# Patient Record
Sex: FEMALE | Race: WHITE | NOT HISPANIC OR LATINO | Employment: UNEMPLOYED | URBAN - METROPOLITAN AREA
[De-identification: names, ages, dates, MRNs, and addresses within clinical notes are randomized per-mention and may not be internally consistent; named-entity substitution may affect disease eponyms.]

---

## 2017-01-01 ENCOUNTER — TELEPHONE (OUTPATIENT)
Dept: TRANSPLANT | Facility: CLINIC | Age: 57
End: 2017-01-01

## 2017-05-08 ENCOUNTER — HOSPITAL ENCOUNTER (OUTPATIENT)
Dept: RADIOLOGY | Facility: HOSPITAL | Age: 57
Discharge: HOME | End: 2017-05-08
Attending: INTERNAL MEDICINE
Payer: OTHER GOVERNMENT

## 2017-05-08 DIAGNOSIS — J20.9 ACUTE BRONCHITIS: ICD-10-CM

## 2017-05-08 DIAGNOSIS — J20.9 ACUTE BRONCHITIS: Primary | ICD-10-CM

## 2017-05-08 PROCEDURE — 71020 XR CHEST PA AND LATERAL: CPT | Mod: TC

## 2017-05-08 PROCEDURE — 71020 XR CHEST PA AND LATERAL: CPT | Mod: 26,,, | Performed by: RADIOLOGY

## 2017-05-11 ENCOUNTER — HOSPITAL ENCOUNTER (OUTPATIENT)
Dept: RADIOLOGY | Facility: HOSPITAL | Age: 57
Discharge: HOME | End: 2017-05-11
Attending: INTERNAL MEDICINE
Payer: OTHER GOVERNMENT

## 2017-05-11 DIAGNOSIS — J44.1 OBSTRUCTIVE CHRONIC BRONCHITIS WITH EXACERBATION: ICD-10-CM

## 2017-05-11 PROCEDURE — 71020 XR CHEST PA AND LATERAL: CPT | Mod: 26,,, | Performed by: RADIOLOGY

## 2017-05-11 PROCEDURE — 71020 XR CHEST PA AND LATERAL: CPT | Mod: TC

## 2017-08-14 NOTE — TELEPHONE ENCOUNTER
Spoke w/patient, who reports her issues are more pulmonary oriented, and she also has hypertension, but not specifically PH. Pt provided with number for Pulmonary services to schedule appt.

## 2017-08-14 NOTE — TELEPHONE ENCOUNTER
----- Message from America Priest MA sent at 8/14/2017 11:03 AM CDT -----  Contact: pt   She will be a new patient . Thanks   ----- Message -----  From: Radha Connor  Sent: 8/14/2017  10:36 AM  To: [facility] Schedulers    Pt called because she wanted to schedule an appt w/  Dr. Roca.  She states she is having pulmonary and cardiac issues and wants to see Dr. Roca.  She can be reached @ [phone].  Thanks!!

## 2017-08-23 PROBLEM — B44.9 ASPERGILLOSIS: Status: ACTIVE | Noted: 2017-01-01

## 2017-08-23 PROBLEM — B44.81 ABPA (ALLERGIC BRONCHOPULMONARY ASPERGILLOSIS): Status: ACTIVE | Noted: 2017-01-01

## 2017-08-23 PROBLEM — K21.9 GASTROESOPHAGEAL REFLUX DISEASE WITHOUT ESOPHAGITIS: Status: ACTIVE | Noted: 2017-01-01

## 2017-08-25 PROBLEM — B44.9 ASPERGILLOSIS: Status: ACTIVE | Noted: 2017-01-01

## 2017-09-12 PROBLEM — J18.9 PNEUMONIA OF LEFT LOWER LOBE DUE TO INFECTIOUS ORGANISM: Status: ACTIVE | Noted: 2017-01-01

## 2017-09-12 PROBLEM — K21.9 GERD (GASTROESOPHAGEAL REFLUX DISEASE): Status: ACTIVE | Noted: 2017-01-01

## 2017-09-13 PROBLEM — R06.09 DYSPNEA ON EXERTION: Status: ACTIVE | Noted: 2017-01-01

## 2017-09-13 PROBLEM — R50.9 FEBRILE ILLNESS: Status: ACTIVE | Noted: 2017-01-01

## 2017-09-13 PROBLEM — R93.89 ABNORMAL X-RAY: Status: ACTIVE | Noted: 2017-01-01

## 2017-09-13 PROBLEM — E66.9 OBESITY: Status: ACTIVE | Noted: 2017-01-01

## 2017-10-04 PROBLEM — J18.9 PNEUMONIA OF LEFT LOWER LOBE DUE TO INFECTIOUS ORGANISM: Status: RESOLVED | Noted: 2017-01-01 | Resolved: 2017-01-01

## 2017-10-17 NOTE — TELEPHONE ENCOUNTER
On 10/5/17 I received a message that pt was calling to arrange an appt with Dr. Roca. For eval of her ABPD, which she says her current cardiologist  Says can cause her LV failure. In reviewing epic records, I found no record of and echo. Per Dr Roca we need record of an echo indicating pt has PH or HF.   10/6/17 Per my request my coworker Jenelle Weber RN informed pt of this. Per Scar conversation with pt, pt is to send us copy of her echo. As of today, we have not received echo.

## 2017-12-19 PROBLEM — E78.2 MIXED HYPERLIPIDEMIA: Status: ACTIVE | Noted: 2017-01-01

## 2017-12-19 PROBLEM — J47.9 BRONCHIECTASIS WITHOUT COMPLICATION: Status: ACTIVE | Noted: 2017-01-01

## 2017-12-19 PROBLEM — J32.9 CHRONIC SINUSITIS: Status: ACTIVE | Noted: 2017-01-01

## 2017-12-19 PROBLEM — I10 ESSENTIAL HYPERTENSION: Status: ACTIVE | Noted: 2017-01-01

## 2017-12-19 PROBLEM — E55.9 VITAMIN D DEFICIENCY: Status: ACTIVE | Noted: 2017-01-01

## 2017-12-19 PROBLEM — A60.04: Status: ACTIVE | Noted: 2017-01-01

## 2017-12-19 PROBLEM — R73.02 IMPAIRED GLUCOSE TOLERANCE: Status: ACTIVE | Noted: 2017-01-01

## 2018-01-01 ENCOUNTER — ANESTHESIA EVENT (OUTPATIENT)
Dept: INTENSIVE CARE | Facility: HOSPITAL | Age: 58
DRG: 166 | End: 2018-01-01
Payer: OTHER GOVERNMENT

## 2018-01-01 ENCOUNTER — HOSPITAL ENCOUNTER (INPATIENT)
Facility: HOSPITAL | Age: 58
LOS: 17 days | DRG: 166 | End: 2018-06-11
Attending: INTERNAL MEDICINE | Admitting: INTERNAL MEDICINE
Payer: OTHER GOVERNMENT

## 2018-01-01 VITALS
TEMPERATURE: 98 F | OXYGEN SATURATION: 77 % | WEIGHT: 179.44 LBS | RESPIRATION RATE: 28 BRPM | DIASTOLIC BLOOD PRESSURE: 76 MMHG | BODY MASS INDEX: 33.02 KG/M2 | HEIGHT: 62 IN | SYSTOLIC BLOOD PRESSURE: 107 MMHG

## 2018-01-01 DIAGNOSIS — B44.81 ABPA (ALLERGIC BRONCHOPULMONARY ASPERGILLOSIS): ICD-10-CM

## 2018-01-01 DIAGNOSIS — J98.11 ATELECTASIS: ICD-10-CM

## 2018-01-01 DIAGNOSIS — R00.0 TACHYCARDIA: ICD-10-CM

## 2018-01-01 DIAGNOSIS — I10 ESSENTIAL HYPERTENSION: ICD-10-CM

## 2018-01-01 DIAGNOSIS — J96.90 RESPIRATORY FAILURE: ICD-10-CM

## 2018-01-01 DIAGNOSIS — T17.500A MUCUS PLUGGING OF BRONCHI: ICD-10-CM

## 2018-01-01 DIAGNOSIS — J96.01 ACUTE HYPOXEMIC RESPIRATORY FAILURE: Primary | ICD-10-CM

## 2018-01-01 DIAGNOSIS — J81.1 PULMONARY EDEMA: ICD-10-CM

## 2018-01-01 LAB
1,3 BETA GLUCAN SPEC-MCNC: <31 PG/ML
ABO + RH BLD: NORMAL
ALBUMIN SERPL BCP-MCNC: 1.2 G/DL
ALBUMIN SERPL BCP-MCNC: 1.5 G/DL
ALBUMIN SERPL BCP-MCNC: 1.7 G/DL
ALBUMIN SERPL BCP-MCNC: 1.8 G/DL
ALBUMIN SERPL BCP-MCNC: 1.9 G/DL
ALBUMIN SERPL BCP-MCNC: 2 G/DL
ALBUMIN SERPL BCP-MCNC: 2.1 G/DL
ALBUMIN SERPL BCP-MCNC: 2.2 G/DL
ALBUMIN SERPL BCP-MCNC: 2.3 G/DL
ALLENS TEST: ABNORMAL
ALP SERPL-CCNC: 161 U/L
ALP SERPL-CCNC: 199 U/L
ALP SERPL-CCNC: 201 U/L
ALP SERPL-CCNC: 217 U/L
ALP SERPL-CCNC: 220 U/L
ALP SERPL-CCNC: 236 U/L
ALP SERPL-CCNC: 241 U/L
ALP SERPL-CCNC: 256 U/L
ALP SERPL-CCNC: 262 U/L
ALP SERPL-CCNC: 266 U/L
ALP SERPL-CCNC: 269 U/L
ALP SERPL-CCNC: 272 U/L
ALP SERPL-CCNC: 295 U/L
ALP SERPL-CCNC: 304 U/L
ALP SERPL-CCNC: 393 U/L
ALP SERPL-CCNC: 432 U/L
ALP SERPL-CCNC: 500 U/L
ALP SERPL-CCNC: 515 U/L
ALT SERPL W/O P-5'-P-CCNC: 103 U/L
ALT SERPL W/O P-5'-P-CCNC: 107 U/L
ALT SERPL W/O P-5'-P-CCNC: 110 U/L
ALT SERPL W/O P-5'-P-CCNC: 123 U/L
ALT SERPL W/O P-5'-P-CCNC: 126 U/L
ALT SERPL W/O P-5'-P-CCNC: 142 U/L
ALT SERPL W/O P-5'-P-CCNC: 167 U/L
ALT SERPL W/O P-5'-P-CCNC: 173 U/L
ALT SERPL W/O P-5'-P-CCNC: 179 U/L
ALT SERPL W/O P-5'-P-CCNC: 187 U/L
ALT SERPL W/O P-5'-P-CCNC: 205 U/L
ALT SERPL W/O P-5'-P-CCNC: 386 U/L
ALT SERPL W/O P-5'-P-CCNC: 583 U/L
ALT SERPL W/O P-5'-P-CCNC: 67 U/L
ALT SERPL W/O P-5'-P-CCNC: 758 U/L
ALT SERPL W/O P-5'-P-CCNC: 829 U/L
ALT SERPL W/O P-5'-P-CCNC: 83 U/L
ALT SERPL W/O P-5'-P-CCNC: 92 U/L
AMMONIA PLAS-SCNC: 45 UMOL/L
AMYLASE, BODY FLUID: 14 U/L
ANION GAP SERPL CALC-SCNC: 10 MMOL/L
ANION GAP SERPL CALC-SCNC: 11 MMOL/L
ANION GAP SERPL CALC-SCNC: 12 MMOL/L
ANION GAP SERPL CALC-SCNC: 13 MMOL/L
ANION GAP SERPL CALC-SCNC: 14 MMOL/L
ANION GAP SERPL CALC-SCNC: 6 MMOL/L
ANION GAP SERPL CALC-SCNC: 8 MMOL/L
ANION GAP SERPL CALC-SCNC: 8 MMOL/L
ANION GAP SERPL CALC-SCNC: 9 MMOL/L
ANISOCYTOSIS BLD QL SMEAR: ABNORMAL
ANISOCYTOSIS BLD QL SMEAR: SLIGHT
APPEARANCE FLD: NORMAL
APPEARANCE FLD: NORMAL
AST SERPL-CCNC: 118 U/L
AST SERPL-CCNC: 139 U/L
AST SERPL-CCNC: 143 U/L
AST SERPL-CCNC: 154 U/L
AST SERPL-CCNC: 197 U/L
AST SERPL-CCNC: 355 U/L
AST SERPL-CCNC: 36 U/L
AST SERPL-CCNC: 48 U/L
AST SERPL-CCNC: 488 U/L
AST SERPL-CCNC: 51 U/L
AST SERPL-CCNC: 53 U/L
AST SERPL-CCNC: 55 U/L
AST SERPL-CCNC: 56 U/L
AST SERPL-CCNC: 57 U/L
AST SERPL-CCNC: 64 U/L
AST SERPL-CCNC: 75 U/L
AST SERPL-CCNC: 76 U/L
AST SERPL-CCNC: 79 U/L
B-HCG UR QL: NEGATIVE
BACTERIA #/AREA URNS AUTO: ABNORMAL /HPF
BACTERIA BLD CULT: NORMAL
BACTERIA BLD CULT: NORMAL
BACTERIA FLD AEROBE CULT: NO GROWTH
BACTERIA SPEC AEROBE CULT: NORMAL
BACTERIA SPEC AEROBE CULT: NORMAL
BASO STIPL BLD QL SMEAR: ABNORMAL
BASOPHILS # BLD AUTO: 0 K/UL
BASOPHILS # BLD AUTO: 0.01 K/UL
BASOPHILS # BLD AUTO: 0.02 K/UL
BASOPHILS # BLD AUTO: 0.03 K/UL
BASOPHILS # BLD AUTO: 0.03 K/UL
BASOPHILS # BLD AUTO: 0.05 K/UL
BASOPHILS # BLD AUTO: ABNORMAL K/UL
BASOPHILS # BLD AUTO: ABNORMAL K/UL
BASOPHILS NFR BLD: 0 %
BASOPHILS NFR BLD: 0.1 %
BASOPHILS NFR BLD: 0.2 %
BASOPHILS NFR BLD: 0.3 %
BASOPHILS NFR BLD: 0.4 %
BASOPHILS NFR BLD: 0.8 %
BASOPHILS NFR BLD: 1.4 %
BASOPHILS NFR BLD: 1.9 %
BASOPHILS NFR BLD: 2 %
BILIRUB SERPL-MCNC: 0.4 MG/DL
BILIRUB SERPL-MCNC: 0.5 MG/DL
BILIRUB SERPL-MCNC: 0.6 MG/DL
BILIRUB SERPL-MCNC: 0.7 MG/DL
BILIRUB SERPL-MCNC: 0.7 MG/DL
BILIRUB SERPL-MCNC: 0.8 MG/DL
BILIRUB SERPL-MCNC: 1.3 MG/DL
BILIRUB UR QL STRIP: NEGATIVE
BLD GP AB SCN CELLS X3 SERPL QL: NORMAL
BLD PROD TYP BPU: NORMAL
BLOOD UNIT EXPIRATION DATE: NORMAL
BLOOD UNIT TYPE CODE: 9500
BLOOD UNIT TYPE: NORMAL
BNP SERPL-MCNC: 163 PG/ML
BNP SERPL-MCNC: 207 PG/ML
BODY FLD TYPE: NORMAL
BODY FLD TYPE: NORMAL
BODY FLUID SOURCE AMYLASE: NORMAL
BODY FLUID SOURCE, LDH: NORMAL
BUN SERPL-MCNC: 11 MG/DL
BUN SERPL-MCNC: 12 MG/DL
BUN SERPL-MCNC: 12 MG/DL
BUN SERPL-MCNC: 13 MG/DL
BUN SERPL-MCNC: 14 MG/DL
BUN SERPL-MCNC: 14 MG/DL
BUN SERPL-MCNC: 16 MG/DL
BUN SERPL-MCNC: 17 MG/DL
BUN SERPL-MCNC: 17 MG/DL
BUN SERPL-MCNC: 18 MG/DL
BUN SERPL-MCNC: 19 MG/DL
BUN SERPL-MCNC: 20 MG/DL
BUN SERPL-MCNC: 20 MG/DL
BUN SERPL-MCNC: 21 MG/DL
BUN SERPL-MCNC: 21 MG/DL
BUN SERPL-MCNC: 22 MG/DL
BUN SERPL-MCNC: 23 MG/DL
BUN SERPL-MCNC: 24 MG/DL
BUN SERPL-MCNC: 24 MG/DL
BUN SERPL-MCNC: 27 MG/DL
BURR CELLS BLD QL SMEAR: ABNORMAL
CALCIUM SERPL-MCNC: 6.1 MG/DL
CALCIUM SERPL-MCNC: 7.1 MG/DL
CALCIUM SERPL-MCNC: 7.2 MG/DL
CALCIUM SERPL-MCNC: 7.3 MG/DL
CALCIUM SERPL-MCNC: 7.4 MG/DL
CALCIUM SERPL-MCNC: 7.5 MG/DL
CALCIUM SERPL-MCNC: 7.6 MG/DL
CALCIUM SERPL-MCNC: 7.6 MG/DL
CALCIUM SERPL-MCNC: 7.7 MG/DL
CALCIUM SERPL-MCNC: 7.8 MG/DL
CALCIUM SERPL-MCNC: 7.8 MG/DL
CALCIUM SERPL-MCNC: 7.9 MG/DL
CALCIUM SERPL-MCNC: 8 MG/DL
CALCIUM SERPL-MCNC: 8 MG/DL
CHLORIDE SERPL-SCNC: 100 MMOL/L
CHLORIDE SERPL-SCNC: 102 MMOL/L
CHLORIDE SERPL-SCNC: 102 MMOL/L
CHLORIDE SERPL-SCNC: 103 MMOL/L
CHLORIDE SERPL-SCNC: 104 MMOL/L
CHLORIDE SERPL-SCNC: 104 MMOL/L
CHLORIDE SERPL-SCNC: 105 MMOL/L
CHLORIDE SERPL-SCNC: 105 MMOL/L
CHLORIDE SERPL-SCNC: 106 MMOL/L
CHLORIDE SERPL-SCNC: 106 MMOL/L
CHLORIDE SERPL-SCNC: 107 MMOL/L
CHLORIDE SERPL-SCNC: 91 MMOL/L
CHLORIDE SERPL-SCNC: 91 MMOL/L
CHLORIDE SERPL-SCNC: 92 MMOL/L
CHLORIDE SERPL-SCNC: 92 MMOL/L
CHLORIDE SERPL-SCNC: 93 MMOL/L
CHLORIDE SERPL-SCNC: 94 MMOL/L
CHLORIDE SERPL-SCNC: 96 MMOL/L
CHLORIDE SERPL-SCNC: 97 MMOL/L
CLARITY UR REFRACT.AUTO: ABNORMAL
CO2 SERPL-SCNC: 20 MMOL/L
CO2 SERPL-SCNC: 23 MMOL/L
CO2 SERPL-SCNC: 25 MMOL/L
CO2 SERPL-SCNC: 26 MMOL/L
CO2 SERPL-SCNC: 27 MMOL/L
CO2 SERPL-SCNC: 28 MMOL/L
CO2 SERPL-SCNC: 30 MMOL/L
CO2 SERPL-SCNC: 30 MMOL/L
CO2 SERPL-SCNC: 32 MMOL/L
CO2 SERPL-SCNC: 32 MMOL/L
CO2 SERPL-SCNC: 35 MMOL/L
CO2 SERPL-SCNC: 35 MMOL/L
CO2 SERPL-SCNC: 36 MMOL/L
CO2 SERPL-SCNC: 37 MMOL/L
CO2 SERPL-SCNC: 39 MMOL/L
CO2 SERPL-SCNC: 39 MMOL/L
CO2 SERPL-SCNC: 40 MMOL/L
CO2 SERPL-SCNC: 40 MMOL/L
CODING SYSTEM: NORMAL
COLOR FLD: COLORLESS
COLOR FLD: YELLOW
COLOR UR AUTO: ABNORMAL
CREAT SERPL-MCNC: 0.4 MG/DL
CREAT SERPL-MCNC: 0.5 MG/DL
CREAT SERPL-MCNC: 0.6 MG/DL
CREAT SERPL-MCNC: 0.7 MG/DL
CREAT SERPL-MCNC: 0.8 MG/DL
CREAT SERPL-MCNC: 0.9 MG/DL
CREAT SERPL-MCNC: 0.9 MG/DL
CREAT SERPL-MCNC: 1 MG/DL
DACRYOCYTES BLD QL SMEAR: ABNORMAL
DELSYS: ABNORMAL
DIASTOLIC DYSFUNCTION: NO
DIFFERENTIAL METHOD: ABNORMAL
DISPENSE STATUS: NORMAL
EOSINOPHIL # BLD AUTO: 0 K/UL
EOSINOPHIL # BLD AUTO: ABNORMAL K/UL
EOSINOPHIL # BLD AUTO: ABNORMAL K/UL
EOSINOPHIL NFR BLD: 0 %
EP: 5
ERYTHROCYTE [DISTWIDTH] IN BLOOD BY AUTOMATED COUNT: 16.8 %
ERYTHROCYTE [DISTWIDTH] IN BLOOD BY AUTOMATED COUNT: 16.8 %
ERYTHROCYTE [DISTWIDTH] IN BLOOD BY AUTOMATED COUNT: 16.9 %
ERYTHROCYTE [DISTWIDTH] IN BLOOD BY AUTOMATED COUNT: 17 %
ERYTHROCYTE [DISTWIDTH] IN BLOOD BY AUTOMATED COUNT: 17 %
ERYTHROCYTE [DISTWIDTH] IN BLOOD BY AUTOMATED COUNT: 17.1 %
ERYTHROCYTE [DISTWIDTH] IN BLOOD BY AUTOMATED COUNT: 17.1 %
ERYTHROCYTE [DISTWIDTH] IN BLOOD BY AUTOMATED COUNT: 18.6 %
ERYTHROCYTE [DISTWIDTH] IN BLOOD BY AUTOMATED COUNT: 19 %
ERYTHROCYTE [DISTWIDTH] IN BLOOD BY AUTOMATED COUNT: 19.1 %
ERYTHROCYTE [DISTWIDTH] IN BLOOD BY AUTOMATED COUNT: 19.1 %
ERYTHROCYTE [DISTWIDTH] IN BLOOD BY AUTOMATED COUNT: 19.2 %
ERYTHROCYTE [DISTWIDTH] IN BLOOD BY AUTOMATED COUNT: 19.3 %
ERYTHROCYTE [DISTWIDTH] IN BLOOD BY AUTOMATED COUNT: 19.4 %
ERYTHROCYTE [DISTWIDTH] IN BLOOD BY AUTOMATED COUNT: 19.5 %
ERYTHROCYTE [DISTWIDTH] IN BLOOD BY AUTOMATED COUNT: 19.6 %
ERYTHROCYTE [DISTWIDTH] IN BLOOD BY AUTOMATED COUNT: 19.8 %
ERYTHROCYTE [DISTWIDTH] IN BLOOD BY AUTOMATED COUNT: 19.9 %
ERYTHROCYTE [DISTWIDTH] IN BLOOD BY AUTOMATED COUNT: 19.9 %
ERYTHROCYTE [DISTWIDTH] IN BLOOD BY AUTOMATED COUNT: 20 %
ERYTHROCYTE [SEDIMENTATION RATE] IN BLOOD BY WESTERGREN METHOD: 14 MM/H
ERYTHROCYTE [SEDIMENTATION RATE] IN BLOOD BY WESTERGREN METHOD: 16 MM/H
ERYTHROCYTE [SEDIMENTATION RATE] IN BLOOD BY WESTERGREN METHOD: 20 MM/H
ERYTHROCYTE [SEDIMENTATION RATE] IN BLOOD BY WESTERGREN METHOD: 20 MM/H
EST. GFR  (AFRICAN AMERICAN): >60 ML/MIN/1.73 M^2
EST. GFR  (NON AFRICAN AMERICAN): >60 ML/MIN/1.73 M^2
ESTIMATED AVG GLUCOSE: 103 MG/DL
ESTIMATED PA SYSTOLIC PRESSURE: 21.34
FACT X PPP CHRO-ACNC: 0.13 IU/ML
FACT X PPP CHRO-ACNC: 0.2 IU/ML
FACT X PPP CHRO-ACNC: 0.38 IU/ML
FACT X PPP CHRO-ACNC: 0.44 IU/ML
FACT X PPP CHRO-ACNC: 0.88 IU/ML
FACT X PPP CHRO-ACNC: 0.89 IU/ML
FACT X PPP CHRO-ACNC: 0.97 IU/ML
FACT X PPP CHRO-ACNC: 0.97 IU/ML
FACT X PPP CHRO-ACNC: 1.03 IU/ML
FACT X PPP CHRO-ACNC: 1.04 IU/ML
FACT X PPP CHRO-ACNC: 1.06 IU/ML
FACT X PPP CHRO-ACNC: 1.35 IU/ML
FACT X PPP CHRO-ACNC: 1.54 IU/ML
FACT X PPP CHRO-ACNC: >1.83 IU/ML
FIO2: 100
FIO2: 100
FIO2: 40
FIO2: 50
FIO2: 70
FLOW: 35
FLOW: 40
G6PD RBC-CCNT: 22.9 U/G HGB (ref 7–20.5)
GALACTOMANNAN AG SERPL IA-ACNC: <0.5 INDEX
GALACTOMANNAN AG SPEC-ACNC: <0.5 INDEX
GIANT PLATELETS BLD QL SMEAR: PRESENT
GLUCOSE FLD-MCNC: 169 MG/DL
GLUCOSE SERPL-MCNC: 103 MG/DL
GLUCOSE SERPL-MCNC: 107 MG/DL
GLUCOSE SERPL-MCNC: 108 MG/DL
GLUCOSE SERPL-MCNC: 114 MG/DL
GLUCOSE SERPL-MCNC: 118 MG/DL
GLUCOSE SERPL-MCNC: 120 MG/DL
GLUCOSE SERPL-MCNC: 121 MG/DL
GLUCOSE SERPL-MCNC: 122 MG/DL
GLUCOSE SERPL-MCNC: 124 MG/DL
GLUCOSE SERPL-MCNC: 126 MG/DL
GLUCOSE SERPL-MCNC: 136 MG/DL
GLUCOSE SERPL-MCNC: 140 MG/DL
GLUCOSE SERPL-MCNC: 141 MG/DL
GLUCOSE SERPL-MCNC: 142 MG/DL
GLUCOSE SERPL-MCNC: 153 MG/DL
GLUCOSE SERPL-MCNC: 171 MG/DL
GLUCOSE SERPL-MCNC: 171 MG/DL
GLUCOSE SERPL-MCNC: 176 MG/DL
GLUCOSE SERPL-MCNC: 190 MG/DL
GLUCOSE SERPL-MCNC: 194 MG/DL
GLUCOSE SERPL-MCNC: 214 MG/DL
GLUCOSE SERPL-MCNC: 218 MG/DL
GLUCOSE SERPL-MCNC: 97 MG/DL
GLUCOSE SERPL-MCNC: 99 MG/DL
GLUCOSE UR QL STRIP: NEGATIVE
GRAM STN SPEC: NORMAL
HAPTOGLOB SERPL-MCNC: 273 MG/DL
HBA1C MFR BLD HPLC: 5.2 %
HCO3 UR-SCNC: 27.2 MMOL/L (ref 24–28)
HCO3 UR-SCNC: 28.3 MMOL/L (ref 24–28)
HCO3 UR-SCNC: 28.8 MMOL/L (ref 24–28)
HCO3 UR-SCNC: 29.1 MMOL/L (ref 24–28)
HCO3 UR-SCNC: 29.1 MMOL/L (ref 24–28)
HCO3 UR-SCNC: 29.9 MMOL/L (ref 24–28)
HCO3 UR-SCNC: 30.5 MMOL/L (ref 24–28)
HCO3 UR-SCNC: 31.6 MMOL/L (ref 24–28)
HCO3 UR-SCNC: 31.8 MMOL/L (ref 24–28)
HCO3 UR-SCNC: 37 MMOL/L (ref 24–28)
HCO3 UR-SCNC: 39.7 MMOL/L (ref 24–28)
HCO3 UR-SCNC: 39.7 MMOL/L (ref 24–28)
HCO3 UR-SCNC: 40.6 MMOL/L (ref 24–28)
HCT VFR BLD AUTO: 17.7 %
HCT VFR BLD AUTO: 18.1 %
HCT VFR BLD AUTO: 20 %
HCT VFR BLD AUTO: 21 %
HCT VFR BLD AUTO: 23.6 %
HCT VFR BLD AUTO: 23.8 %
HCT VFR BLD AUTO: 23.8 %
HCT VFR BLD AUTO: 24 %
HCT VFR BLD AUTO: 24.2 %
HCT VFR BLD AUTO: 24.3 %
HCT VFR BLD AUTO: 24.5 %
HCT VFR BLD AUTO: 24.6 %
HCT VFR BLD AUTO: 24.7 %
HCT VFR BLD AUTO: 25.5 %
HCT VFR BLD AUTO: 25.5 %
HCT VFR BLD AUTO: 25.6 %
HCT VFR BLD AUTO: 25.6 %
HCT VFR BLD AUTO: 25.7 %
HCT VFR BLD AUTO: 25.9 %
HCT VFR BLD AUTO: 27 %
HCT VFR BLD AUTO: 27 %
HCT VFR BLD AUTO: 27.2 %
HCT VFR BLD AUTO: 27.3 %
HCT VFR BLD AUTO: 27.5 %
HCT VFR BLD AUTO: 27.7 %
HCT VFR BLD AUTO: 28.2 %
HCT VFR BLD AUTO: 30.4 %
HCT VFR BLD AUTO: 30.8 %
HCT VFR BLD AUTO: 33.3 %
HEPARIN INDUCED THROMBOCYTOPENIA: NORMAL
HGB BLD-MCNC: 10.3 G/DL
HGB BLD-MCNC: 11 G/DL
HGB BLD-MCNC: 5.7 G/DL
HGB BLD-MCNC: 5.8 G/DL
HGB BLD-MCNC: 6.4 G/DL
HGB BLD-MCNC: 6.8 G/DL
HGB BLD-MCNC: 7.4 G/DL
HGB BLD-MCNC: 7.5 G/DL
HGB BLD-MCNC: 7.5 G/DL
HGB BLD-MCNC: 7.6 G/DL
HGB BLD-MCNC: 7.7 G/DL
HGB BLD-MCNC: 7.8 G/DL
HGB BLD-MCNC: 7.8 G/DL
HGB BLD-MCNC: 7.9 G/DL
HGB BLD-MCNC: 8 G/DL
HGB BLD-MCNC: 8.1 G/DL
HGB BLD-MCNC: 8.3 G/DL
HGB BLD-MCNC: 8.5 G/DL
HGB BLD-MCNC: 8.6 G/DL
HGB BLD-MCNC: 8.8 G/DL
HGB BLD-MCNC: 8.9 G/DL
HGB BLD-MCNC: 9.8 G/DL
HGB UR QL STRIP: NEGATIVE
HYALINE CASTS UR QL AUTO: 65 /LPF
HYPOCHROMIA BLD QL SMEAR: ABNORMAL
IMM GRANULOCYTES # BLD AUTO: 0 K/UL
IMM GRANULOCYTES # BLD AUTO: 0.01 K/UL
IMM GRANULOCYTES # BLD AUTO: 0.02 K/UL
IMM GRANULOCYTES # BLD AUTO: 0.02 K/UL
IMM GRANULOCYTES # BLD AUTO: 0.03 K/UL
IMM GRANULOCYTES # BLD AUTO: 0.04 K/UL
IMM GRANULOCYTES # BLD AUTO: 0.04 K/UL
IMM GRANULOCYTES # BLD AUTO: 0.05 K/UL
IMM GRANULOCYTES # BLD AUTO: 0.05 K/UL
IMM GRANULOCYTES # BLD AUTO: 0.07 K/UL
IMM GRANULOCYTES # BLD AUTO: 0.08 K/UL
IMM GRANULOCYTES # BLD AUTO: 0.11 K/UL
IMM GRANULOCYTES # BLD AUTO: 0.12 K/UL
IMM GRANULOCYTES # BLD AUTO: 0.13 K/UL
IMM GRANULOCYTES # BLD AUTO: 0.22 K/UL
IMM GRANULOCYTES # BLD AUTO: 0.23 K/UL
IMM GRANULOCYTES # BLD AUTO: 0.32 K/UL
IMM GRANULOCYTES # BLD AUTO: 0.51 K/UL
IMM GRANULOCYTES # BLD AUTO: ABNORMAL K/UL
IMM GRANULOCYTES # BLD AUTO: ABNORMAL K/UL
IMM GRANULOCYTES NFR BLD AUTO: 0 %
IMM GRANULOCYTES NFR BLD AUTO: 0.4 %
IMM GRANULOCYTES NFR BLD AUTO: 0.6 %
IMM GRANULOCYTES NFR BLD AUTO: 0.7 %
IMM GRANULOCYTES NFR BLD AUTO: 0.8 %
IMM GRANULOCYTES NFR BLD AUTO: 0.8 %
IMM GRANULOCYTES NFR BLD AUTO: 0.9 %
IMM GRANULOCYTES NFR BLD AUTO: 1 %
IMM GRANULOCYTES NFR BLD AUTO: 1.1 %
IMM GRANULOCYTES NFR BLD AUTO: 1.1 %
IMM GRANULOCYTES NFR BLD AUTO: 1.2 %
IMM GRANULOCYTES NFR BLD AUTO: 1.3 %
IMM GRANULOCYTES NFR BLD AUTO: 1.3 %
IMM GRANULOCYTES NFR BLD AUTO: 1.6 %
IMM GRANULOCYTES NFR BLD AUTO: 1.8 %
IMM GRANULOCYTES NFR BLD AUTO: 1.8 %
IMM GRANULOCYTES NFR BLD AUTO: 1.9 %
IMM GRANULOCYTES NFR BLD AUTO: 2 %
IMM GRANULOCYTES NFR BLD AUTO: 3.1 %
IMM GRANULOCYTES NFR BLD AUTO: 3.7 %
IMM GRANULOCYTES NFR BLD AUTO: ABNORMAL %
IMM GRANULOCYTES NFR BLD AUTO: ABNORMAL %
INR PPP: 0.9
IP: 10
IP: 10
IP: 12
IP: 25
IP: 25
IT: 0.7
IT: 0.75
KETONES UR QL STRIP: NEGATIVE
KOH PREP SPEC: NORMAL
KOH PREP SPEC: NORMAL
LACTATE SERPL-SCNC: 1.2 MMOL/L
LACTATE SERPL-SCNC: 4.1 MMOL/L
LDH FLD L TO P-CCNC: 273 U/L
LDH SERPL L TO P-CCNC: 751 U/L
LDH SERPL L TO P-CCNC: 760 U/L
LEUKOCYTE ESTERASE UR QL STRIP: ABNORMAL
LYMPHOCYTES # BLD AUTO: 0 K/UL
LYMPHOCYTES # BLD AUTO: 0.1 K/UL
LYMPHOCYTES # BLD AUTO: 0.2 K/UL
LYMPHOCYTES # BLD AUTO: 0.2 K/UL
LYMPHOCYTES # BLD AUTO: ABNORMAL K/UL
LYMPHOCYTES # BLD AUTO: ABNORMAL K/UL
LYMPHOCYTES NFR BLD: 0.1 %
LYMPHOCYTES NFR BLD: 0.3 %
LYMPHOCYTES NFR BLD: 0.3 %
LYMPHOCYTES NFR BLD: 0.6 %
LYMPHOCYTES NFR BLD: 0.6 %
LYMPHOCYTES NFR BLD: 0.7 %
LYMPHOCYTES NFR BLD: 0.7 %
LYMPHOCYTES NFR BLD: 0.8 %
LYMPHOCYTES NFR BLD: 0.9 %
LYMPHOCYTES NFR BLD: 0.9 %
LYMPHOCYTES NFR BLD: 1.1 %
LYMPHOCYTES NFR BLD: 1.2 %
LYMPHOCYTES NFR BLD: 1.3 %
LYMPHOCYTES NFR BLD: 1.4 %
LYMPHOCYTES NFR BLD: 1.7 %
LYMPHOCYTES NFR BLD: 2.1 %
LYMPHOCYTES NFR BLD: 2.1 %
LYMPHOCYTES NFR BLD: 2.4 %
LYMPHOCYTES NFR BLD: 2.7 %
LYMPHOCYTES NFR BLD: 3 %
LYMPHOCYTES NFR BLD: 3 %
LYMPHOCYTES NFR BLD: 3.2 %
LYMPHOCYTES NFR BLD: 4.1 %
LYMPHOCYTES NFR BLD: 4.3 %
LYMPHOCYTES NFR BLD: 4.7 %
LYMPHOCYTES NFR BLD: 5 %
LYMPHOCYTES NFR BLD: 5.7 %
LYMPHOCYTES NFR BLD: 6.2 %
LYMPHOCYTES NFR BLD: 9.1 %
LYMPHOCYTES NFR FLD MANUAL: 5 %
LYMPHOCYTES NFR FLD MANUAL: 88 %
MAGNESIUM SERPL-MCNC: 1.5 MG/DL
MAGNESIUM SERPL-MCNC: 1.6 MG/DL
MAGNESIUM SERPL-MCNC: 1.7 MG/DL
MAGNESIUM SERPL-MCNC: 1.8 MG/DL
MAGNESIUM SERPL-MCNC: 1.8 MG/DL
MAGNESIUM SERPL-MCNC: 1.9 MG/DL
MAGNESIUM SERPL-MCNC: 1.9 MG/DL
MAGNESIUM SERPL-MCNC: 2 MG/DL
MAGNESIUM SERPL-MCNC: 2.1 MG/DL
MAGNESIUM SERPL-MCNC: 2.2 MG/DL
MAGNESIUM SERPL-MCNC: 2.2 MG/DL
MAGNESIUM SERPL-MCNC: 2.3 MG/DL
MAGNESIUM SERPL-MCNC: 2.7 MG/DL
MAP: 11
MAP: 14
MCH RBC QN AUTO: 28.5 PG
MCH RBC QN AUTO: 28.6 PG
MCH RBC QN AUTO: 29 PG
MCH RBC QN AUTO: 29.3 PG
MCH RBC QN AUTO: 29.3 PG
MCH RBC QN AUTO: 29.4 PG
MCH RBC QN AUTO: 29.5 PG
MCH RBC QN AUTO: 29.6 PG
MCH RBC QN AUTO: 29.7 PG
MCH RBC QN AUTO: 29.7 PG
MCH RBC QN AUTO: 29.8 PG
MCH RBC QN AUTO: 30 PG
MCH RBC QN AUTO: 30 PG
MCH RBC QN AUTO: 30.1 PG
MCH RBC QN AUTO: 30.2 PG
MCH RBC QN AUTO: 30.2 PG
MCH RBC QN AUTO: 30.4 PG
MCH RBC QN AUTO: 30.5 PG
MCH RBC QN AUTO: 30.6 PG
MCH RBC QN AUTO: 31.4 PG
MCH RBC QN AUTO: 32.6 PG
MCHC RBC AUTO-ENTMCNC: 30.9 G/DL
MCHC RBC AUTO-ENTMCNC: 31.4 G/DL
MCHC RBC AUTO-ENTMCNC: 31.4 G/DL
MCHC RBC AUTO-ENTMCNC: 31.5 G/DL
MCHC RBC AUTO-ENTMCNC: 31.6 G/DL
MCHC RBC AUTO-ENTMCNC: 31.7 G/DL
MCHC RBC AUTO-ENTMCNC: 31.8 G/DL
MCHC RBC AUTO-ENTMCNC: 31.8 G/DL
MCHC RBC AUTO-ENTMCNC: 32 G/DL
MCHC RBC AUTO-ENTMCNC: 32.1 G/DL
MCHC RBC AUTO-ENTMCNC: 32.1 G/DL
MCHC RBC AUTO-ENTMCNC: 32.2 G/DL
MCHC RBC AUTO-ENTMCNC: 32.3 G/DL
MCHC RBC AUTO-ENTMCNC: 32.4 G/DL
MCHC RBC AUTO-ENTMCNC: 32.4 G/DL
MCHC RBC AUTO-ENTMCNC: 32.5 G/DL
MCHC RBC AUTO-ENTMCNC: 32.5 G/DL
MCHC RBC AUTO-ENTMCNC: 32.6 G/DL
MCHC RBC AUTO-ENTMCNC: 32.6 G/DL
MCHC RBC AUTO-ENTMCNC: 32.8 G/DL
MCHC RBC AUTO-ENTMCNC: 32.9 G/DL
MCHC RBC AUTO-ENTMCNC: 33 G/DL
MCHC RBC AUTO-ENTMCNC: 33.4 G/DL
MCHC RBC AUTO-ENTMCNC: 34.8 G/DL
MCV RBC AUTO: 87 FL
MCV RBC AUTO: 87 FL
MCV RBC AUTO: 88 FL
MCV RBC AUTO: 90 FL
MCV RBC AUTO: 91 FL
MCV RBC AUTO: 91 FL
MCV RBC AUTO: 92 FL
MCV RBC AUTO: 92 FL
MCV RBC AUTO: 93 FL
MCV RBC AUTO: 94 FL
MCV RBC AUTO: 95 FL
MCV RBC AUTO: 96 FL
MCV RBC AUTO: 96 FL
MCV RBC AUTO: 97 FL
MESOTHL CELL NFR FLD MANUAL: 2 %
MICROSCOPIC COMMENT: ABNORMAL
MIN VOL: 6.48
MIN VOL: 6.5
MIN VOL: 7.7
MIN VOL: 8.8
MODE: ABNORMAL
MONOCYTES # BLD AUTO: 0.1 K/UL
MONOCYTES # BLD AUTO: 0.2 K/UL
MONOCYTES # BLD AUTO: 0.3 K/UL
MONOCYTES # BLD AUTO: 0.3 K/UL
MONOCYTES # BLD AUTO: ABNORMAL K/UL
MONOCYTES # BLD AUTO: ABNORMAL K/UL
MONOCYTES NFR BLD: 1.4 %
MONOCYTES NFR BLD: 1.6 %
MONOCYTES NFR BLD: 1.6 %
MONOCYTES NFR BLD: 1.7 %
MONOCYTES NFR BLD: 1.7 %
MONOCYTES NFR BLD: 1.8 %
MONOCYTES NFR BLD: 1.9 %
MONOCYTES NFR BLD: 1.9 %
MONOCYTES NFR BLD: 2 %
MONOCYTES NFR BLD: 2.2 %
MONOCYTES NFR BLD: 2.4 %
MONOCYTES NFR BLD: 2.4 %
MONOCYTES NFR BLD: 2.5 %
MONOCYTES NFR BLD: 2.7 %
MONOCYTES NFR BLD: 2.9 %
MONOCYTES NFR BLD: 3 %
MONOCYTES NFR BLD: 3 %
MONOCYTES NFR BLD: 3.6 %
MONOCYTES NFR BLD: 3.8 %
MONOCYTES NFR BLD: 4.7 %
MONOCYTES NFR BLD: 4.8 %
MONOCYTES NFR BLD: 5 %
MONOCYTES NFR BLD: 5.1 %
MONOS+MACROS NFR FLD MANUAL: 3 %
MONOS+MACROS NFR FLD MANUAL: 6 %
MYELOCYTES NFR BLD MANUAL: 1 %
NEUTROPHILS # BLD AUTO: 0.8 K/UL
NEUTROPHILS # BLD AUTO: 1.1 K/UL
NEUTROPHILS # BLD AUTO: 1.3 K/UL
NEUTROPHILS # BLD AUTO: 1.3 K/UL
NEUTROPHILS # BLD AUTO: 1.4 K/UL
NEUTROPHILS # BLD AUTO: 10.3 K/UL
NEUTROPHILS # BLD AUTO: 10.7 K/UL
NEUTROPHILS # BLD AUTO: 10.8 K/UL
NEUTROPHILS # BLD AUTO: 12 K/UL
NEUTROPHILS # BLD AUTO: 12.5 K/UL
NEUTROPHILS # BLD AUTO: 12.5 K/UL
NEUTROPHILS # BLD AUTO: 13 K/UL
NEUTROPHILS # BLD AUTO: 2.1 K/UL
NEUTROPHILS # BLD AUTO: 2.1 K/UL
NEUTROPHILS # BLD AUTO: 2.3 K/UL
NEUTROPHILS # BLD AUTO: 2.6 K/UL
NEUTROPHILS # BLD AUTO: 2.7 K/UL
NEUTROPHILS # BLD AUTO: 3 K/UL
NEUTROPHILS # BLD AUTO: 3.2 K/UL
NEUTROPHILS # BLD AUTO: 3.4 K/UL
NEUTROPHILS # BLD AUTO: 3.5 K/UL
NEUTROPHILS # BLD AUTO: 5.4 K/UL
NEUTROPHILS # BLD AUTO: 6.7 K/UL
NEUTROPHILS # BLD AUTO: 7.1 K/UL
NEUTROPHILS # BLD AUTO: 8 K/UL
NEUTROPHILS # BLD AUTO: 9.6 K/UL
NEUTROPHILS # BLD AUTO: 9.7 K/UL
NEUTROPHILS NFR BLD: 80 %
NEUTROPHILS NFR BLD: 82.8 %
NEUTROPHILS NFR BLD: 86.9 %
NEUTROPHILS NFR BLD: 88 %
NEUTROPHILS NFR BLD: 89 %
NEUTROPHILS NFR BLD: 92.1 %
NEUTROPHILS NFR BLD: 92.5 %
NEUTROPHILS NFR BLD: 92.7 %
NEUTROPHILS NFR BLD: 92.8 %
NEUTROPHILS NFR BLD: 92.9 %
NEUTROPHILS NFR BLD: 93 %
NEUTROPHILS NFR BLD: 93.3 %
NEUTROPHILS NFR BLD: 94 %
NEUTROPHILS NFR BLD: 94 %
NEUTROPHILS NFR BLD: 94.4 %
NEUTROPHILS NFR BLD: 94.4 %
NEUTROPHILS NFR BLD: 94.8 %
NEUTROPHILS NFR BLD: 94.9 %
NEUTROPHILS NFR BLD: 95.1 %
NEUTROPHILS NFR BLD: 95.2 %
NEUTROPHILS NFR BLD: 95.2 %
NEUTROPHILS NFR BLD: 95.5 %
NEUTROPHILS NFR BLD: 95.5 %
NEUTROPHILS NFR BLD: 95.7 %
NEUTROPHILS NFR BLD: 96.4 %
NEUTROPHILS NFR BLD: 96.7 %
NEUTROPHILS NFR BLD: 97 %
NEUTROPHILS NFR BLD: 97 %
NEUTROPHILS NFR BLD: 97.2 %
NEUTROPHILS NFR FLD MANUAL: 4 %
NEUTROPHILS NFR FLD MANUAL: 92 %
NEUTS BAND NFR BLD MANUAL: 9 %
NITRITE UR QL STRIP: NEGATIVE
NRBC BLD-RTO: 0 /100 WBC
NRBC BLD-RTO: 1 /100 WBC
NRBC BLD-RTO: 1 /100 WBC
NRBC BLD-RTO: 10 /100 WBC
NRBC BLD-RTO: 12 /100 WBC
NRBC BLD-RTO: 13 /100 WBC
NRBC BLD-RTO: 16 /100 WBC
NRBC BLD-RTO: 171 /100 WBC
NRBC BLD-RTO: 18 /100 WBC
NRBC BLD-RTO: 18 /100 WBC
NRBC BLD-RTO: 20 /100 WBC
NRBC BLD-RTO: 22 /100 WBC
NRBC BLD-RTO: 22 /100 WBC
NRBC BLD-RTO: 23 /100 WBC
NRBC BLD-RTO: 25 /100 WBC
NRBC BLD-RTO: 28 /100 WBC
NRBC BLD-RTO: 28 /100 WBC
NRBC BLD-RTO: 31 /100 WBC
NRBC BLD-RTO: 34 /100 WBC
NRBC BLD-RTO: 4 /100 WBC
NRBC BLD-RTO: 5 /100 WBC
NRBC BLD-RTO: 5 /100 WBC
NRBC BLD-RTO: 7 /100 WBC
NRBC BLD-RTO: 8 /100 WBC
NRBC BLD-RTO: 8 /100 WBC
OVALOCYTES BLD QL SMEAR: ABNORMAL
PCO2 BLDA: 26.6 MMHG (ref 35–45)
PCO2 BLDA: 31 MMHG (ref 35–45)
PCO2 BLDA: 31.2 MMHG (ref 35–45)
PCO2 BLDA: 32.7 MMHG (ref 35–45)
PCO2 BLDA: 33.8 MMHG (ref 35–45)
PCO2 BLDA: 34.1 MMHG (ref 35–45)
PCO2 BLDA: 34.1 MMHG (ref 35–45)
PCO2 BLDA: 34.9 MMHG (ref 35–45)
PCO2 BLDA: 45 MMHG (ref 35–45)
PCO2 BLDA: 45.6 MMHG (ref 35–45)
PCO2 BLDA: 45.9 MMHG (ref 35–45)
PCO2 BLDA: 48.1 MMHG (ref 35–45)
PCO2 BLDA: 55.5 MMHG (ref 35–45)
PEEP: 12
PEEP: 5
PEEP: 8
PH SMN: 7.43 [PH] (ref 7.35–7.45)
PH SMN: 7.45 [PH] (ref 7.35–7.45)
PH SMN: 7.47 [PH] (ref 7.35–7.45)
PH SMN: 7.51 [PH] (ref 7.35–7.45)
PH SMN: 7.53 [PH] (ref 7.35–7.45)
PH SMN: 7.53 [PH] (ref 7.35–7.45)
PH SMN: 7.54 [PH] (ref 7.35–7.45)
PH SMN: 7.55 [PH] (ref 7.35–7.45)
PH SMN: 7.56 [PH] (ref 7.35–7.45)
PH SMN: 7.58 [PH] (ref 7.35–7.45)
PH SMN: 7.59 [PH] (ref 7.35–7.45)
PH SMN: 7.64 [PH] (ref 7.35–7.45)
PH SMN: 7.66 [PH] (ref 7.35–7.45)
PH UR STRIP: 5 [PH] (ref 5–8)
PHOSPHATE SERPL-MCNC: 1.1 MG/DL
PHOSPHATE SERPL-MCNC: 1.9 MG/DL
PHOSPHATE SERPL-MCNC: 2 MG/DL
PHOSPHATE SERPL-MCNC: 2.1 MG/DL
PHOSPHATE SERPL-MCNC: 2.2 MG/DL
PHOSPHATE SERPL-MCNC: 2.2 MG/DL
PHOSPHATE SERPL-MCNC: 2.3 MG/DL
PHOSPHATE SERPL-MCNC: 2.8 MG/DL
PHOSPHATE SERPL-MCNC: 2.8 MG/DL
PHOSPHATE SERPL-MCNC: 2.9 MG/DL
PHOSPHATE SERPL-MCNC: 3 MG/DL
PHOSPHATE SERPL-MCNC: 3 MG/DL
PHOSPHATE SERPL-MCNC: 3.1 MG/DL
PHOSPHATE SERPL-MCNC: 3.2 MG/DL
PHOSPHATE SERPL-MCNC: 4 MG/DL
PIP: 28
PIP: 31
PIP: 33
PIP: 34
PLATELET # BLD AUTO: 100 K/UL
PLATELET # BLD AUTO: 102 K/UL
PLATELET # BLD AUTO: 105 K/UL
PLATELET # BLD AUTO: 109 K/UL
PLATELET # BLD AUTO: 110 K/UL
PLATELET # BLD AUTO: 111 K/UL
PLATELET # BLD AUTO: 113 K/UL
PLATELET # BLD AUTO: 116 K/UL
PLATELET # BLD AUTO: 116 K/UL
PLATELET # BLD AUTO: 119 K/UL
PLATELET # BLD AUTO: 123 K/UL
PLATELET # BLD AUTO: 125 K/UL
PLATELET # BLD AUTO: 126 K/UL
PLATELET # BLD AUTO: 138 K/UL
PLATELET # BLD AUTO: 141 K/UL
PLATELET # BLD AUTO: 167 K/UL
PLATELET # BLD AUTO: 189 K/UL
PLATELET # BLD AUTO: 197 K/UL
PLATELET # BLD AUTO: 197 K/UL
PLATELET # BLD AUTO: 200 K/UL
PLATELET # BLD AUTO: 205 K/UL
PLATELET # BLD AUTO: 205 K/UL
PLATELET # BLD AUTO: 209 K/UL
PLATELET # BLD AUTO: 73 K/UL
PLATELET # BLD AUTO: 75 K/UL
PLATELET # BLD AUTO: 85 K/UL
PLATELET # BLD AUTO: 86 K/UL
PLATELET # BLD AUTO: 86 K/UL
PLATELET # BLD AUTO: 87 K/UL
PLATELET BLD QL SMEAR: ABNORMAL
PMV BLD AUTO: 10.2 FL
PMV BLD AUTO: 10.3 FL
PMV BLD AUTO: 10.3 FL
PMV BLD AUTO: 10.4 FL
PMV BLD AUTO: 10.6 FL
PMV BLD AUTO: 10.6 FL
PMV BLD AUTO: 10.8 FL
PMV BLD AUTO: 11.1 FL
PMV BLD AUTO: 11.2 FL
PMV BLD AUTO: 11.3 FL
PMV BLD AUTO: 11.3 FL
PMV BLD AUTO: 11.7 FL
PMV BLD AUTO: 11.7 FL
PMV BLD AUTO: 11.8 FL
PMV BLD AUTO: 11.9 FL
PMV BLD AUTO: 12 FL
PMV BLD AUTO: 12.1 FL
PMV BLD AUTO: 12.2 FL
PMV BLD AUTO: 12.4 FL
PMV BLD AUTO: 12.5 FL
PMV BLD AUTO: 12.6 FL
PMV BLD AUTO: 12.7 FL
PMV BLD AUTO: 12.7 FL
PMV BLD AUTO: 13.1 FL
PMV BLD AUTO: 13.1 FL
PO2 BLDA: 127 MMHG (ref 80–100)
PO2 BLDA: 25 MMHG (ref 40–60)
PO2 BLDA: 339 MMHG (ref 80–100)
PO2 BLDA: 35 MMHG (ref 80–100)
PO2 BLDA: 55 MMHG (ref 80–100)
PO2 BLDA: 64 MMHG (ref 80–100)
PO2 BLDA: 64 MMHG (ref 80–100)
PO2 BLDA: 70 MMHG (ref 80–100)
PO2 BLDA: 78 MMHG (ref 80–100)
PO2 BLDA: 79 MMHG (ref 80–100)
PO2 BLDA: 81 MMHG (ref 80–100)
PO2 BLDA: 93 MMHG (ref 80–100)
PO2 BLDA: 99 MMHG (ref 80–100)
POC BE: 10 MMOL/L
POC BE: 14 MMOL/L
POC BE: 17 MMOL/L
POC BE: 17 MMOL/L
POC BE: 19 MMOL/L
POC BE: 5 MMOL/L
POC BE: 6 MMOL/L
POC BE: 6 MMOL/L
POC BE: 7 MMOL/L
POC BE: 7 MMOL/L
POC BE: 8 MMOL/L
POC SATURATED O2: 100 % (ref 95–100)
POC SATURATED O2: 52 % (ref 95–100)
POC SATURATED O2: 81 % (ref 95–100)
POC SATURATED O2: 89 % (ref 95–100)
POC SATURATED O2: 95 % (ref 95–100)
POC SATURATED O2: 95 % (ref 95–100)
POC SATURATED O2: 96 % (ref 95–100)
POC SATURATED O2: 96 % (ref 95–100)
POC SATURATED O2: 97 % (ref 95–100)
POC SATURATED O2: 97 % (ref 95–100)
POC SATURATED O2: 98 % (ref 95–100)
POC SATURATED O2: 98 % (ref 95–100)
POC SATURATED O2: 99 % (ref 95–100)
POC TCO2: 28 MMOL/L (ref 23–27)
POC TCO2: 29 MMOL/L (ref 23–27)
POC TCO2: 30 MMOL/L (ref 23–27)
POC TCO2: 31 MMOL/L (ref 23–27)
POC TCO2: 32 MMOL/L (ref 23–27)
POC TCO2: 33 MMOL/L (ref 23–27)
POC TCO2: 33 MMOL/L (ref 23–27)
POC TCO2: 38 MMOL/L (ref 23–27)
POC TCO2: 41 MMOL/L (ref 23–27)
POC TCO2: 41 MMOL/L (ref 24–29)
POC TCO2: 42 MMOL/L (ref 23–27)
POCT GLUCOSE: 106 MG/DL (ref 70–110)
POCT GLUCOSE: 137 MG/DL (ref 70–110)
POCT GLUCOSE: 149 MG/DL (ref 70–110)
POCT GLUCOSE: 150 MG/DL (ref 70–110)
POCT GLUCOSE: 159 MG/DL (ref 70–110)
POCT GLUCOSE: 180 MG/DL (ref 70–110)
POCT GLUCOSE: 182 MG/DL (ref 70–110)
POCT GLUCOSE: 193 MG/DL (ref 70–110)
POCT GLUCOSE: 210 MG/DL (ref 70–110)
POIKILOCYTOSIS BLD QL SMEAR: SLIGHT
POLYCHROMASIA BLD QL SMEAR: ABNORMAL
POTASSIUM SERPL-SCNC: 2.6 MMOL/L
POTASSIUM SERPL-SCNC: 2.8 MMOL/L
POTASSIUM SERPL-SCNC: 3 MMOL/L
POTASSIUM SERPL-SCNC: 3.3 MMOL/L
POTASSIUM SERPL-SCNC: 3.3 MMOL/L
POTASSIUM SERPL-SCNC: 3.4 MMOL/L
POTASSIUM SERPL-SCNC: 3.5 MMOL/L
POTASSIUM SERPL-SCNC: 3.7 MMOL/L
POTASSIUM SERPL-SCNC: 3.8 MMOL/L
POTASSIUM SERPL-SCNC: 3.9 MMOL/L
POTASSIUM SERPL-SCNC: 3.9 MMOL/L
POTASSIUM SERPL-SCNC: 4 MMOL/L
POTASSIUM SERPL-SCNC: 4.1 MMOL/L
POTASSIUM SERPL-SCNC: 4.2 MMOL/L
POTASSIUM SERPL-SCNC: 4.2 MMOL/L
POTASSIUM SERPL-SCNC: 4.3 MMOL/L
POTASSIUM SERPL-SCNC: 4.4 MMOL/L
POTASSIUM SERPL-SCNC: 5 MMOL/L
PROT FLD-MCNC: 2.3 G/DL
PROT SERPL-MCNC: 4 G/DL
PROT SERPL-MCNC: 4 G/DL
PROT SERPL-MCNC: 4.1 G/DL
PROT SERPL-MCNC: 4.3 G/DL
PROT SERPL-MCNC: 4.3 G/DL
PROT SERPL-MCNC: 4.4 G/DL
PROT SERPL-MCNC: 4.5 G/DL
PROT SERPL-MCNC: 4.6 G/DL
PROT SERPL-MCNC: 4.6 G/DL
PROT SERPL-MCNC: 4.7 G/DL
PROT SERPL-MCNC: 4.8 G/DL
PROT SERPL-MCNC: 4.9 G/DL
PROT SERPL-MCNC: 4.9 G/DL
PROT SERPL-MCNC: 5.1 G/DL
PROT UR QL STRIP: ABNORMAL
PROTHROMBIN TIME: 10 SEC
RBC # BLD AUTO: 1.85 M/UL
RBC # BLD AUTO: 1.87 M/UL
RBC # BLD AUTO: 2.15 M/UL
RBC # BLD AUTO: 2.26 M/UL
RBC # BLD AUTO: 2.45 M/UL
RBC # BLD AUTO: 2.45 M/UL
RBC # BLD AUTO: 2.51 M/UL
RBC # BLD AUTO: 2.57 M/UL
RBC # BLD AUTO: 2.62 M/UL
RBC # BLD AUTO: 2.64 M/UL
RBC # BLD AUTO: 2.69 M/UL
RBC # BLD AUTO: 2.7 M/UL
RBC # BLD AUTO: 2.7 M/UL
RBC # BLD AUTO: 2.73 M/UL
RBC # BLD AUTO: 2.75 M/UL
RBC # BLD AUTO: 2.76 M/UL
RBC # BLD AUTO: 2.79 M/UL
RBC # BLD AUTO: 2.81 M/UL
RBC # BLD AUTO: 2.88 M/UL
RBC # BLD AUTO: 2.92 M/UL
RBC # BLD AUTO: 2.97 M/UL
RBC # BLD AUTO: 2.99 M/UL
RBC # BLD AUTO: 3.03 M/UL
RBC # BLD AUTO: 3.04 M/UL
RBC # BLD AUTO: 3.44 M/UL
RBC # BLD AUTO: 3.55 M/UL
RBC # BLD AUTO: 3.84 M/UL
RBC #/AREA URNS AUTO: 3 /HPF (ref 0–4)
RETIRED EF AND QEF - SEE NOTES: 60 (ref 55–65)
SAMPLE: ABNORMAL
SCHISTOCYTES BLD QL SMEAR: ABNORMAL
SCHISTOCYTES BLD QL SMEAR: PRESENT
SITE: ABNORMAL
SODIUM SERPL-SCNC: 122 MMOL/L
SODIUM SERPL-SCNC: 140 MMOL/L
SODIUM SERPL-SCNC: 141 MMOL/L
SODIUM SERPL-SCNC: 141 MMOL/L
SODIUM SERPL-SCNC: 142 MMOL/L
SODIUM SERPL-SCNC: 142 MMOL/L
SODIUM SERPL-SCNC: 143 MMOL/L
SODIUM SERPL-SCNC: 144 MMOL/L
SODIUM SERPL-SCNC: 145 MMOL/L
SODIUM SERPL-SCNC: 145 MMOL/L
SP GR UR STRIP: >=1.03 (ref 1–1.03)
SP02: 100
SP02: 85
SP02: 94
SP02: 94
SP02: 96
SP02: 98
SP02: 98
SPECIMEN SOURCE: NORMAL
SPECIMEN SOURCE: NORMAL
SPHEROCYTES BLD QL SMEAR: ABNORMAL
SPONT RATE: 28
SPONT RATE: 32
SPONT RATE: 6
SQUAMOUS #/AREA URNS AUTO: 3 /HPF
STOMATOCYTES BLD QL SMEAR: PRESENT
TARGETS BLD QL SMEAR: ABNORMAL
TARGETS BLD QL SMEAR: ABNORMAL
TOXIC GRANULES BLD QL SMEAR: ABNORMAL
TOXIC GRANULES BLD QL SMEAR: PRESENT
TRANS ERYTHROCYTES VOL PATIENT: NORMAL ML
TRICUSPID VALVE REGURGITATION: NORMAL
TROPONIN I SERPL DL<=0.01 NG/ML-MCNC: 0.03 NG/ML
TROPONIN I SERPL DL<=0.01 NG/ML-MCNC: 0.08 NG/ML
TROPONIN I SERPL DL<=0.01 NG/ML-MCNC: 0.1 NG/ML
TROPONIN I SERPL DL<=0.01 NG/ML-MCNC: 0.12 NG/ML
URN SPEC COLLECT METH UR: ABNORMAL
UROBILINOGEN UR STRIP-ACNC: NEGATIVE EU/DL
VANCOMYCIN SERPL-MCNC: 19.5 UG/ML
VANCOMYCIN SERPL-MCNC: 20.5 UG/ML
VANCOMYCIN SERPL-MCNC: 21.2 UG/ML
VANCOMYCIN SERPL-MCNC: 25.2 UG/ML
VANCOMYCIN TROUGH SERPL-MCNC: 14.9 UG/ML
VANCOMYCIN TROUGH SERPL-MCNC: 19 UG/ML
VANCOMYCIN TROUGH SERPL-MCNC: 29.1 UG/ML
VANCOMYCIN TROUGH SERPL-MCNC: 30.4 UG/ML
VANCOMYCIN TROUGH SERPL-MCNC: 32.4 UG/ML
VANCOMYCIN TROUGH SERPL-MCNC: 9 UG/ML
VT: 350
VT: 428
VT: 451
WBC # BLD AUTO: 0.99 K/UL
WBC # BLD AUTO: 1.28 K/UL
WBC # BLD AUTO: 1.29 K/UL
WBC # BLD AUTO: 1.4 K/UL
WBC # BLD AUTO: 1.46 K/UL
WBC # BLD AUTO: 1.58 K/UL
WBC # BLD AUTO: 1.86 K/UL
WBC # BLD AUTO: 10.06 K/UL
WBC # BLD AUTO: 10.28 K/UL
WBC # BLD AUTO: 10.64 K/UL
WBC # BLD AUTO: 11.25 K/UL
WBC # BLD AUTO: 11.33 K/UL
WBC # BLD AUTO: 12.57 K/UL
WBC # BLD AUTO: 13.12 K/UL
WBC # BLD AUTO: 13.12 K/UL
WBC # BLD AUTO: 13.88 K/UL
WBC # BLD AUTO: 2.26 K/UL
WBC # BLD AUTO: 2.3 K/UL
WBC # BLD AUTO: 2.48 K/UL
WBC # BLD AUTO: 2.78 K/UL
WBC # BLD AUTO: 2.94 K/UL
WBC # BLD AUTO: 3.13 K/UL
WBC # BLD AUTO: 3.35 K/UL
WBC # BLD AUTO: 3.63 K/UL
WBC # BLD AUTO: 3.76 K/UL
WBC # BLD AUTO: 5.61 K/UL
WBC # BLD AUTO: 6.93 K/UL
WBC # BLD AUTO: 7.35 K/UL
WBC # BLD AUTO: 8.36 K/UL
WBC # FLD: 34 /CU MM
WBC # FLD: 37 /CU MM
WBC #/AREA URNS AUTO: 19 /HPF (ref 0–5)

## 2018-01-01 PROCEDURE — 25000003 PHARM REV CODE 250: Performed by: HOSPITALIST

## 2018-01-01 PROCEDURE — 82803 BLOOD GASES ANY COMBINATION: CPT

## 2018-01-01 PROCEDURE — 92610 EVALUATE SWALLOWING FUNCTION: CPT

## 2018-01-01 PROCEDURE — 82150 ASSAY OF AMYLASE: CPT

## 2018-01-01 PROCEDURE — 63600175 PHARM REV CODE 636 W HCPCS: Performed by: INTERNAL MEDICINE

## 2018-01-01 PROCEDURE — 27100092 HC HIGH FLOW DELIVERY CANNULA

## 2018-01-01 PROCEDURE — 27000221 HC OXYGEN, UP TO 24 HOURS

## 2018-01-01 PROCEDURE — 25000003 PHARM REV CODE 250: Performed by: STUDENT IN AN ORGANIZED HEALTH CARE EDUCATION/TRAINING PROGRAM

## 2018-01-01 PROCEDURE — 0BC68ZZ EXTIRPATION OF MATTER FROM RIGHT LOWER LOBE BRONCHUS, VIA NATURAL OR ARTIFICIAL OPENING ENDOSCOPIC: ICD-10-PCS | Performed by: INTERNAL MEDICINE

## 2018-01-01 PROCEDURE — 31622 DX BRONCHOSCOPE/WASH: CPT

## 2018-01-01 PROCEDURE — 25000003 PHARM REV CODE 250: Performed by: INTERNAL MEDICINE

## 2018-01-01 PROCEDURE — 87186 SC STD MICRODIL/AGAR DIL: CPT

## 2018-01-01 PROCEDURE — 94669 MECHANICAL CHEST WALL OSCILL: CPT

## 2018-01-01 PROCEDURE — 87102 FUNGUS ISOLATION CULTURE: CPT | Mod: 59

## 2018-01-01 PROCEDURE — 36600 WITHDRAWAL OF ARTERIAL BLOOD: CPT

## 2018-01-01 PROCEDURE — 63600175 PHARM REV CODE 636 W HCPCS

## 2018-01-01 PROCEDURE — 99900035 HC TECH TIME PER 15 MIN (STAT)

## 2018-01-01 PROCEDURE — 63600175 PHARM REV CODE 636 W HCPCS: Mod: JG | Performed by: HOSPITALIST

## 2018-01-01 PROCEDURE — 94640 AIRWAY INHALATION TREATMENT: CPT

## 2018-01-01 PROCEDURE — 80053 COMPREHEN METABOLIC PANEL: CPT

## 2018-01-01 PROCEDURE — 86022 PLATELET ANTIBODIES: CPT

## 2018-01-01 PROCEDURE — 80048 BASIC METABOLIC PNL TOTAL CA: CPT

## 2018-01-01 PROCEDURE — 82140 ASSAY OF AMMONIA: CPT

## 2018-01-01 PROCEDURE — 87210 SMEAR WET MOUNT SALINE/INK: CPT

## 2018-01-01 PROCEDURE — 20000000 HC ICU ROOM

## 2018-01-01 PROCEDURE — 25000003 PHARM REV CODE 250

## 2018-01-01 PROCEDURE — 84100 ASSAY OF PHOSPHORUS: CPT

## 2018-01-01 PROCEDURE — 0BJ08ZZ INSPECTION OF TRACHEOBRONCHIAL TREE, VIA NATURAL OR ARTIFICIAL OPENING ENDOSCOPIC: ICD-10-PCS | Performed by: INTERNAL MEDICINE

## 2018-01-01 PROCEDURE — 94668 MNPJ CHEST WALL SBSQ: CPT

## 2018-01-01 PROCEDURE — 99291 CRITICAL CARE FIRST HOUR: CPT | Mod: ,,, | Performed by: INTERNAL MEDICINE

## 2018-01-01 PROCEDURE — 80202 ASSAY OF VANCOMYCIN: CPT

## 2018-01-01 PROCEDURE — 85007 BL SMEAR W/DIFF WBC COUNT: CPT

## 2018-01-01 PROCEDURE — 85025 COMPLETE CBC W/AUTO DIFF WBC: CPT

## 2018-01-01 PROCEDURE — 25000242 PHARM REV CODE 250 ALT 637 W/ HCPCS: Performed by: STUDENT IN AN ORGANIZED HEALTH CARE EDUCATION/TRAINING PROGRAM

## 2018-01-01 PROCEDURE — 83735 ASSAY OF MAGNESIUM: CPT | Mod: 91

## 2018-01-01 PROCEDURE — 31500 INSERT EMERGENCY AIRWAY: CPT | Mod: ,,, | Performed by: ANESTHESIOLOGY

## 2018-01-01 PROCEDURE — 25000242 PHARM REV CODE 250 ALT 637 W/ HCPCS: Performed by: INTERNAL MEDICINE

## 2018-01-01 PROCEDURE — 84132 ASSAY OF SERUM POTASSIUM: CPT

## 2018-01-01 PROCEDURE — 83615 LACTATE (LD) (LDH) ENZYME: CPT

## 2018-01-01 PROCEDURE — 63600175 PHARM REV CODE 636 W HCPCS: Performed by: STUDENT IN AN ORGANIZED HEALTH CARE EDUCATION/TRAINING PROGRAM

## 2018-01-01 PROCEDURE — 85520 HEPARIN ASSAY: CPT

## 2018-01-01 PROCEDURE — 99291 CRITICAL CARE FIRST HOUR: CPT | Mod: 25,,, | Performed by: INTERNAL MEDICINE

## 2018-01-01 PROCEDURE — 97167 OT EVAL HIGH COMPLEX 60 MIN: CPT

## 2018-01-01 PROCEDURE — 84484 ASSAY OF TROPONIN QUANT: CPT

## 2018-01-01 PROCEDURE — 87040 BLOOD CULTURE FOR BACTERIA: CPT

## 2018-01-01 PROCEDURE — 93306 TTE W/DOPPLER COMPLETE: CPT

## 2018-01-01 PROCEDURE — G8997 SWALLOW GOAL STATUS: HCPCS | Mod: CK

## 2018-01-01 PROCEDURE — 85025 COMPLETE CBC W/AUTO DIFF WBC: CPT | Mod: 91

## 2018-01-01 PROCEDURE — 87206 SMEAR FLUORESCENT/ACID STAI: CPT

## 2018-01-01 PROCEDURE — 83880 ASSAY OF NATRIURETIC PEPTIDE: CPT

## 2018-01-01 PROCEDURE — 36415 COLL VENOUS BLD VENIPUNCTURE: CPT

## 2018-01-01 PROCEDURE — 99292 CRITICAL CARE ADDL 30 MIN: CPT | Mod: ,,, | Performed by: INTERNAL MEDICINE

## 2018-01-01 PROCEDURE — 94660 CPAP INITIATION&MGMT: CPT

## 2018-01-01 PROCEDURE — 99900025 HC BRONCHOSCOPY-ASST (STAT)

## 2018-01-01 PROCEDURE — 27100171 HC OXYGEN HIGH FLOW UP TO 24 HOURS

## 2018-01-01 PROCEDURE — 83735 ASSAY OF MAGNESIUM: CPT

## 2018-01-01 PROCEDURE — 31500 INSERT EMERGENCY AIRWAY: CPT

## 2018-01-01 PROCEDURE — 97803 MED NUTRITION INDIV SUBSEQ: CPT

## 2018-01-01 PROCEDURE — 94761 N-INVAS EAR/PLS OXIMETRY MLT: CPT

## 2018-01-01 PROCEDURE — 87106 FUNGI IDENTIFICATION YEAST: CPT | Mod: 59

## 2018-01-01 PROCEDURE — 97112 NEUROMUSCULAR REEDUCATION: CPT

## 2018-01-01 PROCEDURE — P9021 RED BLOOD CELLS UNIT: HCPCS

## 2018-01-01 PROCEDURE — 37799 UNLISTED PX VASCULAR SURGERY: CPT

## 2018-01-01 PROCEDURE — 93306 TTE W/DOPPLER COMPLETE: CPT | Mod: 26,,, | Performed by: INTERNAL MEDICINE

## 2018-01-01 PROCEDURE — 87205 SMEAR GRAM STAIN: CPT

## 2018-01-01 PROCEDURE — 99900026 HC AIRWAY MAINTENANCE (STAT)

## 2018-01-01 PROCEDURE — 97530 THERAPEUTIC ACTIVITIES: CPT

## 2018-01-01 PROCEDURE — 99292 CRITICAL CARE ADDL 30 MIN: CPT | Mod: 25,,, | Performed by: INTERNAL MEDICINE

## 2018-01-01 PROCEDURE — 82955 ASSAY OF G6PD ENZYME: CPT

## 2018-01-01 PROCEDURE — 94003 VENT MGMT INPAT SUBQ DAY: CPT

## 2018-01-01 PROCEDURE — 51702 INSERT TEMP BLADDER CATH: CPT

## 2018-01-01 PROCEDURE — 5A1935Z RESPIRATORY VENTILATION, LESS THAN 24 CONSECUTIVE HOURS: ICD-10-PCS | Performed by: INTERNAL MEDICINE

## 2018-01-01 PROCEDURE — 87070 CULTURE OTHR SPECIMN AEROBIC: CPT | Mod: 59

## 2018-01-01 PROCEDURE — 94664 DEMO&/EVAL PT USE INHALER: CPT

## 2018-01-01 PROCEDURE — 27000646 HC AEROBIKA DEVICE

## 2018-01-01 PROCEDURE — 31624 DX BRONCHOSCOPE/LAVAGE: CPT | Mod: 59,,, | Performed by: INTERNAL MEDICINE

## 2018-01-01 PROCEDURE — 27000190 HC CPAP FULL FACE MASK W/VALVE

## 2018-01-01 PROCEDURE — 43752 NASAL/OROGASTRIC W/TUBE PLMT: CPT

## 2018-01-01 PROCEDURE — 87077 CULTURE AEROBIC IDENTIFY: CPT

## 2018-01-01 PROCEDURE — 93010 ELECTROCARDIOGRAM REPORT: CPT | Mod: ,,, | Performed by: INTERNAL MEDICINE

## 2018-01-01 PROCEDURE — 0B9D8ZX DRAINAGE OF RIGHT MIDDLE LUNG LOBE, VIA NATURAL OR ARTIFICIAL OPENING ENDOSCOPIC, DIAGNOSTIC: ICD-10-PCS | Performed by: INTERNAL MEDICINE

## 2018-01-01 PROCEDURE — 99233 SBSQ HOSP IP/OBS HIGH 50: CPT | Mod: ,,, | Performed by: INTERNAL MEDICINE

## 2018-01-01 PROCEDURE — 85520 HEPARIN ASSAY: CPT | Mod: 91

## 2018-01-01 PROCEDURE — 84484 ASSAY OF TROPONIN QUANT: CPT | Mod: 91

## 2018-01-01 PROCEDURE — 86920 COMPATIBILITY TEST SPIN: CPT

## 2018-01-01 PROCEDURE — 25000003 PHARM REV CODE 250: Performed by: PSYCHIATRY & NEUROLOGY

## 2018-01-01 PROCEDURE — 94002 VENT MGMT INPAT INIT DAY: CPT

## 2018-01-01 PROCEDURE — 25500020 PHARM REV CODE 255: Performed by: INTERNAL MEDICINE

## 2018-01-01 PROCEDURE — 81025 URINE PREGNANCY TEST: CPT

## 2018-01-01 PROCEDURE — 63600175 PHARM REV CODE 636 W HCPCS: Mod: JG | Performed by: INTERNAL MEDICINE

## 2018-01-01 PROCEDURE — 87102 FUNGUS ISOLATION CULTURE: CPT

## 2018-01-01 PROCEDURE — 25000003 PHARM REV CODE 250: Performed by: NURSE PRACTITIONER

## 2018-01-01 PROCEDURE — 80202 ASSAY OF VANCOMYCIN: CPT | Mod: 91

## 2018-01-01 PROCEDURE — 92526 ORAL FUNCTION THERAPY: CPT

## 2018-01-01 PROCEDURE — 87305 ASPERGILLUS AG IA: CPT

## 2018-01-01 PROCEDURE — 86850 RBC ANTIBODY SCREEN: CPT

## 2018-01-01 PROCEDURE — 97161 PT EVAL LOW COMPLEX 20 MIN: CPT

## 2018-01-01 PROCEDURE — 31500 INSERT EMERGENCY AIRWAY: CPT | Mod: ,,, | Performed by: INTERNAL MEDICINE

## 2018-01-01 PROCEDURE — 31624 DX BRONCHOSCOPE/LAVAGE: CPT | Mod: RT,,, | Performed by: INTERNAL MEDICINE

## 2018-01-01 PROCEDURE — 97110 THERAPEUTIC EXERCISES: CPT

## 2018-01-01 PROCEDURE — 85027 COMPLETE CBC AUTOMATED: CPT

## 2018-01-01 PROCEDURE — 83605 ASSAY OF LACTIC ACID: CPT

## 2018-01-01 PROCEDURE — 31622 DX BRONCHOSCOPE/WASH: CPT | Mod: ,,, | Performed by: INTERNAL MEDICINE

## 2018-01-01 PROCEDURE — 32555 ASPIRATE PLEURA W/ IMAGING: CPT

## 2018-01-01 PROCEDURE — 87205 SMEAR GRAM STAIN: CPT | Mod: 59

## 2018-01-01 PROCEDURE — G8996 SWALLOW CURRENT STATUS: HCPCS | Mod: CN

## 2018-01-01 PROCEDURE — 87070 CULTURE OTHR SPECIMN AEROBIC: CPT

## 2018-01-01 PROCEDURE — 84100 ASSAY OF PHOSPHORUS: CPT | Mod: 91

## 2018-01-01 PROCEDURE — 97163 PT EVAL HIGH COMPLEX 45 MIN: CPT

## 2018-01-01 PROCEDURE — 83010 ASSAY OF HAPTOGLOBIN QUANT: CPT

## 2018-01-01 PROCEDURE — 88305 TISSUE EXAM BY PATHOLOGIST: CPT | Performed by: PATHOLOGY

## 2018-01-01 PROCEDURE — 89051 BODY FLUID CELL COUNT: CPT

## 2018-01-01 PROCEDURE — 94667 MNPJ CHEST WALL 1ST: CPT

## 2018-01-01 PROCEDURE — 83615 LACTATE (LD) (LDH) ENZYME: CPT | Mod: 91

## 2018-01-01 PROCEDURE — 36430 TRANSFUSION BLD/BLD COMPNT: CPT

## 2018-01-01 PROCEDURE — 99232 SBSQ HOSP IP/OBS MODERATE 35: CPT | Mod: ,,, | Performed by: INTERNAL MEDICINE

## 2018-01-01 PROCEDURE — 99222 1ST HOSP IP/OBS MODERATE 55: CPT | Mod: ,,, | Performed by: PSYCHIATRY & NEUROLOGY

## 2018-01-01 PROCEDURE — 0BH17EZ INSERTION OF ENDOTRACHEAL AIRWAY INTO TRACHEA, VIA NATURAL OR ARTIFICIAL OPENING: ICD-10-PCS | Performed by: INTERNAL MEDICINE

## 2018-01-01 PROCEDURE — 86901 BLOOD TYPING SEROLOGIC RH(D): CPT

## 2018-01-01 PROCEDURE — 36593 DECLOT VASCULAR DEVICE: CPT

## 2018-01-01 PROCEDURE — 51701 INSERT BLADDER CATHETER: CPT

## 2018-01-01 PROCEDURE — 87015 SPECIMEN INFECT AGNT CONCNTJ: CPT

## 2018-01-01 PROCEDURE — 0BH18EZ INSERTION OF ENDOTRACHEAL AIRWAY INTO TRACHEA, VIA NATURAL OR ARTIFICIAL OPENING ENDOSCOPIC: ICD-10-PCS | Performed by: INTERNAL MEDICINE

## 2018-01-01 PROCEDURE — 94799 UNLISTED PULMONARY SVC/PX: CPT

## 2018-01-01 PROCEDURE — 31720 CLEARANCE OF AIRWAYS: CPT

## 2018-01-01 PROCEDURE — 83036 HEMOGLOBIN GLYCOSYLATED A1C: CPT

## 2018-01-01 PROCEDURE — 93005 ELECTROCARDIOGRAM TRACING: CPT

## 2018-01-01 PROCEDURE — G8997 SWALLOW GOAL STATUS: HCPCS | Mod: CM

## 2018-01-01 PROCEDURE — C9113 INJ PANTOPRAZOLE SODIUM, VIA: HCPCS | Performed by: INTERNAL MEDICINE

## 2018-01-01 PROCEDURE — S0028 INJECTION, FAMOTIDINE, 20 MG: HCPCS | Performed by: INTERNAL MEDICINE

## 2018-01-01 PROCEDURE — 99254 IP/OBS CNSLTJ NEW/EST MOD 60: CPT | Mod: ,,, | Performed by: INTERNAL MEDICINE

## 2018-01-01 PROCEDURE — 88305 TISSUE EXAM BY PATHOLOGIST: CPT | Mod: 26,,, | Performed by: PATHOLOGY

## 2018-01-01 PROCEDURE — 32554 ASPIRATE PLEURA W/O IMAGING: CPT

## 2018-01-01 PROCEDURE — 27100233

## 2018-01-01 PROCEDURE — 87116 MYCOBACTERIA CULTURE: CPT

## 2018-01-01 PROCEDURE — 84155 ASSAY OF PROTEIN SERUM: CPT

## 2018-01-01 PROCEDURE — G8996 SWALLOW CURRENT STATUS: HCPCS | Mod: CL

## 2018-01-01 PROCEDURE — 0BCB8ZZ EXTIRPATION OF MATTER FROM LEFT LOWER LOBE BRONCHUS, VIA NATURAL OR ARTIFICIAL OPENING ENDOSCOPIC: ICD-10-PCS | Performed by: INTERNAL MEDICINE

## 2018-01-01 PROCEDURE — 03HY32Z INSERTION OF MONITORING DEVICE INTO UPPER ARTERY, PERCUTANEOUS APPROACH: ICD-10-PCS | Performed by: INTERNAL MEDICINE

## 2018-01-01 PROCEDURE — 87449 NOS EACH ORGANISM AG IA: CPT

## 2018-01-01 PROCEDURE — 5A1945Z RESPIRATORY VENTILATION, 24-96 CONSECUTIVE HOURS: ICD-10-PCS | Performed by: INTERNAL MEDICINE

## 2018-01-01 PROCEDURE — 84157 ASSAY OF PROTEIN OTHER: CPT

## 2018-01-01 PROCEDURE — 0B9H8ZX DRAINAGE OF LUNG LINGULA, VIA NATURAL OR ARTIFICIAL OPENING ENDOSCOPIC, DIAGNOSTIC: ICD-10-PCS | Performed by: INTERNAL MEDICINE

## 2018-01-01 PROCEDURE — 27201109 HC SYSTEM FECAL MANAGEMENT

## 2018-01-01 PROCEDURE — 32554 ASPIRATE PLEURA W/O IMAGING: CPT | Mod: RT,,, | Performed by: INTERNAL MEDICINE

## 2018-01-01 PROCEDURE — 0W993ZX DRAINAGE OF RIGHT PLEURAL CAVITY, PERCUTANEOUS APPROACH, DIAGNOSTIC: ICD-10-PCS | Performed by: INTERNAL MEDICINE

## 2018-01-01 PROCEDURE — 82945 GLUCOSE OTHER FLUID: CPT

## 2018-01-01 PROCEDURE — C1729 CATH, DRAINAGE: HCPCS

## 2018-01-01 PROCEDURE — 88112 CYTOPATH CELL ENHANCE TECH: CPT | Mod: 26,,, | Performed by: PATHOLOGY

## 2018-01-01 PROCEDURE — 85610 PROTHROMBIN TIME: CPT

## 2018-01-01 PROCEDURE — 99232 SBSQ HOSP IP/OBS MODERATE 35: CPT | Mod: ,,, | Performed by: PSYCHIATRY & NEUROLOGY

## 2018-01-01 PROCEDURE — 81001 URINALYSIS AUTO W/SCOPE: CPT

## 2018-01-01 RX ORDER — FAMOTIDINE 10 MG/ML
20 INJECTION INTRAVENOUS 2 TIMES DAILY
Status: DISCONTINUED | OUTPATIENT
Start: 2018-01-01 | End: 2018-01-01

## 2018-01-01 RX ORDER — ACETAMINOPHEN 325 MG/1
650 TABLET ORAL EVERY 4 HOURS PRN
Status: DISCONTINUED | OUTPATIENT
Start: 2018-01-01 | End: 2018-01-01

## 2018-01-01 RX ORDER — DEXMEDETOMIDINE HYDROCHLORIDE 4 UG/ML
0.2 INJECTION, SOLUTION INTRAVENOUS CONTINUOUS
Status: DISCONTINUED | OUTPATIENT
Start: 2018-01-01 | End: 2018-01-01

## 2018-01-01 RX ORDER — NOREPINEPHRINE BITARTRATE/D5W 4MG/250ML
0.02 PLASTIC BAG, INJECTION (ML) INTRAVENOUS CONTINUOUS
Status: DISCONTINUED | OUTPATIENT
Start: 2018-01-01 | End: 2018-01-01

## 2018-01-01 RX ORDER — LIDOCAINE HYDROCHLORIDE 10 MG/ML
1 INJECTION INFILTRATION; PERINEURAL ONCE
Status: COMPLETED | OUTPATIENT
Start: 2018-01-01 | End: 2018-01-01

## 2018-01-01 RX ORDER — SUCCINYLCHOLINE CHLORIDE 20 MG/ML
INJECTION INTRAMUSCULAR; INTRAVENOUS
Status: COMPLETED
Start: 2018-01-01 | End: 2018-01-01

## 2018-01-01 RX ORDER — INSULIN ASPART 100 [IU]/ML
0-5 INJECTION, SOLUTION INTRAVENOUS; SUBCUTANEOUS EVERY 6 HOURS PRN
Status: DISCONTINUED | OUTPATIENT
Start: 2018-01-01 | End: 2018-01-01

## 2018-01-01 RX ORDER — NOREPINEPHRINE BITARTRATE/D5W 4MG/250ML
PLASTIC BAG, INJECTION (ML) INTRAVENOUS
Status: DISCONTINUED
Start: 2018-01-01 | End: 2018-01-01 | Stop reason: WASHOUT

## 2018-01-01 RX ORDER — ESMOLOL HYDROCHLORIDE 20 MG/ML
50 INJECTION, SOLUTION INTRAVENOUS CONTINUOUS
Status: DISCONTINUED | OUTPATIENT
Start: 2018-01-01 | End: 2018-01-01

## 2018-01-01 RX ORDER — FENTANYL CITRATE-0.9 % NACL/PF 10 MCG/ML
25 PLASTIC BAG, INJECTION (ML) INTRAVENOUS CONTINUOUS
Status: DISCONTINUED | OUTPATIENT
Start: 2018-01-01 | End: 2018-01-01

## 2018-01-01 RX ORDER — PANTOPRAZOLE SODIUM 40 MG/1
40 FOR SUSPENSION ORAL 2 TIMES DAILY
Status: DISCONTINUED | OUTPATIENT
Start: 2018-01-01 | End: 2018-01-01

## 2018-01-01 RX ORDER — ESCITALOPRAM OXALATE 10 MG/1
10 TABLET ORAL DAILY
Status: DISCONTINUED | OUTPATIENT
Start: 2018-01-01 | End: 2018-01-01

## 2018-01-01 RX ORDER — MAGNESIUM SULFATE HEPTAHYDRATE 40 MG/ML
4 INJECTION, SOLUTION INTRAVENOUS
Status: DISCONTINUED | OUTPATIENT
Start: 2018-01-01 | End: 2018-01-01

## 2018-01-01 RX ORDER — POLYETHYLENE GLYCOL 3350 17 G/17G
17 POWDER, FOR SOLUTION ORAL DAILY
Status: DISCONTINUED | OUTPATIENT
Start: 2018-01-01 | End: 2018-01-01

## 2018-01-01 RX ORDER — ETOMIDATE 2 MG/ML
INJECTION INTRAVENOUS
Status: DISPENSED
Start: 2018-01-01 | End: 2018-01-01

## 2018-01-01 RX ORDER — ESMOLOL HYDROCHLORIDE 20 MG/ML
INJECTION, SOLUTION INTRAVENOUS
Status: COMPLETED
Start: 2018-01-01 | End: 2018-01-01

## 2018-01-01 RX ORDER — SUCCINYLCHOLINE CHLORIDE 20 MG/ML
80 INJECTION INTRAMUSCULAR; INTRAVENOUS ONCE
Status: COMPLETED | OUTPATIENT
Start: 2018-01-01 | End: 2018-01-01

## 2018-01-01 RX ORDER — SODIUM CHLORIDE FOR INHALATION 3 %
4 VIAL, NEBULIZER (ML) INHALATION 2 TIMES DAILY
Status: DISCONTINUED | OUTPATIENT
Start: 2018-01-01 | End: 2018-01-01

## 2018-01-01 RX ORDER — FLUTICASONE FUROATE AND VILANTEROL 100; 25 UG/1; UG/1
1 POWDER RESPIRATORY (INHALATION) DAILY
Status: DISCONTINUED | OUTPATIENT
Start: 2018-01-01 | End: 2018-01-01

## 2018-01-01 RX ORDER — GUAIFENESIN 100 MG/5ML
400 SOLUTION ORAL EVERY 4 HOURS
Status: DISCONTINUED | OUTPATIENT
Start: 2018-01-01 | End: 2018-01-01

## 2018-01-01 RX ORDER — PHENYLEPHRINE HCL IN 0.9% NACL 1 MG/10 ML
SYRINGE (ML) INTRAVENOUS
Status: COMPLETED
Start: 2018-01-01 | End: 2018-01-01

## 2018-01-01 RX ORDER — METHYLPREDNISOLONE SOD SUCC 125 MG
VIAL (EA) INJECTION
Status: DISCONTINUED
Start: 2018-01-01 | End: 2018-01-01 | Stop reason: WASHOUT

## 2018-01-01 RX ORDER — HEPARIN SODIUM 10000 [USP'U]/100ML
17 INJECTION, SOLUTION INTRAVENOUS CONTINUOUS
Status: DISCONTINUED | OUTPATIENT
Start: 2018-01-01 | End: 2018-01-01

## 2018-01-01 RX ORDER — VERAPAMIL HYDROCHLORIDE 120 MG/1
240 TABLET, FILM COATED, EXTENDED RELEASE ORAL DAILY
Status: DISCONTINUED | OUTPATIENT
Start: 2018-01-01 | End: 2018-01-01

## 2018-01-01 RX ORDER — ESCITALOPRAM OXALATE 20 MG/1
20 TABLET ORAL DAILY
Status: DISCONTINUED | OUTPATIENT
Start: 2018-01-01 | End: 2018-01-01

## 2018-01-01 RX ORDER — ESCITALOPRAM OXALATE 5 MG/1
5 TABLET ORAL DAILY
Status: DISCONTINUED | OUTPATIENT
Start: 2018-01-01 | End: 2018-01-01

## 2018-01-01 RX ORDER — OLANZAPINE 2.5 MG/1
2.5 TABLET ORAL NIGHTLY
Status: DISCONTINUED | OUTPATIENT
Start: 2018-01-01 | End: 2018-01-01

## 2018-01-01 RX ORDER — POTASSIUM CHLORIDE 14.9 MG/ML
60 INJECTION INTRAVENOUS
Status: DISCONTINUED | OUTPATIENT
Start: 2018-01-01 | End: 2018-01-01

## 2018-01-01 RX ORDER — LORAZEPAM 2 MG/ML
2 INJECTION INTRAMUSCULAR EVERY 10 MIN PRN
Status: DISCONTINUED | OUTPATIENT
Start: 2018-01-01 | End: 2018-06-12 | Stop reason: HOSPADM

## 2018-01-01 RX ORDER — PROPOFOL 10 MG/ML
10 INJECTION, EMULSION INTRAVENOUS CONTINUOUS
Status: DISCONTINUED | OUTPATIENT
Start: 2018-01-01 | End: 2018-01-01

## 2018-01-01 RX ORDER — ETOMIDATE 2 MG/ML
20 INJECTION INTRAVENOUS ONCE
Status: COMPLETED | OUTPATIENT
Start: 2018-01-01 | End: 2018-01-01

## 2018-01-01 RX ORDER — PROPOFOL 10 MG/ML
INJECTION, EMULSION INTRAVENOUS
Status: COMPLETED
Start: 2018-01-01 | End: 2018-01-01

## 2018-01-01 RX ORDER — GUAIFENESIN 100 MG/5ML
400 SOLUTION ORAL EVERY 6 HOURS PRN
Status: DISCONTINUED | OUTPATIENT
Start: 2018-01-01 | End: 2018-01-01

## 2018-01-01 RX ORDER — HYDROCODONE BITARTRATE AND ACETAMINOPHEN 5; 325 MG/1; MG/1
1 TABLET ORAL EVERY 4 HOURS PRN
Status: DISCONTINUED | OUTPATIENT
Start: 2018-01-01 | End: 2018-01-01

## 2018-01-01 RX ORDER — HEPARIN SODIUM,PORCINE/D5W 25000/250
17 INTRAVENOUS SOLUTION INTRAVENOUS CONTINUOUS
Status: DISCONTINUED | OUTPATIENT
Start: 2018-01-01 | End: 2018-01-01 | Stop reason: DRUGHIGH

## 2018-01-01 RX ORDER — IPRATROPIUM BROMIDE AND ALBUTEROL SULFATE 2.5; .5 MG/3ML; MG/3ML
3 SOLUTION RESPIRATORY (INHALATION) EVERY 4 HOURS PRN
Status: DISCONTINUED | OUTPATIENT
Start: 2018-01-01 | End: 2018-01-01

## 2018-01-01 RX ORDER — ONDANSETRON 8 MG/1
8 TABLET, ORALLY DISINTEGRATING ORAL EVERY 8 HOURS PRN
Status: DISCONTINUED | OUTPATIENT
Start: 2018-01-01 | End: 2018-01-01

## 2018-01-01 RX ORDER — DICLOFENAC SODIUM 10 MG/G
GEL TOPICAL 2 TIMES DAILY
Status: DISCONTINUED | OUTPATIENT
Start: 2018-01-01 | End: 2018-01-01

## 2018-01-01 RX ORDER — MAGNESIUM SULFATE HEPTAHYDRATE 40 MG/ML
2 INJECTION, SOLUTION INTRAVENOUS
Status: DISCONTINUED | OUTPATIENT
Start: 2018-01-01 | End: 2018-01-01

## 2018-01-01 RX ORDER — HEPARIN SODIUM,PORCINE/D5W 25000/250
17 INTRAVENOUS SOLUTION INTRAVENOUS CONTINUOUS
Status: DISCONTINUED | OUTPATIENT
Start: 2018-01-01 | End: 2018-01-01

## 2018-01-01 RX ORDER — SODIUM CHLORIDE FOR INHALATION 3 %
4 VIAL, NEBULIZER (ML) INHALATION EVERY 6 HOURS PRN
Status: DISCONTINUED | OUTPATIENT
Start: 2018-01-01 | End: 2018-01-01

## 2018-01-01 RX ORDER — FENTANYL CITRATE 50 UG/ML
25 INJECTION, SOLUTION INTRAMUSCULAR; INTRAVENOUS
Status: DISCONTINUED | OUTPATIENT
Start: 2018-01-01 | End: 2018-01-01

## 2018-01-01 RX ORDER — GUAIFENESIN 600 MG/1
1200 TABLET, EXTENDED RELEASE ORAL 2 TIMES DAILY
Status: DISCONTINUED | OUTPATIENT
Start: 2018-01-01 | End: 2018-01-01

## 2018-01-01 RX ORDER — ALPRAZOLAM 0.25 MG/1
0.25 TABLET ORAL 2 TIMES DAILY PRN
Status: DISCONTINUED | OUTPATIENT
Start: 2018-01-01 | End: 2018-01-01

## 2018-01-01 RX ORDER — VANCOMYCIN HYDROCHLORIDE
1500
Status: DISCONTINUED | OUTPATIENT
Start: 2018-01-01 | End: 2018-01-01

## 2018-01-01 RX ORDER — FUROSEMIDE 10 MG/ML
60 INJECTION INTRAMUSCULAR; INTRAVENOUS DAILY
Status: DISCONTINUED | OUTPATIENT
Start: 2018-01-01 | End: 2018-01-01

## 2018-01-01 RX ORDER — HYDROCODONE BITARTRATE AND ACETAMINOPHEN 500; 5 MG/1; MG/1
TABLET ORAL
Status: DISCONTINUED | OUTPATIENT
Start: 2018-01-01 | End: 2018-01-01

## 2018-01-01 RX ORDER — FUROSEMIDE 10 MG/ML
80 INJECTION INTRAMUSCULAR; INTRAVENOUS ONCE
Status: COMPLETED | OUTPATIENT
Start: 2018-01-01 | End: 2018-01-01

## 2018-01-01 RX ORDER — FAMOTIDINE 10 MG/ML
20 INJECTION INTRAVENOUS ONCE
Status: COMPLETED | OUTPATIENT
Start: 2018-01-01 | End: 2018-01-01

## 2018-01-01 RX ORDER — MAG HYDROX/ALUMINUM HYD/SIMETH 200-200-20
30 SUSPENSION, ORAL (FINAL DOSE FORM) ORAL
Status: DISCONTINUED | OUTPATIENT
Start: 2018-01-01 | End: 2018-01-01

## 2018-01-01 RX ORDER — POTASSIUM CHLORIDE 29.8 MG/ML
40 INJECTION INTRAVENOUS ONCE
Status: DISCONTINUED | OUTPATIENT
Start: 2018-01-01 | End: 2018-01-01

## 2018-01-01 RX ORDER — ROCURONIUM BROMIDE 10 MG/ML
INJECTION, SOLUTION INTRAVENOUS
Status: COMPLETED
Start: 2018-01-01 | End: 2018-01-01

## 2018-01-01 RX ORDER — LACTULOSE 10 G/15ML
30 SOLUTION ORAL 2 TIMES DAILY
Status: DISCONTINUED | OUTPATIENT
Start: 2018-01-01 | End: 2018-01-01 | Stop reason: SDUPTHER

## 2018-01-01 RX ORDER — LABETALOL HYDROCHLORIDE 5 MG/ML
10 INJECTION, SOLUTION INTRAVENOUS ONCE
Status: COMPLETED | OUTPATIENT
Start: 2018-01-01 | End: 2018-01-01

## 2018-01-01 RX ORDER — FUROSEMIDE 10 MG/ML
60 INJECTION INTRAMUSCULAR; INTRAVENOUS ONCE
Status: COMPLETED | OUTPATIENT
Start: 2018-01-01 | End: 2018-01-01

## 2018-01-01 RX ORDER — ENOXAPARIN SODIUM 100 MG/ML
40 INJECTION SUBCUTANEOUS EVERY 24 HOURS
Status: DISCONTINUED | OUTPATIENT
Start: 2018-01-01 | End: 2018-01-01

## 2018-01-01 RX ORDER — SYRING-NEEDL,DISP,INSUL,0.3 ML 29 G X1/2"
296 SYRINGE, EMPTY DISPOSABLE MISCELLANEOUS
Status: DISPENSED | OUTPATIENT
Start: 2018-01-01 | End: 2018-01-01

## 2018-01-01 RX ORDER — ETOMIDATE 2 MG/ML
INJECTION INTRAVENOUS
Status: COMPLETED
Start: 2018-01-01 | End: 2018-01-01

## 2018-01-01 RX ORDER — ALPRAZOLAM 0.25 MG/1
0.25 TABLET ORAL ONCE AS NEEDED
Status: COMPLETED | OUTPATIENT
Start: 2018-01-01 | End: 2018-01-01

## 2018-01-01 RX ORDER — LACTULOSE 10 G/15ML
20 SOLUTION ORAL 2 TIMES DAILY
Status: DISCONTINUED | OUTPATIENT
Start: 2018-01-01 | End: 2018-01-01

## 2018-01-01 RX ORDER — SODIUM CHLORIDE 0.9 % (FLUSH) 0.9 %
3 SYRINGE (ML) INJECTION
Status: DISCONTINUED | OUTPATIENT
Start: 2018-01-01 | End: 2018-01-01

## 2018-01-01 RX ORDER — OLANZAPINE 2.5 MG/1
2.5 TABLET ORAL DAILY
Status: DISCONTINUED | OUTPATIENT
Start: 2018-01-01 | End: 2018-01-01

## 2018-01-01 RX ORDER — MAGNESIUM SULFATE HEPTAHYDRATE 40 MG/ML
2 INJECTION, SOLUTION INTRAVENOUS ONCE
Status: COMPLETED | OUTPATIENT
Start: 2018-01-01 | End: 2018-01-01

## 2018-01-01 RX ORDER — POLYETHYLENE GLYCOL 3350 17 G/17G
17 POWDER, FOR SOLUTION ORAL 2 TIMES DAILY
Status: DISCONTINUED | OUTPATIENT
Start: 2018-01-01 | End: 2018-01-01

## 2018-01-01 RX ORDER — ALPRAZOLAM 0.5 MG/1
1 TABLET ORAL ONCE
Status: COMPLETED | OUTPATIENT
Start: 2018-01-01 | End: 2018-01-01

## 2018-01-01 RX ORDER — SIMETHICONE 80 MG
1 TABLET,CHEWABLE ORAL
Status: DISCONTINUED | OUTPATIENT
Start: 2018-01-01 | End: 2018-01-01

## 2018-01-01 RX ORDER — HEPARIN SODIUM,PORCINE/D5W 25000/250
8 INTRAVENOUS SOLUTION INTRAVENOUS CONTINUOUS
Status: DISCONTINUED | OUTPATIENT
Start: 2018-01-01 | End: 2018-01-01

## 2018-01-01 RX ORDER — ALPRAZOLAM 0.25 MG/1
0.25 TABLET ORAL ONCE
Status: COMPLETED | OUTPATIENT
Start: 2018-01-01 | End: 2018-01-01

## 2018-01-01 RX ORDER — VERAPAMIL HYDROCHLORIDE 80 MG/1
80 TABLET ORAL 3 TIMES DAILY
Status: DISCONTINUED | OUTPATIENT
Start: 2018-01-01 | End: 2018-01-01

## 2018-01-01 RX ORDER — POTASSIUM CHLORIDE 20 MEQ/15ML
40 SOLUTION ORAL ONCE
Status: COMPLETED | OUTPATIENT
Start: 2018-01-01 | End: 2018-01-01

## 2018-01-01 RX ORDER — VORICONAZOLE 10 MG/ML
4 INJECTION, POWDER, LYOPHILIZED, FOR SOLUTION INTRAVENOUS 2 TIMES DAILY
Status: DISCONTINUED | OUTPATIENT
Start: 2018-01-01 | End: 2018-01-01

## 2018-01-01 RX ORDER — POTASSIUM CHLORIDE 29.8 MG/ML
40 INJECTION INTRAVENOUS
Status: DISCONTINUED | OUTPATIENT
Start: 2018-01-01 | End: 2018-01-01

## 2018-01-01 RX ORDER — PROPOFOL 10 MG/ML
5 INJECTION, EMULSION INTRAVENOUS CONTINUOUS
Status: DISCONTINUED | OUTPATIENT
Start: 2018-01-01 | End: 2018-01-01

## 2018-01-01 RX ORDER — PROMETHAZINE HYDROCHLORIDE 12.5 MG/1
25 TABLET ORAL EVERY 6 HOURS PRN
Status: DISCONTINUED | OUTPATIENT
Start: 2018-01-01 | End: 2018-01-01

## 2018-01-01 RX ORDER — PHENYLEPHRINE HYDROCHLORIDE 10 MG/ML
INJECTION INTRAVENOUS
Status: COMPLETED
Start: 2018-01-01 | End: 2018-01-01

## 2018-01-01 RX ORDER — MORPHINE SULFATE 2 MG/ML
2 INJECTION, SOLUTION INTRAMUSCULAR; INTRAVENOUS
Status: DISCONTINUED | OUTPATIENT
Start: 2018-01-01 | End: 2018-06-12 | Stop reason: HOSPADM

## 2018-01-01 RX ORDER — PRAVASTATIN SODIUM 40 MG/1
80 TABLET ORAL DAILY
Status: DISCONTINUED | OUTPATIENT
Start: 2018-01-01 | End: 2018-01-01

## 2018-01-01 RX ORDER — OLANZAPINE 2.5 MG/1
2.5 TABLET ORAL EVERY 8 HOURS PRN
Status: DISCONTINUED | OUTPATIENT
Start: 2018-01-01 | End: 2018-01-01

## 2018-01-01 RX ORDER — GLUCAGON 1 MG
1 KIT INJECTION
Status: DISCONTINUED | OUTPATIENT
Start: 2018-01-01 | End: 2018-01-01

## 2018-01-01 RX ORDER — POTASSIUM CHLORIDE 29.8 MG/ML
40 INJECTION INTRAVENOUS ONCE
Status: COMPLETED | OUTPATIENT
Start: 2018-01-01 | End: 2018-01-01

## 2018-01-01 RX ORDER — POLYETHYLENE GLYCOL 3350 17 G/17G
17 POWDER, FOR SOLUTION ORAL 4 TIMES DAILY
Status: DISCONTINUED | OUTPATIENT
Start: 2018-01-01 | End: 2018-01-01

## 2018-01-01 RX ORDER — DAPSONE 100 MG/1
100 TABLET ORAL DAILY
Status: DISCONTINUED | OUTPATIENT
Start: 2018-01-01 | End: 2018-01-01

## 2018-01-01 RX ORDER — 3% SODIUM CHLORIDE 3 G/100ML
4 INJECTION, SOLUTION INTRAVENOUS EVERY 6 HOURS PRN
Status: DISCONTINUED | OUTPATIENT
Start: 2018-01-01 | End: 2018-01-01

## 2018-01-01 RX ORDER — SODIUM CHLORIDE FOR INHALATION 3 %
4 VIAL, NEBULIZER (ML) INHALATION ONCE
Status: COMPLETED | OUTPATIENT
Start: 2018-01-01 | End: 2018-01-01

## 2018-01-01 RX ORDER — FAMOTIDINE 20 MG/1
20 TABLET, FILM COATED ORAL 2 TIMES DAILY
Status: DISCONTINUED | OUTPATIENT
Start: 2018-01-01 | End: 2018-01-01

## 2018-01-01 RX ORDER — LACTULOSE 10 G/15ML
15 SOLUTION ORAL 3 TIMES DAILY
Status: DISCONTINUED | OUTPATIENT
Start: 2018-01-01 | End: 2018-01-01

## 2018-01-01 RX ORDER — IPRATROPIUM BROMIDE AND ALBUTEROL SULFATE 2.5; .5 MG/3ML; MG/3ML
3 SOLUTION RESPIRATORY (INHALATION) EVERY 4 HOURS
Status: DISCONTINUED | OUTPATIENT
Start: 2018-01-01 | End: 2018-01-01

## 2018-01-01 RX ORDER — PSEUDOEPHEDRINE/ACETAMINOPHEN 30MG-500MG
100 TABLET ORAL
Status: DISPENSED | OUTPATIENT
Start: 2018-01-01 | End: 2018-01-01

## 2018-01-01 RX ORDER — TIOTROPIUM BROMIDE 18 UG/1
1 CAPSULE ORAL; RESPIRATORY (INHALATION) DAILY
Status: DISCONTINUED | OUTPATIENT
Start: 2018-01-01 | End: 2018-01-01

## 2018-01-01 RX ORDER — RAMELTEON 8 MG/1
8 TABLET ORAL NIGHTLY PRN
Status: DISCONTINUED | OUTPATIENT
Start: 2018-01-01 | End: 2018-01-01

## 2018-01-01 RX ORDER — POTASSIUM CHLORIDE 20 MEQ/15ML
60 SOLUTION ORAL ONCE
Status: COMPLETED | OUTPATIENT
Start: 2018-01-01 | End: 2018-01-01

## 2018-01-01 RX ORDER — PANTOPRAZOLE SODIUM 40 MG/1
40 TABLET, DELAYED RELEASE ORAL 2 TIMES DAILY
Status: DISCONTINUED | OUTPATIENT
Start: 2018-01-01 | End: 2018-01-01

## 2018-01-01 RX ORDER — POTASSIUM CHLORIDE 29.8 MG/ML
80 INJECTION INTRAVENOUS
Status: DISCONTINUED | OUTPATIENT
Start: 2018-01-01 | End: 2018-01-01

## 2018-01-01 RX ORDER — LABETALOL HYDROCHLORIDE 5 MG/ML
10 INJECTION, SOLUTION INTRAVENOUS EVERY 6 HOURS PRN
Status: DISCONTINUED | OUTPATIENT
Start: 2018-01-01 | End: 2018-01-01

## 2018-01-01 RX ORDER — POTASSIUM CHLORIDE 14.9 MG/ML
20 INJECTION INTRAVENOUS ONCE
Status: COMPLETED | OUTPATIENT
Start: 2018-01-01 | End: 2018-01-01

## 2018-01-01 RX ORDER — FUROSEMIDE 10 MG/ML
80 INJECTION INTRAMUSCULAR; INTRAVENOUS ONCE
Status: DISCONTINUED | OUTPATIENT
Start: 2018-01-01 | End: 2018-01-01

## 2018-01-01 RX ORDER — ALPRAZOLAM 0.5 MG/1
0.5 TABLET ORAL 2 TIMES DAILY PRN
Status: DISCONTINUED | OUTPATIENT
Start: 2018-01-01 | End: 2018-01-01

## 2018-01-01 RX ORDER — SUCCINYLCHOLINE CHLORIDE 20 MG/ML
INJECTION INTRAMUSCULAR; INTRAVENOUS
Status: DISPENSED
Start: 2018-01-01 | End: 2018-01-01

## 2018-01-01 RX ORDER — POTASSIUM CHLORIDE 29.8 MG/ML
40 INJECTION INTRAVENOUS EVERY 4 HOURS
Status: COMPLETED | OUTPATIENT
Start: 2018-01-01 | End: 2018-01-01

## 2018-01-01 RX ORDER — FENTANYL CITRATE 50 UG/ML
25 INJECTION, SOLUTION INTRAMUSCULAR; INTRAVENOUS ONCE
Status: COMPLETED | OUTPATIENT
Start: 2018-01-01 | End: 2018-01-01

## 2018-01-01 RX ORDER — VERAPAMIL HYDROCHLORIDE 180 MG/1
180 TABLET, FILM COATED, EXTENDED RELEASE ORAL DAILY
Status: DISCONTINUED | OUTPATIENT
Start: 2018-01-01 | End: 2018-01-01

## 2018-01-01 RX ORDER — NOREPINEPHRINE BITARTRATE/D5W 4MG/250ML
PLASTIC BAG, INJECTION (ML) INTRAVENOUS
Status: COMPLETED
Start: 2018-01-01 | End: 2018-01-01

## 2018-01-01 RX ORDER — SODIUM CHLORIDE FOR INHALATION 3 %
4 VIAL, NEBULIZER (ML) INHALATION EVERY 4 HOURS PRN
Status: DISCONTINUED | OUTPATIENT
Start: 2018-01-01 | End: 2018-01-01

## 2018-01-01 RX ORDER — VERAPAMIL HYDROCHLORIDE 120 MG/1
120 TABLET, FILM COATED, EXTENDED RELEASE ORAL DAILY
Status: DISCONTINUED | OUTPATIENT
Start: 2018-01-01 | End: 2018-01-01

## 2018-01-01 RX ORDER — FUROSEMIDE 10 MG/ML
60 INJECTION INTRAMUSCULAR; INTRAVENOUS 2 TIMES DAILY
Status: DISCONTINUED | OUTPATIENT
Start: 2018-01-01 | End: 2018-01-01

## 2018-01-01 RX ORDER — LIDOCAINE 50 MG/G
OINTMENT TOPICAL
Status: DISCONTINUED | OUTPATIENT
Start: 2018-01-01 | End: 2018-01-01

## 2018-01-01 RX ORDER — LACTULOSE 10 G/15ML
20 SOLUTION ORAL 3 TIMES DAILY
Status: DISCONTINUED | OUTPATIENT
Start: 2018-01-01 | End: 2018-01-01

## 2018-01-01 RX ORDER — PHENYLEPHRINE HCL IN 0.9% NACL 1 MG/10 ML
SYRINGE (ML) INTRAVENOUS
Status: DISPENSED
Start: 2018-01-01 | End: 2018-01-01

## 2018-01-01 RX ORDER — CHLORHEXIDINE GLUCONATE ORAL RINSE 1.2 MG/ML
15 SOLUTION DENTAL 2 TIMES DAILY
Status: DISCONTINUED | OUTPATIENT
Start: 2018-01-01 | End: 2018-01-01

## 2018-01-01 RX ORDER — GUAIFENESIN 100 MG/5ML
400 SOLUTION ORAL EVERY 4 HOURS PRN
Status: DISCONTINUED | OUTPATIENT
Start: 2018-01-01 | End: 2018-01-01

## 2018-01-01 RX ORDER — OLANZAPINE 10 MG/2ML
2.5 INJECTION, POWDER, FOR SOLUTION INTRAMUSCULAR EVERY 8 HOURS PRN
Status: DISCONTINUED | OUTPATIENT
Start: 2018-01-01 | End: 2018-01-01

## 2018-01-01 RX ORDER — PANTOPRAZOLE SODIUM 40 MG/1
40 TABLET, DELAYED RELEASE ORAL
Status: DISCONTINUED | OUTPATIENT
Start: 2018-01-01 | End: 2018-01-01

## 2018-01-01 RX ORDER — MONTELUKAST SODIUM 10 MG/1
10 TABLET ORAL DAILY
Status: DISCONTINUED | OUTPATIENT
Start: 2018-01-01 | End: 2018-01-01

## 2018-01-01 RX ORDER — ROCURONIUM BROMIDE 10 MG/ML
INJECTION, SOLUTION INTRAVENOUS
Status: DISPENSED
Start: 2018-01-01 | End: 2018-01-01

## 2018-01-01 RX ORDER — FUROSEMIDE 10 MG/ML
40 INJECTION INTRAMUSCULAR; INTRAVENOUS ONCE
Status: COMPLETED | OUTPATIENT
Start: 2018-01-01 | End: 2018-01-01

## 2018-01-01 RX ORDER — MIDAZOLAM HYDROCHLORIDE 1 MG/ML
INJECTION INTRAMUSCULAR; INTRAVENOUS
Status: COMPLETED
Start: 2018-01-01 | End: 2018-01-01

## 2018-01-01 RX ORDER — MORPHINE SULFATE 2 MG/ML
2 INJECTION, SOLUTION INTRAMUSCULAR; INTRAVENOUS ONCE
Status: COMPLETED | OUTPATIENT
Start: 2018-01-01 | End: 2018-01-01

## 2018-01-01 RX ORDER — LIDOCAINE 50 MG/G
1 PATCH TOPICAL
Status: DISCONTINUED | OUTPATIENT
Start: 2018-01-01 | End: 2018-01-01

## 2018-01-01 RX ORDER — PANTOPRAZOLE SODIUM 40 MG/1
40 TABLET, DELAYED RELEASE ORAL DAILY
Status: DISCONTINUED | OUTPATIENT
Start: 2018-01-01 | End: 2018-01-01

## 2018-01-01 RX ADMIN — HYPROMELLOSE 2910 1 DROP: 5 SOLUTION OPHTHALMIC at 09:06

## 2018-01-01 RX ADMIN — IPRATROPIUM BROMIDE AND ALBUTEROL SULFATE 3 ML: .5; 3 SOLUTION RESPIRATORY (INHALATION) at 11:06

## 2018-01-01 RX ADMIN — SODIUM CHLORIDE SOLN NEBU 3% 4 ML: 3 NEBU SOLN at 08:06

## 2018-01-01 RX ADMIN — PRAVASTATIN SODIUM 80 MG: 40 TABLET ORAL at 10:06

## 2018-01-01 RX ADMIN — ISAVUCONAZONIUM SULFATE 372 MG: 186 CAPSULE ORAL at 02:05

## 2018-01-01 RX ADMIN — METHYLPREDNISOLONE SODIUM SUCCINATE 60 MG: 40 INJECTION, POWDER, FOR SOLUTION INTRAMUSCULAR; INTRAVENOUS at 09:05

## 2018-01-01 RX ADMIN — CHLORHEXIDINE GLUCONATE 15 ML: 1.2 RINSE ORAL at 09:05

## 2018-01-01 RX ADMIN — DICLOFENAC: 10 GEL TOPICAL at 08:06

## 2018-01-01 RX ADMIN — SODIUM CHLORIDE SOLN NEBU 3% 4 ML: 3 NEBU SOLN at 08:05

## 2018-01-01 RX ADMIN — Medication 1 MG: at 09:06

## 2018-01-01 RX ADMIN — FUROSEMIDE 22.5 MG/HR: 10 INJECTION, SOLUTION INTRAMUSCULAR; INTRAVENOUS at 06:06

## 2018-01-01 RX ADMIN — IPRATROPIUM BROMIDE AND ALBUTEROL SULFATE 3 ML: .5; 3 SOLUTION RESPIRATORY (INHALATION) at 07:05

## 2018-01-01 RX ADMIN — BACLOFEN 5 MG: 10 TABLET ORAL at 09:05

## 2018-01-01 RX ADMIN — SODIUM CHLORIDE SOLN NEBU 3% 4 ML: 3 NEBU SOLN at 07:05

## 2018-01-01 RX ADMIN — METHYLPREDNISOLONE SODIUM SUCCINATE 80 MG: 40 INJECTION, POWDER, FOR SOLUTION INTRAMUSCULAR; INTRAVENOUS at 09:05

## 2018-01-01 RX ADMIN — POLYETHYLENE GLYCOL 3350 17 G: 17 POWDER, FOR SOLUTION ORAL at 05:06

## 2018-01-01 RX ADMIN — MEROPENEM 2 G: 1 INJECTION, POWDER, FOR SOLUTION INTRAVENOUS at 12:06

## 2018-01-01 RX ADMIN — MORPHINE SULFATE 2 MG: 2 INJECTION, SOLUTION INTRAMUSCULAR; INTRAVENOUS at 04:06

## 2018-01-01 RX ADMIN — ALPRAZOLAM 0.5 MG: 0.5 TABLET ORAL at 09:05

## 2018-01-01 RX ADMIN — SODIUM PHOSPHATE, MONOBASIC, MONOHYDRATE 20.01 MMOL: 276; 142 INJECTION, SOLUTION INTRAVENOUS at 08:06

## 2018-01-01 RX ADMIN — SODIUM PHOSPHATE, MONOBASIC, MONOHYDRATE 20.01 MMOL: 276; 142 INJECTION, SOLUTION INTRAVENOUS at 10:06

## 2018-01-01 RX ADMIN — MONTELUKAST SODIUM 10 MG: 10 TABLET, FILM COATED ORAL at 09:06

## 2018-01-01 RX ADMIN — FAMOTIDINE 20 MG: 20 TABLET ORAL at 08:05

## 2018-01-01 RX ADMIN — HEPARIN SODIUM 17 UNITS/KG/HR: 10000 INJECTION, SOLUTION INTRAVENOUS at 08:05

## 2018-01-01 RX ADMIN — Medication 0.02 MCG/KG/MIN: at 11:05

## 2018-01-01 RX ADMIN — HEPARIN SODIUM 15 UNITS/KG/HR: 10000 INJECTION, SOLUTION INTRAVENOUS at 08:05

## 2018-01-01 RX ADMIN — MONTELUKAST SODIUM 10 MG: 10 TABLET, FILM COATED ORAL at 09:05

## 2018-01-01 RX ADMIN — BACLOFEN 5 MG: 10 TABLET ORAL at 08:06

## 2018-01-01 RX ADMIN — PRAVASTATIN SODIUM 80 MG: 40 TABLET ORAL at 09:05

## 2018-01-01 RX ADMIN — IPRATROPIUM BROMIDE AND ALBUTEROL SULFATE 3 ML: .5; 3 SOLUTION RESPIRATORY (INHALATION) at 12:05

## 2018-01-01 RX ADMIN — SODIUM CHLORIDE 372 MG: 9 INJECTION, SOLUTION INTRAVENOUS at 10:06

## 2018-01-01 RX ADMIN — CHLORHEXIDINE GLUCONATE 15 ML: 1.2 RINSE ORAL at 09:06

## 2018-01-01 RX ADMIN — SIMETHICONE CHEW TAB 80 MG 80 MG: 80 TABLET ORAL at 08:06

## 2018-01-01 RX ADMIN — ALPRAZOLAM 0.25 MG: 0.25 TABLET ORAL at 03:06

## 2018-01-01 RX ADMIN — VANCOMYCIN HYDROCHLORIDE 1750 MG: 1 INJECTION, POWDER, LYOPHILIZED, FOR SOLUTION INTRAVENOUS at 11:06

## 2018-01-01 RX ADMIN — ENOXAPARIN SODIUM 40 MG: 100 INJECTION SUBCUTANEOUS at 04:06

## 2018-01-01 RX ADMIN — DEXTROSE 330 MG: 50 INJECTION, SOLUTION INTRAVENOUS at 01:05

## 2018-01-01 RX ADMIN — IOHEXOL 100 ML: 350 INJECTION, SOLUTION INTRAVENOUS at 11:06

## 2018-01-01 RX ADMIN — VANCOMYCIN HYDROCHLORIDE 1500 MG: 10 INJECTION, POWDER, LYOPHILIZED, FOR SOLUTION INTRAVENOUS at 02:06

## 2018-01-01 RX ADMIN — DEXMEDETOMIDINE HYDROCHLORIDE 0.2 MCG/KG/HR: 4 INJECTION, SOLUTION INTRAVENOUS at 01:06

## 2018-01-01 RX ADMIN — POLYETHYLENE GLYCOL 3350 17 G: 17 POWDER, FOR SOLUTION ORAL at 09:06

## 2018-01-01 RX ADMIN — IPRATROPIUM BROMIDE AND ALBUTEROL SULFATE 3 ML: .5; 3 SOLUTION RESPIRATORY (INHALATION) at 11:05

## 2018-01-01 RX ADMIN — POLYETHYLENE GLYCOL 3350 17 G: 17 POWDER, FOR SOLUTION ORAL at 02:05

## 2018-01-01 RX ADMIN — SODIUM CHLORIDE SOLN NEBU 3% 4 ML: 3 NEBU SOLN at 07:06

## 2018-01-01 RX ADMIN — ALPRAZOLAM 0.25 MG: 0.25 TABLET ORAL at 03:05

## 2018-01-01 RX ADMIN — FAMOTIDINE 20 MG: 20 TABLET ORAL at 09:06

## 2018-01-01 RX ADMIN — PRAVASTATIN SODIUM 80 MG: 40 TABLET ORAL at 09:06

## 2018-01-01 RX ADMIN — Medication 0.02 MCG/KG/MIN: at 03:06

## 2018-01-01 RX ADMIN — LORAZEPAM 2 MG: 2 INJECTION INTRAMUSCULAR; INTRAVENOUS at 06:06

## 2018-01-01 RX ADMIN — GUAIFENESIN 1200 MG: 600 TABLET, EXTENDED RELEASE ORAL at 09:05

## 2018-01-01 RX ADMIN — FAMOTIDINE 20 MG: 10 INJECTION INTRAVENOUS at 09:05

## 2018-01-01 RX ADMIN — PROPOFOL 10 MCG/KG/MIN: 10 INJECTION, EMULSION INTRAVENOUS at 09:06

## 2018-01-01 RX ADMIN — FAMOTIDINE 20 MG: 20 TABLET ORAL at 08:06

## 2018-01-01 RX ADMIN — LACTULOSE 20 G: 20 SOLUTION ORAL at 09:05

## 2018-01-01 RX ADMIN — DICLOFENAC: 10 GEL TOPICAL at 09:06

## 2018-01-01 RX ADMIN — POLYETHYLENE GLYCOL 3350 17 G: 17 POWDER, FOR SOLUTION ORAL at 08:06

## 2018-01-01 RX ADMIN — ESCITALOPRAM 5 MG: 5 TABLET, FILM COATED ORAL at 09:06

## 2018-01-01 RX ADMIN — IPRATROPIUM BROMIDE AND ALBUTEROL SULFATE 3 ML: .5; 3 SOLUTION RESPIRATORY (INHALATION) at 03:06

## 2018-01-01 RX ADMIN — IPRATROPIUM BROMIDE AND ALBUTEROL SULFATE 3 ML: .5; 3 SOLUTION RESPIRATORY (INHALATION) at 08:05

## 2018-01-01 RX ADMIN — SODIUM CHLORIDE 372 MG: 9 INJECTION, SOLUTION INTRAVENOUS at 09:05

## 2018-01-01 RX ADMIN — FUROSEMIDE 60 MG: 10 INJECTION, SOLUTION INTRAMUSCULAR; INTRAVENOUS at 05:05

## 2018-01-01 RX ADMIN — PHENYLEPHRINE HYDROCHLORIDE 0.5 MCG/KG/MIN: 10 INJECTION INTRAVENOUS at 04:06

## 2018-01-01 RX ADMIN — IPRATROPIUM BROMIDE AND ALBUTEROL SULFATE 3 ML: .5; 3 SOLUTION RESPIRATORY (INHALATION) at 08:06

## 2018-01-01 RX ADMIN — VANCOMYCIN HYDROCHLORIDE 1500 MG: 10 INJECTION, POWDER, LYOPHILIZED, FOR SOLUTION INTRAVENOUS at 04:06

## 2018-01-01 RX ADMIN — FUROSEMIDE 29.5 MG/HR: 10 INJECTION, SOLUTION INTRAMUSCULAR; INTRAVENOUS at 02:06

## 2018-01-01 RX ADMIN — POTASSIUM CHLORIDE 40 MEQ: 20 SOLUTION ORAL at 06:05

## 2018-01-01 RX ADMIN — POTASSIUM CHLORIDE 40 MEQ: 400 INJECTION, SOLUTION INTRAVENOUS at 01:06

## 2018-01-01 RX ADMIN — DAPSONE 100 MG: 100 TABLET ORAL at 10:06

## 2018-01-01 RX ADMIN — BACLOFEN 5 MG: 10 TABLET ORAL at 03:05

## 2018-01-01 RX ADMIN — IPRATROPIUM BROMIDE AND ALBUTEROL SULFATE 3 ML: .5; 3 SOLUTION RESPIRATORY (INHALATION) at 04:06

## 2018-01-01 RX ADMIN — ESCITALOPRAM 20 MG: 20 TABLET, FILM COATED ORAL at 09:05

## 2018-01-01 RX ADMIN — BACLOFEN 5 MG: 10 TABLET ORAL at 09:06

## 2018-01-01 RX ADMIN — HEPARIN SODIUM 15 UNITS/KG/HR: 10000 INJECTION, SOLUTION INTRAVENOUS at 03:05

## 2018-01-01 RX ADMIN — GUAIFENESIN 400 MG: 200 SOLUTION ORAL at 04:05

## 2018-01-01 RX ADMIN — POTASSIUM CHLORIDE 40 MEQ: 400 INJECTION, SOLUTION INTRAVENOUS at 07:05

## 2018-01-01 RX ADMIN — Medication 0.5 MG: at 09:06

## 2018-01-01 RX ADMIN — BACLOFEN 5 MG: 10 TABLET ORAL at 10:06

## 2018-01-01 RX ADMIN — Medication 25 MCG/HR: at 02:05

## 2018-01-01 RX ADMIN — FUROSEMIDE 22 MG/HR: 10 INJECTION, SOLUTION INTRAMUSCULAR; INTRAVENOUS at 11:06

## 2018-01-01 RX ADMIN — MORPHINE SULFATE 2 MG: 2 INJECTION, SOLUTION INTRAMUSCULAR; INTRAVENOUS at 12:06

## 2018-01-01 RX ADMIN — MEROPENEM 2 G: 1 INJECTION, POWDER, FOR SOLUTION INTRAVENOUS at 08:05

## 2018-01-01 RX ADMIN — POTASSIUM CHLORIDE 40 MEQ: 400 INJECTION, SOLUTION INTRAVENOUS at 05:06

## 2018-01-01 RX ADMIN — POLYETHYLENE GLYCOL 3350 17 G: 17 POWDER, FOR SOLUTION ORAL at 09:05

## 2018-01-01 RX ADMIN — POTASSIUM CHLORIDE 40 MEQ: 400 INJECTION, SOLUTION INTRAVENOUS at 06:06

## 2018-01-01 RX ADMIN — LACTULOSE 20 G: 20 SOLUTION ORAL at 08:05

## 2018-01-01 RX ADMIN — POLYETHYLENE GLYCOL 3350 17 G: 17 POWDER, FOR SOLUTION ORAL at 08:05

## 2018-01-01 RX ADMIN — ALPRAZOLAM 0.25 MG: 0.25 TABLET ORAL at 08:06

## 2018-01-01 RX ADMIN — ESCITALOPRAM 20 MG: 20 TABLET, FILM COATED ORAL at 08:05

## 2018-01-01 RX ADMIN — SODIUM CHLORIDE, SODIUM LACTATE, POTASSIUM CHLORIDE, AND CALCIUM CHLORIDE 1000 ML: .6; .31; .03; .02 INJECTION, SOLUTION INTRAVENOUS at 10:06

## 2018-01-01 RX ADMIN — METHYLPREDNISOLONE SODIUM SUCCINATE 80 MG: 40 INJECTION, POWDER, FOR SOLUTION INTRAMUSCULAR; INTRAVENOUS at 09:06

## 2018-01-01 RX ADMIN — SUCCINYLCHOLINE CHLORIDE 20 MG: 20 INJECTION, SOLUTION INTRAMUSCULAR; INTRAVENOUS at 07:05

## 2018-01-01 RX ADMIN — METHYLPREDNISOLONE SODIUM SUCCINATE 60 MG: 40 INJECTION, POWDER, FOR SOLUTION INTRAMUSCULAR; INTRAVENOUS at 08:05

## 2018-01-01 RX ADMIN — IPRATROPIUM BROMIDE AND ALBUTEROL SULFATE 3 ML: .5; 3 SOLUTION RESPIRATORY (INHALATION) at 07:06

## 2018-01-01 RX ADMIN — HYPROMELLOSE 2910 1 DROP: 5 SOLUTION OPHTHALMIC at 06:05

## 2018-01-01 RX ADMIN — SODIUM CHLORIDE SOLN NEBU 3% 4 ML: 3 NEBU SOLN at 09:06

## 2018-01-01 RX ADMIN — METHYLPREDNISOLONE SODIUM SUCCINATE 80 MG: 40 INJECTION, POWDER, FOR SOLUTION INTRAMUSCULAR; INTRAVENOUS at 08:06

## 2018-01-01 RX ADMIN — LIDOCAINE HYDROCHLORIDE 1 ML: 10 INJECTION, SOLUTION INFILTRATION; PERINEURAL at 10:06

## 2018-01-01 RX ADMIN — POLYETHYLENE GLYCOL 3350 17 G: 17 POWDER, FOR SOLUTION ORAL at 03:05

## 2018-01-01 RX ADMIN — VERAPAMIL HYDROCHLORIDE 120 MG: 120 TABLET, FILM COATED, EXTENDED RELEASE ORAL at 02:06

## 2018-01-01 RX ADMIN — ISAVUCONAZONIUM SULFATE 372 MG: 186 CAPSULE ORAL at 08:06

## 2018-01-01 RX ADMIN — METHYLPREDNISOLONE SODIUM SUCCINATE 60 MG: 40 INJECTION, POWDER, FOR SOLUTION INTRAMUSCULAR; INTRAVENOUS at 01:05

## 2018-01-01 RX ADMIN — PHENYLEPHRINE HYDROCHLORIDE 0.6 MCG/KG/MIN: 10 INJECTION INTRAVENOUS at 11:06

## 2018-01-01 RX ADMIN — POTASSIUM CHLORIDE 40 MEQ: 20 SOLUTION ORAL at 04:05

## 2018-01-01 RX ADMIN — MICONAZOLE NITRATE: 20 OINTMENT TOPICAL at 09:06

## 2018-01-01 RX ADMIN — VASOPRESSIN 0.04 UNITS/MIN: 20 INJECTION INTRAVENOUS at 08:06

## 2018-01-01 RX ADMIN — VERAPAMIL HYDROCHLORIDE 240 MG: 120 TABLET, FILM COATED, EXTENDED RELEASE ORAL at 09:06

## 2018-01-01 RX ADMIN — CHLORHEXIDINE GLUCONATE 15 ML: 1.2 RINSE ORAL at 08:06

## 2018-01-01 RX ADMIN — POTASSIUM CHLORIDE 40 MEQ: 400 INJECTION, SOLUTION INTRAVENOUS at 05:05

## 2018-01-01 RX ADMIN — MICONAZOLE NITRATE: 20 OINTMENT TOPICAL at 10:06

## 2018-01-01 RX ADMIN — GUAIFENESIN 400 MG: 200 SOLUTION ORAL at 02:06

## 2018-01-01 RX ADMIN — GUAIFENESIN 1200 MG: 600 TABLET, EXTENDED RELEASE ORAL at 08:05

## 2018-01-01 RX ADMIN — MEROPENEM 2 G: 1 INJECTION, POWDER, FOR SOLUTION INTRAVENOUS at 09:06

## 2018-01-01 RX ADMIN — ALUMINUM HYDROXIDE, MAGNESIUM HYDROXIDE, AND SIMETHICONE 30 ML: 200; 200; 20 SUSPENSION ORAL at 06:06

## 2018-01-01 RX ADMIN — PROPOFOL 10 MCG/KG/MIN: 10 INJECTION, EMULSION INTRAVENOUS at 03:06

## 2018-01-01 RX ADMIN — IPRATROPIUM BROMIDE AND ALBUTEROL SULFATE 3 ML: .5; 3 SOLUTION RESPIRATORY (INHALATION) at 03:05

## 2018-01-01 RX ADMIN — VANCOMYCIN HYDROCHLORIDE 1250 MG: 1 INJECTION, POWDER, LYOPHILIZED, FOR SOLUTION INTRAVENOUS at 08:06

## 2018-01-01 RX ADMIN — IPRATROPIUM BROMIDE AND ALBUTEROL SULFATE 3 ML: .5; 3 SOLUTION RESPIRATORY (INHALATION) at 12:06

## 2018-01-01 RX ADMIN — ISAVUCONAZONIUM SULFATE 372 MG: 186 CAPSULE ORAL at 10:05

## 2018-01-01 RX ADMIN — GUAIFENESIN 400 MG: 200 SOLUTION ORAL at 06:06

## 2018-01-01 RX ADMIN — SODIUM CHLORIDE SOLN NEBU 3% 4 ML: 3 NEBU SOLN at 11:05

## 2018-01-01 RX ADMIN — LACTULOSE 20 G: 20 SOLUTION ORAL at 02:05

## 2018-01-01 RX ADMIN — MAGNESIUM SULFATE IN WATER 2 G: 40 INJECTION, SOLUTION INTRAVENOUS at 02:06

## 2018-01-01 RX ADMIN — HYPROMELLOSE 2910 1 DROP: 5 SOLUTION OPHTHALMIC at 11:06

## 2018-01-01 RX ADMIN — MEROPENEM 2 G: 1 INJECTION, POWDER, FOR SOLUTION INTRAVENOUS at 11:05

## 2018-01-01 RX ADMIN — BACLOFEN 5 MG: 10 TABLET ORAL at 08:05

## 2018-01-01 RX ADMIN — ACETAMINOPHEN 650 MG: 325 TABLET ORAL at 10:06

## 2018-01-01 RX ADMIN — MEROPENEM 2 G: 1 INJECTION, POWDER, FOR SOLUTION INTRAVENOUS at 05:05

## 2018-01-01 RX ADMIN — PRAVASTATIN SODIUM 80 MG: 40 TABLET ORAL at 10:05

## 2018-01-01 RX ADMIN — Medication 1250 MG: at 12:05

## 2018-01-01 RX ADMIN — POTASSIUM PHOSPHATE, MONOBASIC AND POTASSIUM PHOSPHATE, DIBASIC 30 MMOL: 224; 236 INJECTION, SOLUTION, CONCENTRATE INTRAVENOUS at 08:06

## 2018-01-01 RX ADMIN — POTASSIUM CHLORIDE 80 MEQ: 400 INJECTION, SOLUTION INTRAVENOUS at 04:06

## 2018-01-01 RX ADMIN — FENTANYL CITRATE 25 MCG: 50 INJECTION, SOLUTION INTRAMUSCULAR; INTRAVENOUS at 01:05

## 2018-01-01 RX ADMIN — ESCITALOPRAM 5 MG: 5 TABLET, FILM COATED ORAL at 10:06

## 2018-01-01 RX ADMIN — LIDOCAINE 1 PATCH: 50 PATCH CUTANEOUS at 10:06

## 2018-01-01 RX ADMIN — SODIUM CHLORIDE 250 ML: 9 INJECTION, SOLUTION INTRAVENOUS at 05:06

## 2018-01-01 RX ADMIN — HEPARIN SODIUM 8 UNITS/KG/HR: 10000 INJECTION, SOLUTION INTRAVENOUS at 12:06

## 2018-01-01 RX ADMIN — FUROSEMIDE 60 MG: 10 INJECTION, SOLUTION INTRAMUSCULAR; INTRAVENOUS at 02:05

## 2018-01-01 RX ADMIN — FLUTICASONE FUROATE AND VILANTEROL TRIFENATATE 1 PUFF: 100; 25 POWDER RESPIRATORY (INHALATION) at 09:05

## 2018-01-01 RX ADMIN — VERAPAMIL HYDROCHLORIDE 80 MG: 80 TABLET, FILM COATED ORAL at 04:06

## 2018-01-01 RX ADMIN — FUROSEMIDE 24 MG/HR: 10 INJECTION, SOLUTION INTRAMUSCULAR; INTRAVENOUS at 03:06

## 2018-01-01 RX ADMIN — POTASSIUM CHLORIDE 40 MEQ: 400 INJECTION, SOLUTION INTRAVENOUS at 09:05

## 2018-01-01 RX ADMIN — MONTELUKAST SODIUM 10 MG: 10 TABLET, FILM COATED ORAL at 10:05

## 2018-01-01 RX ADMIN — ALTEPLASE 2 MG: 2.2 INJECTION, POWDER, LYOPHILIZED, FOR SOLUTION INTRAVENOUS at 03:06

## 2018-01-01 RX ADMIN — MONTELUKAST SODIUM 10 MG: 10 TABLET, FILM COATED ORAL at 10:06

## 2018-01-01 RX ADMIN — METHYLPREDNISOLONE SODIUM SUCCINATE 80 MG: 40 INJECTION, POWDER, FOR SOLUTION INTRAMUSCULAR; INTRAVENOUS at 08:05

## 2018-01-01 RX ADMIN — Medication 0.2 MG: at 08:05

## 2018-01-01 RX ADMIN — FUROSEMIDE 60 MG: 10 INJECTION, SOLUTION INTRAMUSCULAR; INTRAVENOUS at 09:05

## 2018-01-01 RX ADMIN — BACLOFEN 5 MG: 10 TABLET ORAL at 02:05

## 2018-01-01 RX ADMIN — MORPHINE SULFATE 2 MG: 2 INJECTION, SOLUTION INTRAMUSCULAR; INTRAVENOUS at 10:06

## 2018-01-01 RX ADMIN — GUAIFENESIN 400 MG: 200 SOLUTION ORAL at 02:05

## 2018-01-01 RX ADMIN — POLYETHYLENE GLYCOL 3350 17 G: 17 POWDER, FOR SOLUTION ORAL at 05:05

## 2018-01-01 RX ADMIN — MEROPENEM 2 G: 1 INJECTION, POWDER, FOR SOLUTION INTRAVENOUS at 12:05

## 2018-01-01 RX ADMIN — PIPERACILLIN AND TAZOBACTAM 4.5 G: 4; .5 INJECTION, POWDER, LYOPHILIZED, FOR SOLUTION INTRAVENOUS; PARENTERAL at 09:05

## 2018-01-01 RX ADMIN — SUCCINYLCHOLINE CHLORIDE 80 MG: 20 INJECTION, SOLUTION INTRAMUSCULAR; INTRAVENOUS at 03:06

## 2018-01-01 RX ADMIN — VANCOMYCIN HYDROCHLORIDE 1500 MG: 10 INJECTION, POWDER, LYOPHILIZED, FOR SOLUTION INTRAVENOUS at 01:06

## 2018-01-01 RX ADMIN — Medication 1250 MG: at 01:05

## 2018-01-01 RX ADMIN — FUROSEMIDE 60 MG: 10 INJECTION, SOLUTION INTRAMUSCULAR; INTRAVENOUS at 07:05

## 2018-01-01 RX ADMIN — PANTOPRAZOLE SODIUM 40 MG: 40 TABLET, DELAYED RELEASE ORAL at 08:06

## 2018-01-01 RX ADMIN — MEROPENEM 2 G: 1 INJECTION, POWDER, FOR SOLUTION INTRAVENOUS at 04:05

## 2018-01-01 RX ADMIN — POTASSIUM CHLORIDE 60 MEQ: 200 INJECTION, SOLUTION INTRAVENOUS at 04:06

## 2018-01-01 RX ADMIN — MIDAZOLAM HYDROCHLORIDE: 1 INJECTION, SOLUTION INTRAMUSCULAR; INTRAVENOUS at 08:06

## 2018-01-01 RX ADMIN — MEROPENEM 2 G: 1 INJECTION, POWDER, FOR SOLUTION INTRAVENOUS at 10:05

## 2018-01-01 RX ADMIN — DEXMEDETOMIDINE HYDROCHLORIDE 0.2 MCG/KG/HR: 4 INJECTION, SOLUTION INTRAVENOUS at 08:05

## 2018-01-01 RX ADMIN — VERAPAMIL HYDROCHLORIDE 240 MG: 120 TABLET, FILM COATED, EXTENDED RELEASE ORAL at 09:05

## 2018-01-01 RX ADMIN — DEXTROSE 40 MG: 50 INJECTION, SOLUTION INTRAVENOUS at 09:06

## 2018-01-01 RX ADMIN — PANTOPRAZOLE SODIUM 40 MG: 40 TABLET, DELAYED RELEASE ORAL at 08:05

## 2018-01-01 RX ADMIN — Medication 0.7 MCG/KG/MIN: at 11:06

## 2018-01-01 RX ADMIN — Medication 0.8 MCG/KG/MIN: at 02:06

## 2018-01-01 RX ADMIN — FUROSEMIDE 40 MG: 10 INJECTION, SOLUTION INTRAMUSCULAR; INTRAVENOUS at 09:06

## 2018-01-01 RX ADMIN — GUAIFENESIN 400 MG: 200 SOLUTION ORAL at 09:06

## 2018-01-01 RX ADMIN — Medication 1250 MG: at 11:05

## 2018-01-01 RX ADMIN — VANCOMYCIN HYDROCHLORIDE 1250 MG: 1 INJECTION, POWDER, LYOPHILIZED, FOR SOLUTION INTRAVENOUS at 09:06

## 2018-01-01 RX ADMIN — PROPOFOL 25 MCG/KG/MIN: 10 INJECTION, EMULSION INTRAVENOUS at 04:06

## 2018-01-01 RX ADMIN — ENOXAPARIN SODIUM 40 MG: 100 INJECTION SUBCUTANEOUS at 09:06

## 2018-01-01 RX ADMIN — MAGNESIUM SULFATE IN WATER 2 G: 40 INJECTION, SOLUTION INTRAVENOUS at 05:06

## 2018-01-01 RX ADMIN — ESMOLOL HYDROCHLORIDE 50 MCG/KG/MIN: 20 INJECTION, SOLUTION INTRAVENOUS at 12:06

## 2018-01-01 RX ADMIN — VANCOMYCIN HYDROCHLORIDE 1250 MG: 1 INJECTION, POWDER, LYOPHILIZED, FOR SOLUTION INTRAVENOUS at 10:05

## 2018-01-01 RX ADMIN — ETOMIDATE 20 MG: 2 INJECTION, SOLUTION INTRAVENOUS at 03:06

## 2018-01-01 RX ADMIN — ALPRAZOLAM 0.5 MG: 0.5 TABLET ORAL at 06:05

## 2018-01-01 RX ADMIN — GUAIFENESIN 400 MG: 200 SOLUTION ORAL at 06:05

## 2018-01-01 RX ADMIN — ALPRAZOLAM 1 MG: 0.5 TABLET ORAL at 04:05

## 2018-01-01 RX ADMIN — PROPOFOL 100 MG: 10 INJECTION, EMULSION INTRAVENOUS at 08:06

## 2018-01-01 RX ADMIN — POTASSIUM CHLORIDE 60 MEQ: 20 SOLUTION ORAL at 09:05

## 2018-01-01 RX ADMIN — FAMOTIDINE 20 MG: 10 INJECTION INTRAVENOUS at 08:05

## 2018-01-01 RX ADMIN — BACLOFEN 5 MG: 10 TABLET ORAL at 02:06

## 2018-01-01 RX ADMIN — BACLOFEN 5 MG: 10 TABLET ORAL at 04:05

## 2018-01-01 RX ADMIN — MICONAZOLE NITRATE: 20 OINTMENT TOPICAL at 08:06

## 2018-01-01 RX ADMIN — GUAIFENESIN 400 MG: 200 SOLUTION ORAL at 11:05

## 2018-01-01 RX ADMIN — MEROPENEM 2 G: 1 INJECTION, POWDER, FOR SOLUTION INTRAVENOUS at 04:06

## 2018-01-01 RX ADMIN — ACETAMINOPHEN 650 MG: 325 TABLET ORAL at 09:06

## 2018-01-01 RX ADMIN — Medication 0.7 MCG/KG/MIN: at 01:06

## 2018-01-01 RX ADMIN — SODIUM CHLORIDE 372 MG: 9 INJECTION, SOLUTION INTRAVENOUS at 11:06

## 2018-01-01 RX ADMIN — ALPRAZOLAM 0.25 MG: 0.25 TABLET ORAL at 10:06

## 2018-01-01 RX ADMIN — HEPARIN SODIUM 17 UNITS/KG/HR: 10000 INJECTION, SOLUTION INTRAVENOUS at 02:05

## 2018-01-01 RX ADMIN — DAPSONE 100 MG: 100 TABLET ORAL at 09:06

## 2018-01-01 RX ADMIN — ETOMIDATE 20 MG: 2 INJECTION INTRAVENOUS at 03:06

## 2018-01-01 RX ADMIN — LACTULOSE 20 G: 20 SOLUTION ORAL at 04:05

## 2018-01-01 RX ADMIN — FAMOTIDINE 20 MG: 20 TABLET ORAL at 09:05

## 2018-01-01 RX ADMIN — DEXTROSE 330 MG: 50 INJECTION, SOLUTION INTRAVENOUS at 03:05

## 2018-01-01 RX ADMIN — PROPOFOL 50 MCG/KG/MIN: 10 INJECTION, EMULSION INTRAVENOUS at 10:06

## 2018-01-01 RX ADMIN — PROPOFOL 40 MCG/KG/MIN: 10 INJECTION, EMULSION INTRAVENOUS at 01:06

## 2018-01-01 RX ADMIN — LIDOCAINE: 50 OINTMENT TOPICAL at 05:06

## 2018-01-01 RX ADMIN — DAPSONE 100 MG: 100 TABLET ORAL at 09:05

## 2018-01-01 RX ADMIN — POTASSIUM CHLORIDE 60 MEQ: 200 INJECTION, SOLUTION INTRAVENOUS at 06:06

## 2018-01-01 RX ADMIN — DICLOFENAC: 10 GEL TOPICAL at 11:05

## 2018-01-01 RX ADMIN — Medication 0.5 MCG/KG/MIN: at 06:06

## 2018-01-01 RX ADMIN — PANTOPRAZOLE SODIUM 40 MG: 40 TABLET, DELAYED RELEASE ORAL at 10:06

## 2018-01-01 RX ADMIN — Medication 1 MG: at 04:06

## 2018-01-01 RX ADMIN — ALPRAZOLAM 0.5 MG: 0.5 TABLET ORAL at 10:05

## 2018-01-01 RX ADMIN — ACETAMINOPHEN 650 MG: 325 TABLET ORAL at 10:05

## 2018-01-01 RX ADMIN — POLYETHYLENE GLYCOL 3350 17 G: 17 POWDER, FOR SOLUTION ORAL at 10:06

## 2018-01-01 RX ADMIN — PANTOPRAZOLE SODIUM 40 MG: 40 TABLET, DELAYED RELEASE ORAL at 09:06

## 2018-01-01 RX ADMIN — FUROSEMIDE 30 MG/HR: 10 INJECTION, SOLUTION INTRAMUSCULAR; INTRAVENOUS at 09:06

## 2018-01-01 RX ADMIN — FUROSEMIDE 80 MG: 10 INJECTION, SOLUTION INTRAMUSCULAR; INTRAVENOUS at 11:06

## 2018-01-01 RX ADMIN — DAPSONE 100 MG: 100 TABLET ORAL at 11:05

## 2018-01-01 RX ADMIN — FLUTICASONE FUROATE AND VILANTEROL TRIFENATATE 1 PUFF: 100; 25 POWDER RESPIRATORY (INHALATION) at 08:05

## 2018-01-01 RX ADMIN — ENOXAPARIN SODIUM 40 MG: 100 INJECTION SUBCUTANEOUS at 06:06

## 2018-01-01 RX ADMIN — VANCOMYCIN HYDROCHLORIDE 1500 MG: 10 INJECTION, POWDER, LYOPHILIZED, FOR SOLUTION INTRAVENOUS at 03:06

## 2018-01-01 RX ADMIN — GUAIFENESIN 1200 MG: 600 TABLET, EXTENDED RELEASE ORAL at 01:05

## 2018-01-01 RX ADMIN — SODIUM CHLORIDE SOLN NEBU 3% 4 ML: 3 NEBU SOLN at 05:06

## 2018-01-01 RX ADMIN — SODIUM PHOSPHATE, MONOBASIC, MONOHYDRATE 20.01 MMOL: 276; 142 INJECTION, SOLUTION INTRAVENOUS at 06:06

## 2018-01-01 RX ADMIN — POLYETHYLENE GLYCOL 3350 17 G: 17 POWDER, FOR SOLUTION ORAL at 02:06

## 2018-01-01 RX ADMIN — FUROSEMIDE 80 MG: 10 INJECTION, SOLUTION INTRAMUSCULAR; INTRAVENOUS at 08:06

## 2018-01-01 RX ADMIN — Medication 0.2 MG: at 11:05

## 2018-01-01 RX ADMIN — BACLOFEN 5 MG: 10 TABLET ORAL at 04:06

## 2018-01-01 RX ADMIN — DICLOFENAC: 10 GEL TOPICAL at 08:05

## 2018-01-01 RX ADMIN — MONTELUKAST SODIUM 10 MG: 10 TABLET, FILM COATED ORAL at 08:06

## 2018-01-01 RX ADMIN — Medication 0.5 MCG/KG/MIN: at 05:06

## 2018-01-01 RX ADMIN — VERAPAMIL HYDROCHLORIDE 120 MG: 120 TABLET, FILM COATED, EXTENDED RELEASE ORAL at 08:06

## 2018-01-01 RX ADMIN — ESMOLOL HYDROCHLORIDE 50 MCG/KG/MIN: 20 INJECTION INTRAVENOUS at 12:06

## 2018-01-01 RX ADMIN — DICLOFENAC: 10 GEL TOPICAL at 10:06

## 2018-01-01 RX ADMIN — GUAIFENESIN 1200 MG: 600 TABLET, EXTENDED RELEASE ORAL at 10:05

## 2018-01-01 RX ADMIN — FUROSEMIDE 27.5 MG/HR: 10 INJECTION, SOLUTION INTRAMUSCULAR; INTRAVENOUS at 11:06

## 2018-01-01 RX ADMIN — SODIUM CHLORIDE 372 MG: 9 INJECTION, SOLUTION INTRAVENOUS at 11:05

## 2018-01-01 RX ADMIN — HYPROMELLOSE 2910 1 DROP: 5 SOLUTION OPHTHALMIC at 08:05

## 2018-01-01 RX ADMIN — GUAIFENESIN 400 MG: 200 SOLUTION ORAL at 09:05

## 2018-01-01 RX ADMIN — PRAVASTATIN SODIUM 80 MG: 40 TABLET ORAL at 08:06

## 2018-01-01 RX ADMIN — GUAIFENESIN 400 MG: 200 SOLUTION ORAL at 05:06

## 2018-01-01 RX ADMIN — PHENYLEPHRINE HYDROCHLORIDE 40 MG/ML: 10 INJECTION INTRAVENOUS at 04:06

## 2018-01-01 RX ADMIN — PHENYLEPHRINE HYDROCHLORIDE 40 MG: 10 INJECTION INTRAVENOUS at 09:06

## 2018-01-01 RX ADMIN — ESCITALOPRAM 5 MG: 5 TABLET, FILM COATED ORAL at 08:06

## 2018-01-01 RX ADMIN — FENTANYL CITRATE 25 MCG: 50 INJECTION, SOLUTION INTRAMUSCULAR; INTRAVENOUS at 06:06

## 2018-01-01 RX ADMIN — SODIUM CHLORIDE 372 MG: 9 INJECTION, SOLUTION INTRAVENOUS at 02:05

## 2018-01-01 RX ADMIN — FUROSEMIDE 27.5 MG/HR: 10 INJECTION, SOLUTION INTRAMUSCULAR; INTRAVENOUS at 06:06

## 2018-01-01 RX ADMIN — ISAVUCONAZONIUM SULFATE 372 MG: 186 CAPSULE ORAL at 10:06

## 2018-01-01 RX ADMIN — HEPARIN SODIUM 17 UNITS/KG/HR: 10000 INJECTION, SOLUTION INTRAVENOUS at 06:05

## 2018-01-01 RX ADMIN — PROPOFOL 25 MCG/KG/MIN: 10 INJECTION, EMULSION INTRAVENOUS at 09:06

## 2018-01-01 RX ADMIN — MONTELUKAST SODIUM 10 MG: 10 TABLET, FILM COATED ORAL at 08:05

## 2018-01-01 RX ADMIN — SODIUM CHLORIDE SOLN NEBU 3% 4 ML: 3 NEBU SOLN at 09:05

## 2018-01-01 RX ADMIN — FUROSEMIDE 60 MG: 10 INJECTION, SOLUTION INTRAMUSCULAR; INTRAVENOUS at 08:05

## 2018-01-01 RX ADMIN — LIDOCAINE 1 PATCH: 50 PATCH CUTANEOUS at 09:06

## 2018-01-01 RX ADMIN — POTASSIUM CHLORIDE 40 MEQ: 20 SOLUTION ORAL at 01:05

## 2018-01-01 RX ADMIN — VANCOMYCIN HYDROCHLORIDE 750 MG: 750 INJECTION, POWDER, LYOPHILIZED, FOR SOLUTION INTRAVENOUS at 01:06

## 2018-01-01 RX ADMIN — DICLOFENAC: 10 GEL TOPICAL at 09:05

## 2018-01-01 RX ADMIN — PROPOFOL 5 MCG/KG/MIN: 10 INJECTION, EMULSION INTRAVENOUS at 04:05

## 2018-01-01 RX ADMIN — POTASSIUM CHLORIDE 40 MEQ: 400 INJECTION, SOLUTION INTRAVENOUS at 07:06

## 2018-01-01 RX ADMIN — Medication 0.12 MCG/KG/MIN: at 04:05

## 2018-01-01 RX ADMIN — LABETALOL HYDROCHLORIDE 10 MG: 5 INJECTION, SOLUTION INTRAVENOUS at 05:05

## 2018-01-01 RX ADMIN — IPRATROPIUM BROMIDE AND ALBUTEROL SULFATE 3 ML: .5; 3 SOLUTION RESPIRATORY (INHALATION) at 05:06

## 2018-01-01 RX ADMIN — PIPERACILLIN AND TAZOBACTAM 4.5 G: 4; .5 INJECTION, POWDER, LYOPHILIZED, FOR SOLUTION INTRAVENOUS; PARENTERAL at 03:05

## 2018-01-01 RX ADMIN — POLYETHYLENE GLYCOL 3350 17 G: 17 POWDER, FOR SOLUTION ORAL at 10:05

## 2018-01-01 RX ADMIN — VANCOMYCIN HYDROCHLORIDE 750 MG: 750 INJECTION, POWDER, LYOPHILIZED, FOR SOLUTION INTRAVENOUS at 02:06

## 2018-01-01 RX ADMIN — SUCCINYLCHOLINE CHLORIDE 80 MG: 20 INJECTION INTRAMUSCULAR; INTRAVENOUS at 03:06

## 2018-01-01 RX ADMIN — SODIUM PHOSPHATE, MONOBASIC, MONOHYDRATE 15 MMOL: 276; 142 INJECTION, SOLUTION INTRAVENOUS at 06:06

## 2018-01-01 RX ADMIN — PIPERACILLIN AND TAZOBACTAM 4.5 G: 4; .5 INJECTION, POWDER, LYOPHILIZED, FOR SOLUTION INTRAVENOUS; PARENTERAL at 01:05

## 2018-01-01 RX ADMIN — PHENYLEPHRINE HYDROCHLORIDE 5 MCG/KG/MIN: 10 INJECTION INTRAVENOUS at 11:06

## 2018-01-01 RX ADMIN — MAGNESIUM SULFATE IN WATER 2 G: 40 INJECTION, SOLUTION INTRAVENOUS at 04:06

## 2018-01-01 RX ADMIN — METHYLPREDNISOLONE SODIUM SUCCINATE 80 MG: 40 INJECTION, POWDER, FOR SOLUTION INTRAMUSCULAR; INTRAVENOUS at 10:06

## 2018-01-01 RX ADMIN — ALUMINUM HYDROXIDE, MAGNESIUM HYDROXIDE, AND SIMETHICONE 30 ML: 200; 200; 20 SUSPENSION ORAL at 11:06

## 2018-01-01 RX ADMIN — HYPROMELLOSE 2910 1 DROP: 5 SOLUTION OPHTHALMIC at 10:06

## 2018-01-01 RX ADMIN — MICONAZOLE NITRATE: 20 OINTMENT TOPICAL at 11:06

## 2018-01-01 RX ADMIN — PROPOFOL 50 MCG/KG/MIN: 10 INJECTION, EMULSION INTRAVENOUS at 06:06

## 2018-01-01 RX ADMIN — DAPSONE 100 MG: 100 TABLET ORAL at 08:06

## 2018-01-01 RX ADMIN — SODIUM PHOSPHATE, MONOBASIC, MONOHYDRATE 30 MMOL: 276; 142 INJECTION, SOLUTION INTRAVENOUS at 04:06

## 2018-01-01 RX ADMIN — ISAVUCONAZONIUM SULFATE 372 MG: 186 CAPSULE ORAL at 07:05

## 2018-01-01 RX ADMIN — HYPROMELLOSE 2910 1 DROP: 5 SOLUTION OPHTHALMIC at 04:06

## 2018-01-01 RX ADMIN — PRAVASTATIN SODIUM 80 MG: 40 TABLET ORAL at 08:05

## 2018-01-01 RX ADMIN — ESCITALOPRAM 20 MG: 20 TABLET, FILM COATED ORAL at 10:05

## 2018-01-01 RX ADMIN — PROPOFOL 200 MG: 10 INJECTION, EMULSION INTRAVENOUS at 07:05

## 2018-01-01 RX ADMIN — PANTOPRAZOLE SODIUM 40 MG: 40 TABLET, DELAYED RELEASE ORAL at 09:05

## 2018-01-01 RX ADMIN — POLYETHYLENE GLYCOL 3350 17 G: 17 POWDER, FOR SOLUTION ORAL at 04:05

## 2018-01-01 RX ADMIN — SODIUM CHLORIDE SOLN NEBU 3% 4 ML: 3 NEBU SOLN at 02:06

## 2018-01-01 RX ADMIN — IPRATROPIUM BROMIDE AND ALBUTEROL SULFATE 3 ML: .5; 3 SOLUTION RESPIRATORY (INHALATION) at 02:05

## 2018-01-01 RX ADMIN — FUROSEMIDE 60 MG: 10 INJECTION, SOLUTION INTRAMUSCULAR; INTRAVENOUS at 10:05

## 2018-01-01 RX ADMIN — VANCOMYCIN HYDROCHLORIDE 1500 MG: 10 INJECTION, POWDER, LYOPHILIZED, FOR SOLUTION INTRAVENOUS at 02:05

## 2018-01-01 RX ADMIN — SODIUM CHLORIDE 500 ML: 9 INJECTION, SOLUTION INTRAVENOUS at 06:06

## 2018-01-01 RX ADMIN — FUROSEMIDE 29.5 MG/HR: 10 INJECTION, SOLUTION INTRAMUSCULAR; INTRAVENOUS at 08:06

## 2018-01-01 RX ADMIN — ESCITALOPRAM OXALATE 10 MG: 10 TABLET ORAL at 08:05

## 2018-01-01 RX ADMIN — POTASSIUM CHLORIDE 40 MEQ: 400 INJECTION, SOLUTION INTRAVENOUS at 06:05

## 2018-01-01 RX ADMIN — Medication 25 MCG/HR: at 10:05

## 2018-01-01 RX ADMIN — PROPOFOL 50 MCG/KG/MIN: 10 INJECTION, EMULSION INTRAVENOUS at 02:06

## 2018-01-01 RX ADMIN — TIOTROPIUM BROMIDE 18 MCG: 18 CAPSULE ORAL; RESPIRATORY (INHALATION) at 09:05

## 2018-01-01 RX ADMIN — ALPRAZOLAM 0.5 MG: 0.5 TABLET ORAL at 02:05

## 2018-01-01 RX ADMIN — SODIUM CHLORIDE 372 MG: 9 INJECTION, SOLUTION INTRAVENOUS at 10:05

## 2018-01-01 RX ADMIN — GUAIFENESIN 400 MG: 200 SOLUTION ORAL at 10:06

## 2018-01-01 RX ADMIN — LABETALOL HYDROCHLORIDE 10 MG: 5 INJECTION, SOLUTION INTRAVENOUS at 01:05

## 2018-01-01 RX ADMIN — MORPHINE SULFATE 0.5 MG/HR: 10 INJECTION INTRAVENOUS at 06:06

## 2018-01-01 RX ADMIN — BACLOFEN 5 MG: 10 TABLET ORAL at 03:06

## 2018-01-01 RX ADMIN — SUCCINYLCHOLINE CHLORIDE 140 MG: 20 INJECTION, SOLUTION INTRAMUSCULAR; INTRAVENOUS; PARENTERAL at 08:06

## 2018-01-01 RX ADMIN — POTASSIUM BICARBONATE 50 MEQ: 25 TABLET, EFFERVESCENT ORAL at 09:06

## 2018-01-01 RX ADMIN — LABETALOL HYDROCHLORIDE 10 MG: 5 INJECTION, SOLUTION INTRAVENOUS at 12:06

## 2018-01-01 RX ADMIN — Medication 0.5 MCG/KG/MIN: at 10:06

## 2018-01-01 RX ADMIN — SODIUM CHLORIDE SOLN NEBU 3% 4 ML: 3 NEBU SOLN at 02:05

## 2018-01-01 RX ADMIN — DAPSONE 100 MG: 100 TABLET ORAL at 08:05

## 2018-01-01 RX ADMIN — VASOPRESSIN 0.04 UNITS/MIN: 20 INJECTION INTRAVENOUS at 01:06

## 2018-01-01 RX ADMIN — IOHEXOL 75 ML: 350 INJECTION, SOLUTION INTRAVENOUS at 01:05

## 2018-01-01 RX ADMIN — MAGNESIUM SULFATE IN WATER 2 G: 40 INJECTION, SOLUTION INTRAVENOUS at 05:05

## 2018-01-01 RX ADMIN — ENOXAPARIN SODIUM 40 MG: 100 INJECTION SUBCUTANEOUS at 05:06

## 2018-01-01 RX ADMIN — GUAIFENESIN 400 MG: 200 SOLUTION ORAL at 01:06

## 2018-01-01 RX ADMIN — PROPOFOL 40 MCG/KG/MIN: 10 INJECTION, EMULSION INTRAVENOUS at 06:06

## 2018-01-01 RX ADMIN — MORPHINE SULFATE 2 MG: 2 INJECTION, SOLUTION INTRAMUSCULAR; INTRAVENOUS at 08:06

## 2018-01-01 RX ADMIN — LIDOCAINE HYDROCHLORIDE 1 ML: 10 INJECTION, SOLUTION INFILTRATION; PERINEURAL at 04:05

## 2018-01-01 RX ADMIN — FAMOTIDINE 20 MG: 10 INJECTION INTRAVENOUS at 09:06

## 2018-01-01 RX ADMIN — Medication 0.04 MCG/KG/MIN: at 06:06

## 2018-01-01 RX ADMIN — ACETAMINOPHEN 650 MG: 325 TABLET ORAL at 08:06

## 2018-01-01 RX ADMIN — FUROSEMIDE 5 MG/HR: 10 INJECTION, SOLUTION INTRAMUSCULAR; INTRAVENOUS at 08:06

## 2018-01-01 RX ADMIN — METHYLPREDNISOLONE SODIUM SUCCINATE 60 MG: 40 INJECTION, POWDER, FOR SOLUTION INTRAMUSCULAR; INTRAVENOUS at 10:05

## 2018-01-01 RX ADMIN — SODIUM CHLORIDE 372 MG: 9 INJECTION, SOLUTION INTRAVENOUS at 12:06

## 2018-01-01 RX ADMIN — SODIUM CHLORIDE 500 ML: 9 INJECTION, SOLUTION INTRAVENOUS at 12:06

## 2018-01-01 RX ADMIN — POTASSIUM CHLORIDE 20 MEQ: 200 INJECTION, SOLUTION INTRAVENOUS at 08:05

## 2018-01-01 RX ADMIN — FAMOTIDINE 20 MG: 20 TABLET ORAL at 10:06

## 2018-01-01 RX ADMIN — MEROPENEM 2 G: 1 INJECTION, POWDER, FOR SOLUTION INTRAVENOUS at 03:05

## 2018-04-18 PROBLEM — D58.0 HEREDITARY SPHEROCYTOSIS: Status: ACTIVE | Noted: 2018-01-01

## 2018-04-18 PROBLEM — Z86.711 HISTORY OF PULMONARY EMBOLISM: Status: ACTIVE | Noted: 2018-01-01

## 2018-04-18 PROBLEM — Z86.718 HISTORY OF DVT OF LOWER EXTREMITY: Status: ACTIVE | Noted: 2018-01-01

## 2018-05-25 PROBLEM — J96.90 RESPIRATORY FAILURE: Status: ACTIVE | Noted: 2018-01-01

## 2018-05-25 NOTE — LETTER
June 11, 2018                     [address]  [facility] [address]  Phone: [phone]  Fax: [phone]   June 11, 2018     Patient: Kamla Coulter   YOB: 1960   Date of Visit: 5/25/2018       To Whom it May Concern:    Momo Kemi has been present at bedside with is mother, Kamla Coulter, while she has been in the hospital until she passed away.  Please excuse him from work from 6/8/18- 6/13/18.  He may return to work on 6/14/18.     If you have any questions or concerns, please don't hesitate to call.    Sincerely,         Ana Villalobos MD

## 2018-05-26 PROBLEM — R78.81 MRSA BACTEREMIA: Status: ACTIVE | Noted: 2018-01-01

## 2018-05-26 PROBLEM — J85.3 MEDIASTINAL ABSCESS: Status: ACTIVE | Noted: 2018-01-01

## 2018-05-26 PROBLEM — B95.62 MRSA BACTEREMIA: Status: ACTIVE | Noted: 2018-01-01

## 2018-05-26 PROBLEM — J96.01 ACUTE HYPOXEMIC RESPIRATORY FAILURE: Status: ACTIVE | Noted: 2018-01-01

## 2018-05-26 PROBLEM — B49 FUNGEMIA: Status: ACTIVE | Noted: 2018-01-01

## 2018-05-26 NOTE — PLAN OF CARE
Problem: Patient Care Overview  Goal: Plan of Care Review  Outcome: Ongoing (interventions implemented as appropriate)  No acute events throughout day. See vital signs and assessments in flowsheets. See below for updates on today's progress.     Pulmonary: On Bipap with a setting initially at 65% of Fi02 currently at 90%. I:E currently at 16/8. Saturating now at 99%. She has been noted with desaturation as low as 70% (BiPAp setting was changed/placed on heparin drip t/c pulmonary clot)    Cardiovascular: Sinus on HR of 80-90s. BP ranges at 120-160/70-90. Pulses palpable on radial/brachial, good doppler flow on dorsales pedis and post tibials.     Neurological: AAOx4, obeys command and with moderate weakness on UE and severe weakness on LE    Gastrointestinal: On NGT-NPO residuals are about 5ml brownish green in color    Genitourinary: On foleys drained about 1,100ml of clear yellow urine  (60mg lasix IV given since admitted)    Endocrine: Blood sugar at 137 and 180 mg/dl    Integumentary/Other: -PICC line double lumen on Right and PIV on left AC g20 -Noted a lot of skin redness all over chest area, neck, and upper extremities (family said that it has been ongoing about month d/t Lovenox she was receiving) -Noted skin tears beneath breast areas and on right arm covered which has been with foam dressing upon admission, has right sacral area pressure sore, to be reassessed further, it has a foam dressing as well upon admission. A foam dressing is in place on middle sacral area but with no noted skin breakdown.    Infusions: -Heparin drip     Patient progressing towards goals as tolerated, plan of care communicated and reviewed with Kamla Coulter and family. All concerns addressed. Will continue to monitor.

## 2018-05-26 NOTE — SUBJECTIVE & OBJECTIVE
"Past Medical History:   Diagnosis Date    ABPA (allergic bronchopulmonary aspergillosis)     Allergy     Anxiety     Arachnoid cyst     monitoring    Asthma     COPD (chronic obstructive pulmonary disease)     Hemoptysis     Hypertension     Tachycardia     Thalamic disease     thalamia minor       Past Surgical History:   Procedure Laterality Date    BREAST SURGERY      breast bx     [place]     SECTION      CHOLECYSTECTOMY      COLONOSCOPY N/A 2017    Procedure: COLONOSCOPY;  Surgeon: Horacio Pelaez MD;  Location: [facility];  Service: General;  Laterality: N/A;    DENTAL SURGERY      GALLBLADDER SURGERY      HERNIA REPAIR      INTRAUTERINE DEVICE INSERTION      SINUS SURGERY         Review of patient's allergies indicates:   Allergen Reactions    Neosporin (neomycin-polymyx) Rash     "I get a skin rash"    Sulfa (sulfonamide antibiotics) Anaphylaxis    Bactrim [sulfamethoxazole-trimethoprim]     Venlafaxine analogues     Pollen extracts Other (See Comments)     "allergic rhinitis mess"    Vfend [voriconazole] Other (See Comments)     Vision loss       Family History     Problem Relation (Age of Onset)    Atrial fibrillation Mother    Diabetes Mother    Heart disease Father    Hyperlipidemia Mother, Father    Hypertension Mother, Father, Sister        Social History Main Topics    Smoking status: Never Smoker    Smokeless tobacco: Never Used    Alcohol use No      Comment: social occ    Drug use: No    Sexual activity: Yes     Partners: Male      Review of Systems   Unable to obtain given patient's factors.    Objective:     Vital Signs (Most Recent):  Temp: 98.6 °F (37 °C) (18 2300)  Pulse: 83 (18 0018)  Resp: 20 (18 0018)  BP: 138/79 (18 0000)  SpO2: 98 % (18 0018) Vital Signs (24h Range):  Temp:  [97.4 °F (36.3 °C)-98.6 °F (37 °C)] 98.6 °F (37 °C)  Pulse:  [] 83  Resp:  [19-23] 20  SpO2:  [90 %-98 %] 98 %  BP: " (117-146)/(64-89) 138/79   Weight: 81.4 kg (179 lb 7.3 oz)  Body mass index is 32.82 kg/m².    No intake or output data in the 24 hours ending 05/26/18 0152    Physical Exam   General: obese lady, who appears ill, BiPAP mask on, alert and oriented and follows commands appropriately.   Eyes: conjunctivae/corneas clear. PERRL. EOMI.   Neck: supple, symmetrical, trachea midline, no JVD.  HENT: NG tube in place.    Cardiovascular: Heart: regular rhythm, S1, S2 normal, no murmur, click, rub or gallop.  Lungs: coarse rhonchi, inspiratory crackles, and expiratory wheeze present.   Chest Wall: no tenderness  Abdomen/Rectal: Abdomen: Non distended. No BS. No masses. No TTP.   Extremities: 2+ pitting edema up to the knees, no redness or tenderness in the calves or thighs. Pulses: 2+ and symmetric  Skin: Skin color, texture, turgor normal. No rashes or lesions  Musculoskeletal: full range of motion of joints.   Lymph Nodes: No cervical or supraclavicular adenopathy  Psych/Behavioral: Normal. Alert and oriented.      Vents:  Oxygen Concentration (%): 80 (05/26/18 0018)  Lines/Drains/Airways     Peripherally Inserted Central Catheter Line                 PICC Double Lumen 05/24/18 left brachial 2 days          Drain                 Urethral Catheter 05/24/18 16 Fr. 2 days              Significant Labs:    CBC/Anemia Profile:    Recent Labs  Lab 05/25/18  2154   WBC 10.06   HGB 8.8*   HCT 27.2*      MCV 94   RDW 17.0*        Chemistries:    Recent Labs  Lab 05/25/18  2154      K 3.3*   CL 94*   CO2 36*   BUN 11   CREATININE 0.6   CALCIUM 7.9*   ALBUMIN 2.1*   PROT 4.8*   BILITOT 0.8   ALKPHOS 515*   *   *   MG 2.0   PHOS 3.0

## 2018-05-26 NOTE — ASSESSMENT & PLAN NOTE
Nutrition Problem  increased nutrient needs    Related to (etiology):   Physiological needs 2/2 Acute Res Failure. NPO    Signs and Symptoms (as evidenced by):   Consult for TF rec. Currently no form of nutrition being administered     Interventions/Recommendations (treatment strategy):  See recs above    Nutrition Diagnosis Status:   New

## 2018-05-26 NOTE — ASSESSMENT & PLAN NOTE
-- likely multifactorial with asthma exacerbation, HAP, and volume overload all likely to be contributing. Ddx of PE considered less likely in setting of recent CTA on 5/8/2018 being negative and CT chest with contrast on 5/25/2018 with grossly normal pulmonary vasculature in addition to patient being on appropriate dvt ppx make the dx of PE less likely.  -- continue broad spectrum abx with vancomycin and meropenem covering for Achromobacter xylogens in setting of HAP. Continue Solumedrol 60mg bid and consider increasing frequency.  -- duonebs c2nsveed.   -- holding lasix with increasing BUN and alkalosis with HCO36 of 40.   -- continue close monitoring

## 2018-05-26 NOTE — ASSESSMENT & PLAN NOTE
-- likely due to being on steroids chronically.   -- a1c in the am and start LDSSI.   -- initiate TF once patient is more stable.

## 2018-05-26 NOTE — HPI
Case of 56 y/o female with PMHx athma on steroids and Xolair c/w ABPA and bronchiectasis. Transferred from [facility]. Initially presented on 5/2/18 with c/c SOB, low grade fevers. Course complicated with MRSA bacteremia/pneumonia and mediastinal collection positive for Achromobacter sp and MRSA. SHEILA done at OSH negative for IE. Recently extubated on 5/25/18 to BiPAP and transferred to [facility] for higher level of care ID consulted for antibiotic recommendations. Patient had previosuly being followed by Dr Loco for ABPA had discontinued voriconazole 2ry to vision loss that had not returned to baseline.     She has been maintained on IV vanc for a total of 6 weeks which should be the end of June     Current ID question is concern of increasing LFTs which may be due to Cresemba and consult is for potential option.  Patient has been in itraconazole at [facility] about 1 year ago and it was felt that it was not helping so it was changed to voriconazole.  Voriconazole cause eye issues and was stopped by [facility] ID consult in 5/18 and her eye issues resolved and did well until the las few days when it was noted that LFT came down to 40-60 but now increased to the mid 100's.  Patient is not DNR and is having severe SOB and history was provided by the very cooperative family

## 2018-05-26 NOTE — ASSESSMENT & PLAN NOTE
Patient received treatment with voriconazole followed by Dr Higuera. Voriconazole was discontinued due to vision loss that had not been recovered as per note from 4/18/18. Concerned starting voriconazole in this setting will do more harm than benefit. Recommend to D/C and start isavuconazole as alternative. Will need to contact clinical micro lab from OSH to confirm culture result and origin.     Recommendations   - D/C voriconazole   - start isavuconazole

## 2018-05-26 NOTE — ASSESSMENT & PLAN NOTE
Bilateral pleural effusion     -- ill-defined mediastinal collection with mediastinal abscess, loculated pleural effusion, and complex pleural effusion are among the possible differentials.  -- GS and culture from EBUS at OSH grew MRSA + Achromobacter.   -- continue broad spectrum abx including vancomycin and Meropenem.    -- consider CTS consultation.

## 2018-05-26 NOTE — ASSESSMENT & PLAN NOTE
-- likely multifactorial with asthma exacerbation, HAP, and volume overload all likely to be contributing. A ddx of PE is still likely in setting of recent CTA on 5/8/2018 being negative and CT chest with contrast on 5/25/2018 with grossly normal pulmonary vasculature in addition to patient being on appropriate dvt ppx make the dx of PE less likely.  -- continue broad spectrum abx with vancomycin (resp cx grew MRSA) and Zosyn. Continue Solumedrol 80mg bid and consider increasing frequency.  -- duonebs [id number].   -- lasix 60mg IV once and redose daily as needed.   -- continue close monitoring and need for intubation.

## 2018-05-26 NOTE — PROGRESS NOTES
" [facility]  Adult Nutrition  Progress Note    SUMMARY       Recommendations    Recommendation/Intervention:   1.Initiate TF Impact Peptide @40ml/hr +150ml water flushes q4hrs; Provides 1440kcal, 90g/Pro, 1339ml free fl. flushes per MD.   2.Meets 98% EEN & 100%EPN. Start w 10ml/hr and increase by 10 ml/hr q2hr as tolerated until goal rate achieved.   3.Hold for residuals > 300ml  Goals: tolerate TF at Goal rate  Nutrition Goal Status: new  Communication of RD Recs: reviewed with RN    Reason for Assessment    Reason for Assessment: consult  Diagnosis:  (Acute hypoxemic respiratory failure )  Relevant Medical History: COPD, HTN  General Information Comments: pt w/ Bipap mask and NGT access. Fm states she was on Pulmocare @ 40 prior to transfer.     Nutrition Risk Screen    Nutrition Risk Screen: tube feeding or parenteral nutrition    Nutrition/Diet History    Patient Reported Diet/Restrictions/Preferences: general  Typical Food/Fluid Intake: adequate PTA  Food Preferences: none  Do you have any cultural, spiritual, Quaker conflicts, given your current situation?: none  Food Allergies: NKFA  Factors Affecting Nutritional Intake: NPO, impaired cognitive status/motor control    Anthropometrics    Temp: 97.8 °F (36.6 °C)  Height Method: Stated  Height: 5' 2" (157.5 cm)  Height (inches): 62 in  Weight Method: Bed Scale  Weight: 81.4 kg (179 lb 7.3 oz)  Weight (lb): 179.46 lb  Ideal Body Weight (IBW), Female: 110 lb  % Ideal Body Weight, Female (lb): 163.15 lb  BMI (Calculated): 32.9  BMI Grade: 30 - 34.9- obesity - grade I       Lab/Procedures/Meds    Pertinent Labs Reviewed: reviewed  Pertinent Labs Comments: K-3.4, Glu-214, Ca-7.6, Alb-2.2, Alk Phos-500, AST-255, ALT-758  Pertinent Medications Reviewed: reviewed  Pertinent Medications Comments: Lasix, heparin, insulin, methylprednisolone, pantoprazole, statin, Vanc, Vori    Physical Findings/Assessment    Overall Physical Appearance: obese, on " ventilator support  Tubes: nasogastric tube  Oral/Mouth Cavity: WDL  Skin: pressure ulcer(s)    Estimated/Assessed Needs    Weight Used For Calorie Calculations: 81.4 kg (179 lb 7.3 oz)  Energy Calorie Requirements (kcal): 1470kcal/day  Energy Need Method: [facility]  Protein Requirements:  (1.2-2.0x IBW (BMI= 30-39.9) )  Weight Used For Protein Calculations: 50.1 kg (110 lb 7.2 oz)  Fluid Requirements (mL): per MD  Fluid Need Method: RDA Method  RDA Method (mL): 1470  CHO Requirement: 45-50% total intake      Nutrition Prescription Ordered    Current Diet Order: NPO  Nutrition Order Comments: TF Recs placed    Evaluation of Received Nutrient/Fluid Intake    IV Fluid (mL): 1050  I/O: -1.3L since admit    Intake/Output Summary (Last 24 hours) at 05/26/18 1251  Last data filed at 05/26/18 1100   Gross per 24 hour   Intake           150.28 ml   Output             1890 ml   Net         -1739.72 ml       Energy Calories Required: not meeting needs  Protein Required: not meeting needs  Fluid Required: not meeting needs  Comments: LBM: none noted  % Intake of Estimated Energy Needs: 0 - 25 %  % Meal Intake: NPO    Nutrition Risk    Level of Risk/Frequency of Follow-up: high     Assessment and Plan    * Acute hypoxemic respiratory failure    Nutrition Problem  increased nutrient needs    Related to (etiology):   Physiological needs 2/2 Acute Res Failure. NPO    Signs and Symptoms (as evidenced by):   Consult for TF rec. Currently no form of nutrition being administered     Interventions/Recommendations (treatment strategy):  See recs above    Nutrition Diagnosis Status:   New                 Monitor and Evaluation    Food and Nutrient Intake: enteral nutrition intake, energy intake  Food and Nutrient Administration: diet order, enteral and parenteral nutrition administration  Physical Activity and Function: nutrition-related ADLs and IADLs  Anthropometric Measurements: weight change, weight  Biochemical Data, Medical  Tests and Procedures:  (All labs)  Nutrition-Focused Physical Findings: overall appearance, skin     Nutrition Follow-Up    RD Follow-up?: Yes

## 2018-05-26 NOTE — NURSING
Critical care has been notified that patient Heparin drip has been off for over an hour and that Nomogram needs to be added to medication order, will continue to monitor

## 2018-05-26 NOTE — ASSESSMENT & PLAN NOTE
-- MRSA grew initially in urine, then blood, and finally in mediastinal collection.   -- SHEILA performed 5/8/2018 was negative for endocarditis.  -- hx of prolonged immunosuppression on Prednisone and Xolair predisposing patient to fungal infection. Patient with recent hx of ABPA completed course of voriconazole and steroid taper.   -- continue IV Vancomycin. Repeat cultures here NGTD. Last sterile blood culture per OSH was 5/18/2018. Will plan for 2 weeks course of IV abx following this date unless repeat blood cultures here are positive. Repeat blood cultures here with NGTD.    -- s/p voriconazole and steroid taper end of 2017.   -- switch Zosyn to meropenem for 7 day covering for Achromobacter xylogens.   -- ID following, appreciate their assistance.

## 2018-05-26 NOTE — ASSESSMENT & PLAN NOTE
-- fungal + sputum infection in blood reported at OSH   -- ID switched voriconazole to isavuconazonium.

## 2018-05-26 NOTE — H&P
[facility]  Critical Care Medicine  History & Physical    Patient Name: Kamla Coulter  MRN: [id number]  Admission Date: 5/25/2018  Hospital Length of Stay: 1 days  Code Status: Full Code  Attending Physician: Yuval Anderson MD   Primary Care Provider: Molina Alexandre MD   Principal Problem:  Acute hypoxemic respiratory failure    Subjective:     HPI:    This is a 57 year old female with hx of severe persistent asthma on Xolair, prolonged immunosuppression on steroids, ABPA,bronchiectasis, HTN, DVT, and GERD who is transferred from [facility] for higher level of care. She initially presented to the OSH on May second for progressive shortness of breath, wheezing and orthopnea over a period of 2-3 days associated with low-grade fever. She was admitted to the hospital medicine floor and started on IV solumedrol 40mg bid and was placed on BiPAP. She was weaned off to NC on the second day of admission. Blood cultures from admission grew MRSA bacteremia and patient was started on vancomycin. Urine and sputum cultures both grew MRSA. The patient had persistent MRSA bacteremia despite being on vancomycin and a SHEILA was done on 5/8/2018 that was negative for endocarditis and revealed normal EF%. A bone scan on 5/10/2018 was similarly negative for an infection nidus. On 5/10/2018 the patient developed an acute hypoxic respiratory failure that did not improve promptly on BiPAP with an FiO2 of 100% and the patient was moved to the ICU and started empirically on therapeutic Lovenox. A CTA done on the same day revealed no PE but was notable for worsening bilateral pleural effusion and a non-specific infiltrate/collection around the mid-esophagus. The collection was thought to be a loculated pleural effusion. IV solumedrol was increased to 80mg twice daily and Lovenox was decreased to ppx dosing. By 5/12/2018 the patient's respiratory failure had improved and patient was weaned off  of BiPAP to nasal cannula. Repeat blood cultures collected on 5/13/2018 showed mycotic growth with budding yeast and the patient was started on voriconazole. On 5/15/2018 the patient had worsening hypoxia and increased work of breathing and a CXR showed HAP and Ceftaroline was added to vancomycin. On 5/16/2018, repeat blood cultures showed persistent MRSA bacteremia and an Indium scan was performed on 5/18/2018 which revealed significant uptake by a collection located adjacent to the mid-esophagus. The patient was electively intubated on 5/22/2018 and an EGD revealed a whitish plaque at the proximal esophagus that was biopsied and resulted negative for malignant changes. Similarly, on the same day, an EBUS was performed and showed fluid collection posterior to the distal trachea that was better assessed through 5 FNA passes. Cultures from the EBUS grew MRSA + Alcaligenes Xylosoxidans. Furthermore, FNA was negative for any malignancies. The patient was extubated on the same day. Two days later (5/24/2018), the patient developed an acute decompensated respiratory failure with oxygen saturations dropping to the 60's and the patient was emergently intubated. CXR showed left lower lobe atelectasis. An emergent bronchoscopy revealed a mucus plug in the left mainstem bronchus. The mucus plug was brown, thick, and difficult to suction raising suspicion for Aspergillus infection. Following mucus plug disimpaction, the patient's respiratory status recovered immediately and the patient was extubated the next morning (5/25/2018). Prior to that, a repeat CT chest showed persistent collection around the mid-esophagus with thickened pleura measuring 6.52cm. At this point, it was decided that the patient was better served at a higher level of care facility and the patient was transferred to [facility]. The patient arrived on BiPAP 10/5 and 45% FiO2 with good oxygen saturation.        Past Medical History:   Diagnosis Date    ABPA  "(allergic bronchopulmonary aspergillosis)     Allergy     Anxiety     Arachnoid cyst     monitoring    Asthma     COPD (chronic obstructive pulmonary disease)     Hemoptysis     Hypertension     Tachycardia     Thalamic disease     thalamia minor       Past Surgical History:   Procedure Laterality Date    BREAST SURGERY      breast bx     BRONCHOSCOPY  10/2016    [place]     SECTION      CHOLECYSTECTOMY      COLONOSCOPY N/A 2017    Procedure: COLONOSCOPY;  Surgeon: Horacio Pelaez MD;  Location: [facility];  Service: General;  Laterality: N/A;    DENTAL SURGERY      GALLBLADDER SURGERY      HERNIA REPAIR      INTRAUTERINE DEVICE INSERTION      SINUS SURGERY         Review of patient's allergies indicates:   Allergen Reactions    Neosporin (neomycin-polymyx) Rash     "I get a skin rash"    Sulfa (sulfonamide antibiotics) Anaphylaxis    Bactrim [sulfamethoxazole-trimethoprim]     Venlafaxine analogues     Pollen extracts Other (See Comments)     "allergic rhinitis mess"    Vfend [voriconazole] Other (See Comments)     Vision loss       Family History     Problem Relation (Age of Onset)    Atrial fibrillation Mother    Diabetes Mother    Heart disease Father    Hyperlipidemia Mother, Father    Hypertension Mother, Father, Sister        Social History Main Topics    Smoking status: Never Smoker    Smokeless tobacco: Never Used    Alcohol use No      Comment: social occ    Drug use: No    Sexual activity: Yes     Partners: Male      Review of Systems   Unable to obtain given patient's factors.    Objective:     Vital Signs (Most Recent):  Temp: 98.6 °F (37 °C) (18 2300)  Pulse: 83 (18 0018)  Resp: 20 (18 0018)  BP: 138/79 (18 0000)  SpO2: 98 % (18 0018) Vital Signs (24h Range):  Temp:  [97.4 °F (36.3 °C)-98.6 °F (37 °C)] 98.6 °F (37 °C)  Pulse:  [] 83  Resp:  [19-23] 20  SpO2:  [90 %-98 %] 98 %  BP: (117-146)/(64-89) 138/79   Weight: 81.4 kg (179 " lb 7.3 oz)  Body mass index is 32.82 kg/m².    No intake or output data in the 24 hours ending 05/26/18 0152    Physical Exam   General: obese lady, who appears ill, BiPAP mask on, alert and oriented and follows commands appropriately.   Eyes: conjunctivae/corneas clear. PERRL. EOMI.   Neck: supple, symmetrical, trachea midline, no JVD.  HENT: NG tube in place.    Cardiovascular: Heart: regular rhythm, S1, S2 normal, no murmur, click, rub or gallop.  Lungs: coarse rhonchi, inspiratory crackles, and expiratory wheeze present.   Chest Wall: no tenderness  Abdomen/Rectal: Abdomen: Non distended. No BS. No masses. No TTP.   Extremities: 2+ pitting edema up to the knees, no redness or tenderness in the calves or thighs. Pulses: 2+ and symmetric  Skin: Skin color, texture, turgor normal. No rashes or lesions  Musculoskeletal: full range of motion of joints.   Lymph Nodes: No cervical or supraclavicular adenopathy  Psych/Behavioral: Normal. Alert and oriented.      Vents:  Oxygen Concentration (%): 80 (05/26/18 0018)  Lines/Drains/Airways     Peripherally Inserted Central Catheter Line                 PICC Double Lumen 05/24/18 left brachial 2 days          Drain                 Urethral Catheter 05/24/18 16 Fr. 2 days              Significant Labs:    CBC/Anemia Profile:    Recent Labs  Lab 05/25/18  2154   WBC 10.06   HGB 8.8*   HCT 27.2*      MCV 94   RDW 17.0*        Chemistries:    Recent Labs  Lab 05/25/18  2154      K 3.3*   CL 94*   CO2 36*   BUN 11   CREATININE 0.6   CALCIUM 7.9*   ALBUMIN 2.1*   PROT 4.8*   BILITOT 0.8   ALKPHOS 515*   *   *   MG 2.0   PHOS 3.0               Assessment/Plan:     Pulmonary   Severe persistent asthma    -- on Xolair and prednisone taper at home.   -- mx as per acute hypoxemic respiratory failure.         Acute hypoxemic respiratory failure    -- likely multifactorial with asthma exacerbation, HAP, and volume overload all likely to be contributing. A ddx  of PE is still likely in setting of recent CTA on 5/8/2018 being negative and CT chest with contrast on 5/25/2018 with grossly normal pulmonary vasculature in addition to patient being on appropriate dvt ppx make the dx of PE less likely. Given acute decompensation overnight, empiric heparin gtt was initiated.   -- continue broad spectrum abx with vancomycin (resp cx grew MRSA) and Zosyn. Continue Solumedrol 80mg bid and consider increasing frequency.  -- duonebs w1pbahvy, tiotropium, Breo Ellipta, and Singulair.  -- lasix 60mg IV once and redose daily as needed.   -- continue close monitoring and need for intubation. Currently on increased FiO2 of 90% with O2 sat's >95%.       Mediastinal collection/abscess  Bilateral pleural effusion    -- ill-defined mediastinal collection with mediastinal abscess, loculated pleural effusion, and complex pleural effusion are among the possible differentials.  -- GS and culture from EBUS at OSH grew MRSA + Alcaligenes Xylosoxidans.   -- continue broad spectrum abx including vancomycin,   -- consider CTS consultation.         Cardiac/Vascular   Mixed hyperlipidemia    -- continue home Pravachol        Essential hypertension    -- Currently normotensive.   -- no issues.       ID  MRSA bacteremia  Fungemia    -- MRSA grew initially in urine, then blood, and finally in mediastinal collection.   -- SHEILA performed 5/8/2018 was negative for endocarditis.  -- hx of prolonged immunosuppression on Prednisone and Xolair predisposing patient to fungal infection. Patient with recent hx of ABPA completed course of voriconazole and steroid taper.   -- continue IV Vancomycin. Repeat cultures here. Last sterile blood culture per OSH was 5/18/2018. Will plan for 2 weeks course of IV abx following this date unless repeat blood cultures here are positive. Repeat blood cultures here with NGTD.    s/p voriconazole and steroid taper end of 2017.   -- add Zosyn and continue Voriconazole.  -- ID consulted,  appreciate their recommendations.        ABPA (allergic bronchopulmonary aspergillosis)    -- s/p voriconazole and steroid taper end of 2017.         Hematology   History of DVT of lower extremity    -- hx of DVT in left lower extremity. Completed 3 month course of Eliquis late 2017.         Endocrine   Impaired glucose tolerance    -- likely due to being on steroids chronically.   -- a1c in the am and start LDSSI.   -- initiate TF through once patient is more stable.    -- consult nutrition      GI   GERD (gastroesophageal reflux disease)    -- resume ppi            Critical Care Daily Checklist:    A: Awake: RASS Goal/Actual Goal:    Actual:     B: Spontaneous Breathing Trial Performed?     C: SAT & SBT Coordinated?  na                      D: Delirium: CAM-ICU     E: Early Mobility Performed? yes   F: Feeding Goal:    Status:     Current Diet Order   Procedures    Diet NPO      AS: Analgesia/Sedation prn   T: Thromboembolic Prophylaxis Heparin gtt     H: HOB > 300 yes   U: Stress Ulcer Prophylaxis (if needed) ppi   G: Glucose Control LDSSI   B: Bowel Function     I: Indwelling Catheter (Lines & Olivares) Necessity yes   D: De-escalation of Antimicrobials/Pharmacotherapies no    Plan for the day/ETD Supportive care and monitor for worsening respiratory failure    Code Status:  Family/Goals of Care: Full Code  Care plan discussed with family at bedside.        Hien Pressley MD  Critical Care Medicine  [facility]

## 2018-05-26 NOTE — CONSULTS
Consult received. Full note to follow.    Please call for questions.    Thanks,  Maury Ellis MD  Infectious Disease Fellow, PGY-4  Pager: [phone]  [facility]

## 2018-05-26 NOTE — ASSESSMENT & PLAN NOTE
Continue Vancomycin as mentioned above, goal trough 15-20. Course length will be determined by date of drainage of mediastinal collection.    Recommendations:  Continue vancocomycin

## 2018-05-26 NOTE — HPI
This is a 57 year old female with hx of severe persistent asthma on Xolair, prolonged immunosuppression on steroids, ABPA,bronchiectasis, HTN, DVT, and GERD who is transferred from [facility] for higher level of care. She initially presented to the OSH on May second for progressive shortness of breath, wheezing and orthopnea over a period of 2-3 days associated with low-grade fever. She was admitted to the hospital medicine floor and started on IV solumedrol 40mg bid and was placed on BiPAP. She was weaned off to NC on the second day of admission. Blood cultures from admission grew MRSA bacteremia and patient was started on vancomycin. Urine and sputum cultures both grew MRSA. The patient had persistent MRSA bacteremia despite being on vancomycin and a SHEILA was done on 5/8/2018 that was negative for endocarditis and revealed normal EF%. A bone scan on 5/10/2018 was similarly negative for an infection nidus. On 5/10/2018 the patient developed an acute hypoxic respiratory failure that did not improve promptly on BiPAP with an FiO2 of 100% and the patient was moved to the ICU and started empirically on therapeutic Lovenox. A CTA done on the same day revealed no PE but was notable for worsening bilateral pleural effusion and a non-specific infiltrate/collection around the mid-esophagus. The collection was thought to be a loculated pleural effusion. IV solumedrol was increased to 80mg twice daily and Lovenox was decreased to ppx dosing. By 5/12/2018 the patient's respiratory failure had improved and patient was weaned off of BiPAP to nasal cannula. Sputum cultures collected on 5/13/2018 showed budding yeast and the patient was started on voriconazole. On 5/15/2018 the patient had worsening hypoxia and increased work of breathing and a CXR showed HAP and Ceftaroline was added to vancomycin. On 5/16/2018, repeat blood cultures showed persistent MRSA bacteremia and an Indium scan performed on  5/18/2018 revealed significant uptake by a collection located adjacent to the mid-esophagus. The patient was electively intubated on 5/22/2018 and an EGD revealed a whitish plaque at the proximal esophagus that was biopsied and resulted negative for malignant changes. Similarly, on the same day, an EBUS was performed and showed fluid collection posterior to the distal trachea that was better assessed through 5 FNA passes. Cultures from the EBUS grew MRSA + Alcaligenes/Achromobcater Xylosoxidens. However, FNA was negative for any malignancies. The patient was extubated the same day. Two days later (5/24/2018), the patient developed an acute decompensated respiratory failure with oxygen saturations dropping to the 60's and the patient was emergently intubated. CXR showed left lower lobe atelectasis. An emergent bronchoscopy revealed a mucus plug in the left mainstem bronchus. The mucus plug was brown, thick, and difficult to suction raising suspicion for Aspergillus infection. Following mucus plug disimpaction, the patient's respiratory status recovered immediately and the patient was extubated the next morning (5/25/2018). Prior to that a repeat CT chest showed persistent collection around the mid-esophagus. At this point, it was decided that the patient was better served at a higher level of care facility and the patient was transferred to [facility]. The patient arrived on BiPAP 10/5 and a 45% FiO2 with good oxygen saturation.

## 2018-05-26 NOTE — PLAN OF CARE
Problem: Patient Care Overview  Goal: Plan of Care Review  Outcome: Ongoing (interventions implemented as appropriate)   Patient' s family understands, and agrees with plan of care. ABCDEF Bundle. Not intubated nor sedated. Breathing on 50% FIO2 BiPAP, respirations stable currently 20, lungs diminished pulse ox 90. Cam score negative, patient lethargic and oriented times 1 and can follow commands. Patient can move with two person assist  in bed, and move all extremities. No new signs of skin breakdown. Old breakdown maintained. Patient remain free from fall in injury throughout the shift. Family at bedside and involved with patient care. Patient denies pain throughout the shift. Patient remained NPO through out the shift and voided approprietly each hour. No BM. Side rails up times 2, call light in reach, will continue to monitor.

## 2018-05-26 NOTE — SUBJECTIVE & OBJECTIVE
"Past Medical History:   Diagnosis Date    ABPA (allergic bronchopulmonary aspergillosis)     Allergy     Anxiety     Arachnoid cyst     monitoring    Asthma     COPD (chronic obstructive pulmonary disease)     Hemoptysis     Hypertension     Tachycardia     Thalamic disease     thalamia minor       Past Surgical History:   Procedure Laterality Date    BREAST SURGERY      breast bx     BR[place]COPY  10/2016    [place]     SECTION      CHOLECYSTECTOMY      COLONOSCOPY N/A 2017    Procedure: COLONOSCOPY;  Surgeon: Horacio Pelaez MD;  Location: [facility];  Service: General;  Laterality: N/A;    DENTAL SURGERY      GALLBLADDER SURGERY      HERNIA REPAIR      INTRAUTERINE DEVICE INSERTION      SINUS SURGERY         Review of patient's allergies indicates:   Allergen Reactions    Neosporin (neomycin-polymyx) Rash     "I get a skin rash"    Sulfa (sulfonamide antibiotics) Anaphylaxis    Bactrim [sulfamethoxazole-trimethoprim]     Venlafaxine analogues     Pollen extracts Other (See Comments)     "allergic rhinitis mess"    Vfend [voriconazole] Other (See Comments)     Vision loss       Medications:  Prescriptions Prior to Admission   Medication Sig    albuterol (PROVENTIL) 2.5 mg /3 mL (0.083 %) nebulizer solution Take 2.5 mg by nebulization every 4 (four) hours as needed.    alprazolam (XANAX) 0.5 MG tablet Take 0.5 tablets by mouth daily as needed.     baclofen (LIORESAL) 10 MG tablet Take 10 mg by mouth 3 (three) times daily.    ciclesonide (ALVESCO) 160 mcg/actuation HFAA Inhale 1 puff into the lungs 2 (two) times daily. Controller    dapsone 100 MG Tab Take 100 mg by mouth once daily.    dexlansoprazole (DEXILANT) 60 mg capsule Take 60 mg by mouth once daily.    ergocalciferol (VITAMIN D2) 50,000 unit Cap Take 1 capsule (50,000 Units total) by mouth every 7 days.    escitalopram oxalate (LEXAPRO) 20 MG tablet Take 20 mg by mouth once daily.    fluticasone (FLONASE) 50 " mcg/actuation nasal spray USE 1 SPRAY IN EACH NOSTRIL TWICE A DAY    fluticasone-salmeterol 100-50 mcg/dose (ADVAIR) 100-50 mcg/dose diskus inhaler Inhale 1 puff into the lungs 2 (two) times daily. Controller    guaifenesin (MUCINEX) 600 mg 12 hr tablet Take 1,200 mg by mouth 2 (two) times daily.     ipratropium (ATROVENT) 0.02 % nebulizer solution     LACTOBACILLUS ACIDOPHILUS (PROBIOTIC ACIDOPHILUS ORAL) Take 1 capsule by mouth 2 (two) times daily.    MIRALAX 17 gram/dose powder Take 17 g by mouth daily as needed.    montelukast (SINGULAIR) 10 mg tablet Take 10 mg by mouth once daily.    multivit-min-FA-herbal no.245 (ALIVE WOMEN'S GUMMY VITAMINS) 200 mcg- 37.5 mg Chew Take 2 capsules by mouth once daily.    omalizumab (XOLAIR) 150 mg injection Inject 150 mg into the skin every 14 (fourteen) days.    pravastatin (PRAVACHOL) 80 MG tablet Take 80 mg by mouth once daily.    predniSONE (DELTASONE) 10 MG tablet Take 30 mg in AM and 10 mg in PM    tiotropium bromide 1.25 mcg/actuation Mist Inhale 2.5 mcg into the lungs once daily. Controller    verapamil (VERELAN) 240 MG C24P Take 1 capsule by mouth once daily at 6am.     Antibiotics     Start     Stop Route Frequency Ordered    05/26/18 0045  piperacillin-tazobactam 4.5 g in sodium chloride 0.9% 100 mL IVPB (ready to mix system)      -- IV Every 8 hours (non-standard times) 05/25/18 2344    05/26/18 0010  vancomycin (VANCOCIN) 1,250 mg in sodium chloride 0.9% 250 mL IVPB  (Vancomycin IVPB with levels panel)      -- IV Every 12 hours (non-standard times) 05/26/18 0011        Antifungals     Start     Stop Route Frequency Ordered    05/26/18 0215  voriconazole (VFEND) 330 mg in dextrose 5 % 100 mL IVPB      -- IV Every 12 hours (non-standard times) 05/26/18 0116        Antivirals     None           There is no immunization history for the selected administration types on file for this patient.    Family History     Problem Relation (Age of Onset)    Atrial  fibrillation Mother    Diabetes Mother    Heart disease Father    Hyperlipidemia Mother, Father    Hypertension Mother, Father, Sister        Social History     Social History    Marital status:      Spouse name: N/A    Number of children: 3    Years of education: N/A     Social History Main Topics    Smoking status: Never Smoker    Smokeless tobacco: Never Used    Alcohol use No      Comment: social occ    Drug use: No    Sexual activity: Yes     Partners: Male     Other Topics Concern    None     Social History Narrative    None     Review of Systems   Reason unable to perform ROS: om Bi PAP.     Objective:     Vital Signs (Most Recent):  Temp: 97.8 °F (36.6 °C) (05/26/18 1115)  Pulse: 89 (05/26/18 1520)  Resp: 18 (05/26/18 1520)  BP: 121/65 (05/26/18 1520)  SpO2: (!) 93 % (05/26/18 1520) Vital Signs (24h Range):  Temp:  [97.5 °F (36.4 °C)-98.6 °F (37 °C)] 97.8 °F (36.6 °C)  Pulse:  [] 89  Resp:  [16-23] 18  SpO2:  [81 %-99 %] 93 %  BP: (107-165)/(61-90) 121/65     Weight: 81.4 kg (179 lb 7.3 oz)  Body mass index is 32.82 kg/m².    Estimated Creatinine Clearance: 104.7 mL/min (based on SCr of 0.6 mg/dL).    Physical Exam   Constitutional: She appears well-developed and well-nourished.   HENT:   Head: Normocephalic and atraumatic.   Eyes: EOM are normal. Pupils are equal, round, and reactive to light.   Neck: Normal range of motion.   Cardiovascular: Normal rate, regular rhythm and normal heart sounds.    Pulmonary/Chest: Effort normal. She has wheezes.   Abdominal: Soft. Bowel sounds are normal. She exhibits no distension. There is no tenderness.   Musculoskeletal: Normal range of motion. She exhibits no edema.   Neurological: She is alert.   Skin: No rash noted.       Significant Labs:   Blood Culture:   Recent Labs  Lab 05/25/18  2200 05/25/18  2213   LABBLOO No Growth to date No Growth to date     BMP:   Recent Labs  Lab 05/26/18  0347 05/26/18  1051   *  --      --    K 3.4*  4.2   CL 92*  --    CO2 36*  --    BUN 13  --    CREATININE 0.6  --    CALCIUM 7.6*  --    MG 1.9  --      CBC:   Recent Labs  Lab 05/25/18 2154 05/26/18 0347   WBC 10.06 10.64   HGB 8.8* 8.9*   HCT 27.2* 28.2*    200     CMP:   Recent Labs  Lab 05/25/18 2154 05/26/18 0347 05/26/18  1051    140  --    K 3.3* 3.4* 4.2   CL 94* 92*  --    CO2 36* 36*  --    * 214*  --    BUN 11 13  --    CREATININE 0.6 0.6  --    CALCIUM 7.9* 7.6*  --    PROT 4.8* 5.1*  --    ALBUMIN 2.1* 2.2*  --    BILITOT 0.8 0.7  --    ALKPHOS 515* 500*  --    * 355*  --    * 758*  --    ANIONGAP 10 12  --    EGFRNONAA >60.0 >60.0  --      Microbiology Results (last 7 days)     Procedure Component Value Units Date/Time    Blood culture [[id number]] Collected:  05/25/18 2213    Order Status:  Completed Specimen:  Blood from Peripheral, Hand, Right Updated:  05/26/18 0745     Blood Culture, Routine No Growth to date    Blood culture [[id number]] Collected:  05/25/18 2200    Order Status:  Completed Specimen:  Blood from Peripheral, Antecubital, Left Updated:  05/26/18 0515     Blood Culture, Routine No Growth to date    Culture, Respiratory with Gram Stain [[id number]]     Order Status:  No result Specimen:  Respiratory           Significant Imaging: I have reviewed all pertinent imaging results/findings within the past 24 hours.

## 2018-05-26 NOTE — ASSESSMENT & PLAN NOTE
Patient received treatment followed by Dr Higuera. Voriconazole was discontinued due to vision loss that had not been recovered as per note from 4/18/18. Concerned starting voriconazole in this setting will do more harm than benefit. Recommend to D/C.

## 2018-05-26 NOTE — ASSESSMENT & PLAN NOTE
58 y/o female PMHX Asthma, ABPA, bronchiectasis on steroids and Xolair. Transferred from [facility] for higher level of care. Diagnosed with mediastinal collection positive for Alcaligenes sp (now renamed Achomobacter) and MRSA. From OSH MICs best option meropenem (imipenem intermediate) recommend extended infusion to improve coverage for Achromobacter, need OSH culture data to adequately tailor antibiotic coverage and determine length of therapy. From review of information available only in respiratory culture. Will benefit from CTS consult for source control in order to eradicate infection. This organism develops resistance rapidly and has innate resistance to most drug classes. Continue Vancomycin for MRSA component of infection goal trough 15-20    Recommendations   - discontinue Pip/tazo  - start extended infusion of meropenem  - Continue vancomycin   - Goal trough 15-20  - Consult CTS for possible drainage

## 2018-05-26 NOTE — PLAN OF CARE
Problem: Nutrition, Imbalanced: Inadequate Oral Intake (Adult)  Intervention: Promote/Optimize Nutrition   05/26/18 1251   Nutrition Interventions   Oral Nutrition Promotion calorie dense liquids provided      Recommendations     Recommendation/Intervention:   1.Initiate TF Impact Peptide @40ml/hr +150ml water flushes q4hrs; Provides 1440kcal, 90g/Pro, 1339ml free fl. flushes per MD.   2.Meets 98% EEN & 100%EPN. Start w 10ml/hr and increase by 10 ml/hr q2hr as tolerated until goal rate achieved.   3.Hold for residuals > 300ml  Goals: tolerate TF at Goal rate  Nutrition Goal Status: new  Communication of RD Recs: reviewed with RN       Assessment and Plan         * Acute hypoxemic respiratory failure     Nutrition Problem  increased nutrient needs     Related to (etiology):   Physiological needs 2/2 Acute Res Failure. NPO     Signs and Symptoms (as evidenced by):   Consult for TF rec. Currently no form of nutrition being administered      Interventions/Recommendations (treatment strategy):  See recs above     Nutrition Diagnosis Status:   New

## 2018-05-26 NOTE — CONSULTS
[facility]  Infectious Disease  Consult Note    Patient Name: Kamla Coulter  MRN: [id number]  Admission Date: 5/25/2018  Hospital Length of Stay: 1 days  Attending Physician: Yuval Anderson MD  Primary Care Provider: Molina Alexandre MD     Isolation Status: Contact    Patient information was obtained from spouse/SO and ER records.      Consults  Assessment/Plan:     Fungemia    Patient received treatment with voriconazole followed by Dr Higuera. Voriconazole was discontinued due to vision loss that had not been recovered as per note from 4/18/18. Concerned starting voriconazole in this setting will do more harm than benefit. Recommend to D/C and start isavuconazole as alternative. Will need to contact clinical micro lab from OSH to confirm culture result and origin.     Recommendations   - D/C voriconazole   - start isavuconazole          MRSA bacteremia    Continue Vancomycin as mentioned above, goal trough 15-20. Course length will be determined by date of drainage of mediastinal collection.    Recommendations:  Continue vancocomycin          Mediastinal collection    56 y/o female PMHX Asthma, ABPA, bronchiectasis on steroids and Xolair. Transferred from [facility] for higher level of care. Diagnosed with mediastinal collection positive for Alcaligenes sp (now renamed Achomobacter) and MRSA. From OSH MICs best option meropenem (imipenem intermediate) recommend extended infusion to improve coverage for Achromobacter, need OSH culture data to adequately tailor antibiotic coverage and determine length of therapy. From review of information available only in respiratory culture. Will benefit from CTS consult for source control in order to eradicate infection. This organism develops resistance rapidly and has innate resistance to most drug classes. Continue Vancomycin for MRSA component of infection goal trough 15-20    Recommendations   - discontinue Pip/tazo  - start extended  "infusion of meropenem  - Continue vancomycin   - Goal trough 15-20  - Consult CTS for possible drainage              Thank you for your consult. I will follow-up with patient. Please contact us if you have any additional questions.    Maury Al MD  Infectious Disease  [facility]    Subjective:     Principal Problem: Acute hypoxemic respiratory failure    HPI: Case of 58 y/o female with PMHx athma on steroids and Xolair c/w ABPA and bronchiectasis. Transferred from [facility]. Initially presented on 18 with c/c SOB, low grade fevers. Course complicated with MRSA bacteremia/pneumonia and mediastinal collection positive for Achromobacter sp and MRSA. SHEILA done at OSH negative for IE. Recently extubated on 18 to BiPAP and [place] [facility] [place] ID consulted for antibiotic recommendations. Patient had previosuly being followed by Dr Loco for ABPA had discontinued voriconazole 2ry to vision loss that had not returned to baseline.     Past Medical History:   Diagnosis Date    ABPA (allergic bronchopulmonary aspergillosis)     Allergy     Anxiety     Arachnoid cyst     monitoring    Asthma     COPD (chronic obstructive pulmonary disease)     Hemoptysis     Hypertension     Tachycardia     Thalamic disease     thalamia minor       Past Surgical History:   Procedure Laterality Date    BREAST SURGERY      breast bx     [place]     SECTION      CHOLECYSTECTOMY      COLONOSCOPY N/A 2017    Procedure: COLONOSCOPY;  Surgeon: Horacio Pelaez MD;  Location: [facility];  Service: General;  Laterality: N/A;    DENTAL SURGERY      GALLBLADDER SURGERY      HERNIA REPAIR      INTRAUTERINE DEVICE INSERTION      SINUS SURGERY         Review of patient's allergies indicates:   Allergen Reactions    Neosporin (neomycin-polymyx) Rash     "I get a skin rash"    Sulfa (sulfonamide antibiotics) Anaphylaxis    Bactrim " "[sulfamethoxazole-trimethoprim]     Venlafaxine analogues     Pollen extracts Other (See Comments)     "allergic rhinitis mess"    Vfend [voriconazole] Other (See Comments)     Vision loss       Medications:  Prescriptions Prior to Admission   Medication Sig    albuterol (PROVENTIL) 2.5 mg /3 mL (0.083 %) nebulizer solution Take 2.5 mg by nebulization every 4 (four) hours as needed.    alprazolam (XANAX) 0.5 MG tablet Take 0.5 tablets by mouth daily as needed.     baclofen (LIORESAL) 10 MG tablet Take 10 mg by mouth 3 (three) times daily.    ciclesonide (ALVESCO) 160 mcg/actuation HFAA Inhale 1 puff into the lungs 2 (two) times daily. Controller    dapsone 100 MG Tab Take 100 mg by mouth once daily.    dexlansoprazole (DEXILANT) 60 mg capsule Take 60 mg by mouth once daily.    ergocalciferol (VITAMIN D2) 50,000 unit Cap Take 1 capsule (50,000 Units total) by mouth every 7 days.    escitalopram oxalate (LEXAPRO) 20 MG tablet Take 20 mg by mouth once daily.    fluticasone (FLONASE) 50 mcg/actuation nasal spray USE 1 SPRAY IN EACH NOSTRIL TWICE A DAY    fluticasone-salmeterol 100-50 mcg/dose (ADVAIR) 100-50 mcg/dose diskus inhaler Inhale 1 puff into the lungs 2 (two) times daily. Controller    guaifenesin (MUCINEX) 600 mg 12 hr tablet Take 1,200 mg by mouth 2 (two) times daily.     ipratropium (ATROVENT) 0.02 % nebulizer solution     LACTOBACILLUS ACIDOPHILUS (PROBIOTIC ACIDOPHILUS ORAL) Take 1 capsule by mouth 2 (two) times daily.    MIRALAX 17 gram/dose powder Take 17 g by mouth daily as needed.    montelukast (SINGULAIR) 10 mg tablet Take 10 mg by mouth once daily.    multivit-min-FA-herbal no.245 (ALIVE WOMEN'S GUMMY VITAMINS) 200 mcg- 37.5 mg Chew Take 2 capsules by mouth once daily.    omalizumab (XOLAIR) 150 mg injection Inject 150 mg into the skin every 14 (fourteen) days.    pravastatin (PRAVACHOL) 80 MG tablet Take 80 mg by mouth once daily.    predniSONE (DELTASONE) 10 MG tablet Take 30 " mg in AM and 10 mg in PM    tiotropium bromide 1.25 mcg/actuation Mist Inhale 2.5 mcg into the lungs once daily. Controller    verapamil (VERELAN) 240 MG C24P Take 1 capsule by mouth once daily at 6am.     Antibiotics     Start     Stop Route Frequency Ordered    05/26/18 0045  piperacillin-tazobactam 4.5 g in sodium chloride 0.9% 100 mL IVPB (ready to mix system)      -- IV Every 8 hours (non-standard times) 05/25/18 2344    05/26/18 0010  vancomycin (VANCOCIN) 1,250 mg in sodium chloride 0.9% 250 mL IVPB  (Vancomycin IVPB with levels panel)      -- IV Every 12 hours (non-standard times) 05/26/18 0011        Antifungals     Start     Stop Route Frequency Ordered    05/26/18 0215  voriconazole (VFEND) 330 mg in dextrose 5 % 100 mL IVPB      -- IV Every 12 hours (non-standard times) 05/26/18 0116        Antivirals     None           There is no immunization history for the selected administration types on file for this patient.    Family History     Problem Relation (Age of Onset)    Atrial fibrillation Mother    Diabetes Mother    Heart disease Father    Hyperlipidemia Mother, Father    Hypertension Mother, Father, Sister        Social History     Social History    Marital status:      Spouse name: N/A    Number of children: 3    Years of education: N/A     Social History Main Topics    Smoking status: Never Smoker    Smokeless tobacco: Never Used    Alcohol use No      Comment: social occ    Drug use: No    Sexual activity: Yes     Partners: Male     Other Topics Concern    None     Social History Narrative    None     Review of Systems   Reason unable to perform ROS: om Bi PAP.     Objective:     Vital Signs (Most Recent):  Temp: 97.8 °F (36.6 °C) (05/26/18 1115)  Pulse: 89 (05/26/18 1520)  Resp: 18 (05/26/18 1520)  BP: 121/65 (05/26/18 1520)  SpO2: (!) 93 % (05/26/18 1520) Vital Signs (24h Range):  Temp:  [97.5 °F (36.4 °C)-98.6 °F (37 °C)] 97.8 °F (36.6 °C)  Pulse:  [] 89  Resp:  [16-23]  18  SpO2:  [81 %-99 %] 93 %  BP: (107-165)/(61-90) 121/65     Weight: 81.4 kg (179 lb 7.3 oz)  Body mass index is 32.82 kg/m².    Estimated Creatinine Clearance: 104.7 mL/min (based on SCr of 0.6 mg/dL).    Physical Exam   Constitutional: She appears well-developed and well-nourished.   HENT:   Head: Normocephalic and atraumatic.   Eyes: EOM are normal. Pupils are equal, round, and reactive to light.   Neck: Normal range of motion.   Cardiovascular: Normal rate, regular rhythm and normal heart sounds.    Pulmonary/Chest: Effort normal. She has wheezes.   Abdominal: Soft. Bowel sounds are normal. She exhibits no distension. There is no tenderness.   Musculoskeletal: Normal range of motion. She exhibits no edema.   Neurological: She is alert.   Skin: No rash noted.       Significant Labs:   Blood Culture:   Recent Labs  Lab 05/25/18 2200 05/25/18 2213   LABBLOO No Growth to date No Growth to date     BMP:   Recent Labs  Lab 05/26/18 0347 05/26/18  1051   *  --      --    K 3.4* 4.2   CL 92*  --    CO2 36*  --    BUN 13  --    CREATININE 0.6  --    CALCIUM 7.6*  --    MG 1.9  --      CBC:   Recent Labs  Lab 05/25/18 2154 05/26/18 0347   WBC 10.06 10.64   HGB 8.8* 8.9*   HCT 27.2* 28.2*    200     CMP:   Recent Labs  Lab 05/25/18 2154 05/26/18 0347 05/26/18  1051    140  --    K 3.3* 3.4* 4.2   CL 94* 92*  --    CO2 36* 36*  --    * 214*  --    BUN 11 13  --    CREATININE 0.6 0.6  --    CALCIUM 7.9* 7.6*  --    PROT 4.8* 5.1*  --    ALBUMIN 2.1* 2.2*  --    BILITOT 0.8 0.7  --    ALKPHOS 515* 500*  --    * 355*  --    * 758*  --    ANIONGAP 10 12  --    EGFRNONAA >60.0 >60.0  --      Microbiology Results (last 7 days)     Procedure Component Value Units Date/Time    Blood culture [[id number]] Collected:  05/25/18 2216    Order Status:  Completed Specimen:  Blood from Peripheral, Hand, Right Updated:  05/26/18 0745     Blood Culture, Routine No Growth to date    Blood  culture [[id number]] Collected:  05/25/18 2200    Order Status:  Completed Specimen:  Blood from Peripheral, Antecubital, Left Updated:  05/26/18 0515     Blood Culture, Routine No Growth to date    Culture, Respiratory with Gram Stain [[id number]]     Order Status:  No result Specimen:  Respiratory           Significant Imaging: I have reviewed all pertinent imaging results/findings within the past 24 hours.

## 2018-05-27 PROBLEM — R79.89 ABNORMAL LFTS: Status: ACTIVE | Noted: 2018-01-01

## 2018-05-27 PROBLEM — B49 FUNGEMIA: Status: RESOLVED | Noted: 2018-01-01 | Resolved: 2018-01-01

## 2018-05-27 PROBLEM — H11.423: Status: ACTIVE | Noted: 2018-01-01

## 2018-05-27 NOTE — CONSULTS
Pt Evaluated 5/26 See RD Note for full assessment       Problem: Nutrition, Imbalanced: Inadequate Oral Intake (Adult)  Intervention: Promote/Optimize Nutrition    05/26/18 1251   Nutrition Interventions   Oral Nutrition Promotion calorie dense liquids provided      Recommendations     Recommendation/Intervention:   1.Initiate TF Impact Peptide @40ml/hr +150ml water flushes q6hrs; Provides 1440kcal, 90g/Pro, 1339ml free fl. flushes per MD.   2.Meets 98% EEN & 100%EPN. Start w 10ml/hr and increase by 10 ml/hr q2hr as tolerated until goal rate achieved.   3.Hold for residuals > 300ml  Goals: tolerate TF at Goal rate  Nutrition Goal Status: new  Communication of RD Recs: reviewed with RN        Assessment and Plan           * Acute hypoxemic respiratory failure     Nutrition Problem  increased nutrient needs     Related to (etiology):   Physiological needs 2/2 Acute Res Failure. NPO     Signs and Symptoms (as evidenced by):   Consult for TF rec. Currently no form of nutrition being administered      Interventions/Recommendations (treatment strategy):  See recs above     Nutrition Diagnosis Status:   New

## 2018-05-27 NOTE — PLAN OF CARE
Problem: Patient Care Overview  Goal: Plan of Care Review  Outcome: Ongoing (interventions implemented as appropriate)  Patient' s family understands, and agrees with plan of care. ABCDEF Bundle. Not intubated nor sedated. Breathing on 60% FIO2 BiPAP, respirations stable currently 20, lungs diminished pulse ox 90. Cam score negative, patient alert and oriented times 4 and can follow commands. Patient can move with two person assist  in bed, and move all extremities. No new signs of skin breakdown. Old breakdown maintained. Patient remain free from fall in injury throughout the shift. Family at bedside and involved with patient care. Patient denies pain throughout the shift. No BM. Side rails up times 2, call light in reach, will continue to monitor.

## 2018-05-27 NOTE — ASSESSMENT & PLAN NOTE
-- on Xolair and prednisone taper at home.   -- mx as above per hypoxic resp failure.  -- continue IV solumedrol 60mg bid.

## 2018-05-27 NOTE — HOSPITAL COURSE
Admitted as a transfer from [facility] on 5/25/2018 for higher level of care. Patient required BiPAP on admission but was able to wean to HFNC on second day of hospitalization. ID was consulted and had made changes to antimicrobials with continuing vancomycin, switching voriconazole to isavuconazonium and zosyn to meropenem. Blood cultures showed NGTD.  05/28/2018 Worsening respiratory status requiring intubation morning of 5/28/2018 for oxygen saturation in the lower 70's with emergent bronchoscopy done following intubation revealing left system mucus plugging. Following suctioning of mucus plugging oxygen saturation had finally improved. Repeat CT chest did not show mediastinal collection.    05/29/2018 drop in H/H requiring two units of pRBC. Heparin held. Plan for extubation today. Continuing diuresis and management of resp secretions. CT abdomen revealed no intraabdominal source of bleeding. Patient had repeat bronchoscopy and extubated to [facility].    05/30/2018 resuming heparin gtt and weaning oxygen requirement prn. Delirious overnight.       06/01/2018 NG removed by patient overnight. Patient with SLP repeat eval this am. Continue weaning off oxygen.     06/02/2018 CTA negative for PE and heparin held. Speech eval today.     06/03/2018 continue supportive care and weaning oxygen requirement and steroids.     06/04/2018 No acute events overnight.  Patient  C/o increased anxiety overnight and still on CPAP w/ PEEP 5 & FiO2 40%, intubated for airway protection    06/05 Bronch negative for mucous plusg    06/06 Extubated   06/07 Lasix gtt for diuresis. Esmolol d/c'ed due to hypotension; Christian drip started transiently and off at 20:26  06/08 SBP improved. LFTs trending up and HR 120s  06/09 Increasing verapamil. Pt w/ more LE pain. US LE. BiPAP at night and CF during the day.  06/10 Patient with several episodes of desaturation overnight 2/2 mucus plugging. After a lengthy discussion w/ patient and family,  patient chose to be DNR at this time.    Patient w/ worsening resp status overnight; DNR revoked and patient intubated. On three pressors w/ tachycardia. Family elected to make patient partial code w/o chest compressions. In the evening, the patient's family decided to discontinue pressor support, and patient  soon afterwards.

## 2018-05-27 NOTE — NURSING
Patient is now therapeutic off of the Heparin drip, Critical Care was called to see if drip could be restarted at 17 units. Critical care stated that pharmacy should be notified to get suggestions on what rate Heparin drip should be started. Dr. Lozano In pharmacy said that she would talk to the charge pharmacist to see what rate heparin drip should be started, Tika MORALES notified of this matter, will continue to monitor.

## 2018-05-27 NOTE — PLAN OF CARE
Problem: Patient Care Overview  Goal: Plan of Care Review  No acute events throughout night. See vital signs and assessments in flowsheets. See below for updates on today's progress.     Pulmonary: On continuous BIPAP with FIO2 50%    Cardiovascular: Sinus to Sinus Tachycardia, heart rate ranges from 80-115beats per minute. Her MAP is above 65mmHg. SBP went up to 200mmHG informed the team. Labetalol 10mg IVP was given as ordered.    Neurological: Patient is oriented to time, place and person.    Gastrointestinal: Patient is NPO. NGT in place for oral meds    Genitourinary: Olivares catether in place voiding adequate amount of urine.    Endocrine: CBS monitoring very 6 hours    Integumentary/Other: Bruises and skin tears are all over the the body.     Infusions: Heparin infusion       Magnesium sulfate and potassium chloride IV drips were given as per provider based on latest lab result.  Patient progressing towards goals as tolerated, plan of care communicated and reviewed with Kamla Coulter and family. All concerns addressed. Will continue to monitor.

## 2018-05-27 NOTE — SUBJECTIVE & OBJECTIVE
Interval History/Significant Events:   Hypertensive overnight. Off BiPAP this am to HFNC with good oxygen saturation.    Review of Systems   Constitutional: no fever or chills  ENT: no nasal congestion or sore throat  Respiratory: no cough, + shortness of breath  Cardiovascular: no chest pain or palpitations  Gastrointestinal: no nausea or vomiting, no abdominal pain   Genitourinary: no hematuria or dysuria  Integument/Breast: no rash or pruritis  Hematologic/Lymphatic: no easy bruising or lymphadenopathy  Musculoskeletal: no arthralgias or myalgias  Neurological: no seizures or tremors  Endocrine: no heat or cold intolerance    Objective:     Vital Signs (Most Recent):  Temp: 97.8 °F (36.6 °C) (05/27/18 0715)  Pulse: (!) 111 (05/27/18 0834)  Resp: 18 (05/27/18 0834)  BP: (!) 179/88 (05/27/18 0715)  SpO2: 100 % (05/27/18 0715) Vital Signs (24h Range):  Temp:  [97.8 °F (36.6 °C)-98.8 °F (37.1 °C)] 97.8 °F (36.6 °C)  Pulse:  [] 111  Resp:  [15-35] 18  SpO2:  [90 %-100 %] 100 %  BP: (107-216)/(61-98) 179/88   Weight: 81.4 kg (179 lb 7.3 oz)  Body mass index is 32.82 kg/m².      Intake/Output Summary (Last 24 hours) at 05/27/18 0843  Last data filed at 05/27/18 0600   Gross per 24 hour   Intake          1538.55 ml   Output             2585 ml   Net         -1046.45 ml       Physical Exam  General: obese lady with Cushingoid facies, tachypnic  Eyes: chemosis with lateral conjunctival edema b/l, PERRL. EOMI.   Neck: supple, symmetrical, trachea midline, no JVD.  HENT: NG tube in place.  NG in place.  Cardiovascular: Heart: tachycardic, regular rhythm, S1, S2 normal, no murmur, click, rub or gallop.  Lungs: inspiratory and expiratory wheezes present.   Chest Wall: no tenderness.  Abdomen/Rectal: Abdomen: Non distended. No BS. No masses. No TTP.   Extremities: 2+ pitting edema up to the knees, no redness or tenderness in the calves or thighs. Pulses: 2+ and symmetric  Skin: Skin color, texture, turgor normal. No  rashes or lesions  Musculoskeletal: full range of motion of joints.   Lymph Nodes: No cervical or supraclavicular adenopathy  Psych/Behavioral: Normal. Alert and oriented and follows commands.    Oxygen Concentration (%): 50 (05/27/18 0600)  Lines/Drains/Airways     Peripherally Inserted Central Catheter Line                 PICC Double Lumen 05/24/18 left brachial 3 days          Drain                 NG/OG Tube 05/24/18 0000 3 days         Urethral Catheter 05/24/18 16 Fr. 3 days          Peripheral Intravenous Line                 Peripheral IV - Single Lumen 05/26/18 0200 Left Forearm 1 day              Significant Labs:    CBC/Anemia Profile:    Recent Labs  Lab 05/25/18 2154 05/26/18  0347 05/27/18  0104   WBC 10.06 10.64 12.57   HGB 8.8* 8.9* 8.5*   HCT 27.2* 28.2* 27.0*    200 209   MCV 94 95 96   RDW 17.0* 17.0* 16.9*        Chemistries:    Recent Labs  Lab 05/25/18 2154 05/26/18  0347 05/26/18  1051 05/27/18  0104    140  --  144   K 3.3* 3.4* 4.2 3.0*   CL 94* 92*  --  91*   CO2 36* 36*  --  40*   BUN 11 13  --  16   CREATININE 0.6 0.6  --  0.7   CALCIUM 7.9* 7.6*  --  7.6*   ALBUMIN 2.1* 2.2*  --  2.1*   PROT 4.8* 5.1*  --  4.8*   BILITOT 0.8 0.7  --  0.5   ALKPHOS 515* 500*  --  432*   * 758*  --  583*   * 355*  --  143*   MG 2.0 1.9  --  1.6   PHOS 3.0 2.9  --  2.8

## 2018-05-27 NOTE — PROGRESS NOTES
[facility]  Infectious Disease  Progress Note    Patient Name: Kamla Coulter  MRN: [id number]  Admission Date: 5/25/2018  Length of Stay: 2 days  Attending Physician: Yuval Anderson MD  Primary Care Provider: Molina Alexandre MD    Isolation Status: Contact  Assessment/Plan:      MRSA bacteremia    57-year-old female   - Severe persistent asthma on omalizumab, prolonged steroid use  - History of ABPA  - Bronchiectasis  - Voriconazole intolerance - vision loss  - MRSA bacteremia, bacteriuria, pneumonia - SHEILA negative  - Mediastinal mass s/p EUS with biopsy - no malignancy identified, no cultures sent  - Achromobacter xylogens pneumonia  - Asperigillus fumigatus colonization vs invasive infection   - Transaminitis      Patient with mediastinal mass - possibly source of persistent MRSA bacteremia, need drainage for ultimate source control.    Recommendations:  - Upload CT chest or repeat CT chest  - Continue vancomycin, goal trough 15-20, will need at least 6 weeks  - Start prolonged infusion meropenem for coverage of Achromobacter, plan for 7 day course  - Continue isavuconazole  - Follow-up blood cultures  - Appreciate IR/ CTS recs regarding drainage of mediastinal mass / abscess  - Start dapsone for PJP prophylaxis given prolonged steroid use               Anticipated Disposition: clinical improvement    Thank you for your consult. I will follow-up with patient. Please contact us if you have any additional questions.    Zora Johns MD  Infectious Disease  [facility]    Subjective:     Principal Problem:Acute hypoxemic respiratory failure    HPI: Case of 56 y/o female with PMHx athma on steroids and Xolair c/w ABPA and bronchiectasis. Transferred from [facility]. Initially presented on 5/2/18 with c/c SOB, low grade fevers. Course complicated with MRSA bacteremia/pneumonia and mediastinal collection positive for Achromobacter sp and MRSA. SHEILA done at OSH  negative for IE. Recently extubated on 5/25/18 to BiPAP and transferred to [facility] for higher level of care ID consulted for antibiotic recommendations. Patient had previosuly being followed by Dr Loco for ABPA had discontinued voriconazole 2ry to vision loss that had not returned to baseline.   Interval History:   - No fevers  - On nasal cannula this AM  - Back on BiPaP for comfort  - No cough    Review of Systems   Constitutional: Negative for chills, diaphoresis and fever.   HENT: Negative for rhinorrhea and sore throat.    Respiratory: Positive for shortness of breath and wheezing. Negative for cough.    Cardiovascular: Negative for chest pain and leg swelling.   Gastrointestinal: Negative for abdominal pain, diarrhea, nausea and vomiting.   Genitourinary: Negative for dysuria and hematuria.   Musculoskeletal: Negative for arthralgias and myalgias.   Skin: Negative for rash.   Neurological: Negative for headaches.     Objective:     Vital Signs (Most Recent):  Temp: 98 °F (36.7 °C) (05/27/18 1115)  Pulse: 93 (05/27/18 1544)  Resp: 20 (05/27/18 1544)  BP: (!) 188/80 (05/27/18 1200)  SpO2: 100 % (05/27/18 1544) Vital Signs (24h Range):  Temp:  [97.8 °F (36.6 °C)-98.8 °F (37.1 °C)] 98 °F (36.7 °C)  Pulse:  [] 93  Resp:  [15-67] 20  SpO2:  [85 %-100 %] 100 %  BP: (139-216)/(70-98) 188/80     Weight: 81.4 kg (179 lb 7.3 oz)  Body mass index is 32.82 kg/m².    Estimated Creatinine Clearance: 89.7 mL/min (based on SCr of 0.7 mg/dL).    Physical Exam   Constitutional: She is oriented to person, place, and time. She appears well-developed and well-nourished. No distress.   HENT:   Head: Normocephalic and atraumatic.   On BiPAP, cushingoid features   Eyes: EOM are normal.   Left conjunctival bleb   Neck: Normal range of motion. Neck supple.   Cardiovascular: Normal rate.    Pulmonary/Chest: Effort normal. No respiratory distress. She has wheezes.   Abdominal: Soft. She exhibits no distension. There is no tenderness.    Musculoskeletal: Normal range of motion. She exhibits no edema.   Neurological: She is alert and oriented to person, place, and time.   Skin: Skin is warm and dry. No rash noted. She is not diaphoretic. No erythema.   Psychiatric: She has a normal mood and affect. Her behavior is normal.       Significant Labs: All pertinent labs within the past 24 hours have been reviewed.    Significant Imaging: I have reviewed all pertinent imaging results/findings within the past 24 hours.

## 2018-05-27 NOTE — ASSESSMENT & PLAN NOTE
-- likely due to being on steroids chronically.   -- a1c 5.2, good poct glucose. d/c LDSSI.   -- initiate TF / diet today.

## 2018-05-27 NOTE — PROGRESS NOTES
Called [facility] for updates on microbiology studies:    1. MRSA + cultures in urine on presentation and in blood up to 5/16/2018 and in bronchial washes on 5/22/2018 (during EBUS and mediastinal mass sampling) and 5/24/2018.    2. Achromobacter on bronchial wash on 5/22 during EBUS and mediastinal mass sampling.     3. Budding yeast present on sputum culture on 5/12/2018.      JOSE J Alston  PGY-3  Internal Medicine  Pager: [phone]

## 2018-05-27 NOTE — NURSING
Critical care notified about patient drop in urine output hourly. Verbal order to bolus 250 of normal saline, will continue to monitor.

## 2018-05-27 NOTE — SUBJECTIVE & OBJECTIVE
Interval History:   - No fevers  - On nasal cannula this AM  - Back on BiPaP for comfort  - No cough    Review of Systems   Constitutional: Negative for chills, diaphoresis and fever.   HENT: Negative for rhinorrhea and sore throat.    Respiratory: Positive for shortness of breath and wheezing. Negative for cough.    Cardiovascular: Negative for chest pain and leg swelling.   Gastrointestinal: Negative for abdominal pain, diarrhea, nausea and vomiting.   Genitourinary: Negative for dysuria and hematuria.   Musculoskeletal: Negative for arthralgias and myalgias.   Skin: Negative for rash.   Neurological: Negative for headaches.     Objective:     Vital Signs (Most Recent):  Temp: 98 °F (36.7 °C) (05/27/18 1115)  Pulse: 93 (05/27/18 1544)  Resp: 20 (05/27/18 1544)  BP: (!) 188/80 (05/27/18 1200)  SpO2: 100 % (05/27/18 1544) Vital Signs (24h Range):  Temp:  [97.8 °F (36.6 °C)-98.8 °F (37.1 °C)] 98 °F (36.7 °C)  Pulse:  [] 93  Resp:  [15-67] 20  SpO2:  [85 %-100 %] 100 %  BP: (139-216)/(70-98) 188/80     Weight: 81.4 kg (179 lb 7.3 oz)  Body mass index is 32.82 kg/m².    Estimated Creatinine Clearance: 89.7 mL/min (based on SCr of 0.7 mg/dL).    Physical Exam   Constitutional: She is oriented to person, place, and time. She appears well-developed and well-nourished. No distress.   HENT:   Head: Normocephalic and atraumatic.   On BiPAP, cushingoid features   Eyes: EOM are normal.   Left conjunctival bleb   Neck: Normal range of motion. Neck supple.   Cardiovascular: Normal rate.    Pulmonary/Chest: Effort normal. No respiratory distress. She has wheezes.   Abdominal: Soft. She exhibits no distension. There is no tenderness.   Musculoskeletal: Normal range of motion. She exhibits no edema.   Neurological: She is alert and oriented to person, place, and time.   Skin: Skin is warm and dry. No rash noted. She is not diaphoretic. No erythema.   Psychiatric: She has a normal mood and affect. Her behavior is normal.        Significant Labs: All pertinent labs within the past 24 hours have been reviewed.    Significant Imaging: I have reviewed all pertinent imaging results/findings within the past 24 hours.

## 2018-05-27 NOTE — NURSING
Critical care notified about patient's drop in urine output, 250 normal saline bolus dose given per verbal read back by Dr Wade, will continue to monitor.

## 2018-05-27 NOTE — ASSESSMENT & PLAN NOTE
-- likely due to drug adverse effect suspected from voriconazole. Switched to isavuconazonium in setting of conjunctival swelling now with improving LFT's.

## 2018-05-27 NOTE — ASSESSMENT & PLAN NOTE
57-year-old female   - Severe persistent asthma on omalizumab, prolonged steroid use  - History of ABPA  - Bronchiectasis  - Voriconazole intolerance - vision loss  - MRSA bacteremia, bacteriuria, pneumonia - SHEILA negative  - Mediastinal mass s/p EUS with biopsy - no malignancy identified, no cultures sent  - Achromobacter xylogens pneumonia  - Asperigillus fumigatus colonization vs invasive infection   - Transaminitis      Patient with mediastinal mass - possibly source of persistent MRSA bacteremia, need drainage for ultimate source control.    Recommendations:  - Upload CT chest or repeat CT chest  - Continue vancomycin, goal trough 15-20, will need at least 6 weeks  - Start prolonged infusion meropenem for coverage of Achromobacter, plan for 7 day course  - Continue isavuconazole  - Follow-up blood cultures  - Appreciate IR/ CTS recs regarding drainage of mediastinal mass / abscess  - Start dapsone for PJP prophylaxis given prolonged steroid use

## 2018-05-27 NOTE — PROGRESS NOTES
[facility]  Critical Care Medicine  Progress Note    Patient Name: Kamla Coulter  MRN: [id number]  Admission Date: 5/25/2018  Hospital Length of Stay: 2 days  Code Status: Full Code  Attending Provider: Yuval Anderson MD  Primary Care Provider: Molina Alexandre MD   Principal Problem: Acute hypoxemic respiratory failure    Subjective:     HPI:    This is a 57 year old female with hx of severe persistent asthma on Xolair, prolonged immunosuppression on steroids, ABPA,bronchiectasis, HTN, DVT, and GERD who is transferred from [facility] for higher level of care. She initially presented to the OSH on May second for progressive shortness of breath, wheezing and orthopnea over a period of 2-3 days associated with low-grade fever. She was admitted to the hospital medicine floor and started on IV solumedrol 40mg bid and was placed on BiPAP. She was weaned off to NC on the second day of admission. Blood cultures from admission grew MRSA bacteremia and patient was started on vancomycin. Urine and sputum cultures both grew MRSA. The patient had persistent MRSA bacteremia despite being on vancomycin and a SHEILA was done on 5/8/2018 that was negative for endocarditis and revealed normal EF%. A bone scan on 5/10/2018 was similarly negative for an infection nidus. On 5/10/2018 the patient developed an acute hypoxic respiratory failure that did not improve promptly on BiPAP with an FiO2 of 100% and the patient was moved to the ICU and started empirically on therapeutic Lovenox. A CTA done on the same day revealed no PE but was notable for worsening bilateral pleural effusion and a non-specific infiltrate/collection around the mid-esophagus. The collection was thought to be a loculated pleural effusion. IV solumedrol was increased to 80mg twice daily and Lovenox was decreased to ppx dosing. By 5/12/2018 the patient's respiratory failure had improved and patient was weaned off of BiPAP  to nasal cannula. Sputum cultures collected on 5/13/2018 showed budding yeast and the patient was started on voriconazole. On 5/15/2018 the patient had worsening hypoxia and increased work of breathing and a CXR showed HAP and Ceftaroline was added to vancomycin. On 5/16/2018, repeat blood cultures showed persistent MRSA bacteremia and an Indium scan performed on 5/18/2018 revealed significant uptake by a collection located adjacent to the mid-esophagus. The patient was electively intubated on 5/22/2018 and an EGD revealed a whitish plaque at the proximal esophagus that was biopsied and resulted negative for malignant changes. Similarly, on the same day, an EBUS was performed and showed fluid collection posterior to the distal trachea that was better assessed through 5 FNA passes. Cultures from the EBUS grew MRSA + Alcaligenes/Achromobcater Xylosoxidens. However, FNA was negative for any malignancies. The patient was extubated the same day. Two days later (5/24/2018), the patient developed an acute decompensated respiratory failure with oxygen saturations dropping to the 60's and the patient was emergently intubated. CXR showed left lower lobe atelectasis. An emergent bronchoscopy revealed a mucus plug in the left mainstem bronchus. The mucus plug was brown, thick, and difficult to suction raising suspicion for Aspergillus infection. Following mucus plug disimpaction, the patient's respiratory status recovered immediately and the patient was extubated the next morning (5/25/2018). Prior to that a repeat CT chest showed persistent collection around the mid-esophagus. At this point, it was decided that the patient was better served at a higher level of care facility and the patient was transferred to [facility]. The patient arrived on BiPAP 10/5 and a 45% FiO2 with good oxygen saturation.       Hospital/ICU Course:  Admitted as a transfer from [facility] on 5/25/2018 for higher level of care. Patient required BiPAP  on admission but was able to wean to HFNC on second day of hospitalization. ID was consulted and had made changes to antimicrobials with continuing vancomycin, switching voriconazole to isavuconazonium and zosyn to meropenem. Blood cultures showed NGTD.    Interval History/Significant Events:   Hypertensive overnight. Off BiPAP this am to HFNC with good oxygen saturation.    Review of Systems   Constitutional: no fever or chills  ENT: no nasal congestion or sore throat  Respiratory: no cough, + shortness of breath  Cardiovascular: no chest pain or palpitations  Gastrointestinal: no nausea or vomiting, no abdominal pain   Genitourinary: no hematuria or dysuria  Integument/Breast: no rash or pruritis  Hematologic/Lymphatic: no easy bruising or lymphadenopathy  Musculoskeletal: no arthralgias or myalgias  Neurological: no seizures or tremors  Endocrine: no heat or cold intolerance    Objective:     Vital Signs (Most Recent):  Temp: 97.8 °F (36.6 °C) (05/27/18 0715)  Pulse: (!) 111 (05/27/18 0834)  Resp: 18 (05/27/18 0834)  BP: (!) 179/88 (05/27/18 0715)  SpO2: 100 % (05/27/18 0715) Vital Signs (24h Range):  Temp:  [97.8 °F (36.6 °C)-98.8 °F (37.1 °C)] 97.8 °F (36.6 °C)  Pulse:  [] 111  Resp:  [15-35] 18  SpO2:  [90 %-100 %] 100 %  BP: (107-216)/(61-98) 179/88   Weight: 81.4 kg (179 lb 7.3 oz)  Body mass index is 32.82 kg/m².      Intake/Output Summary (Last 24 hours) at 05/27/18 0843  Last data filed at 05/27/18 0600   Gross per 24 hour   Intake          1538.55 ml   Output             2585 ml   Net         -1046.45 ml       Physical Exam  General: obese lady with Cushingoid facies, tachypnic  Eyes: chemosis with lateral conjunctival edema b/l, PERRL. EOMI.   Neck: supple, symmetrical, trachea midline, no JVD.  HENT: NG tube in place.  NG in place.  Cardiovascular: Heart: tachycardic, regular rhythm, S1, S2 normal, no murmur, click, rub or gallop.  Lungs: inspiratory and expiratory wheezes present.   Chest Wall:  no tenderness.  Abdomen/Rectal: Abdomen: Non distended. No BS. No masses. No TTP.   Extremities: 2+ pitting edema up to the knees, no redness or tenderness in the calves or thighs. Pulses: 2+ and symmetric  Skin: Skin color, texture, turgor normal. No rashes or lesions  Musculoskeletal: full range of motion of joints.   Lymph Nodes: No cervical or supraclavicular adenopathy  Psych/Behavioral: Normal. Alert and oriented and follows commands.    Oxygen Concentration (%): 50 (05/27/18 0600)  Lines/Drains/Airways     Peripherally Inserted Central Catheter Line                 PICC Double Lumen 05/24/18 left brachial 3 days          Drain                 NG/OG Tube 05/24/18 0000 3 days         Urethral Catheter 05/24/18 16 Fr. 3 days          Peripheral Intravenous Line                 Peripheral IV - Single Lumen 05/26/18 0200 Left Forearm 1 day              Significant Labs:    CBC/Anemia Profile:    Recent Labs  Lab 05/25/18 2154 05/26/18  0347 05/27/18  0104   WBC 10.06 10.64 12.57   HGB 8.8* 8.9* 8.5*   HCT 27.2* 28.2* 27.0*    200 209   MCV 94 95 96   RDW 17.0* 17.0* 16.9*        Chemistries:    Recent Labs  Lab 05/25/18  2154 05/26/18  0347 05/26/18  1051 05/27/18  0104    140  --  144   K 3.3* 3.4* 4.2 3.0*   CL 94* 92*  --  91*   CO2 36* 36*  --  40*   BUN 11 13  --  16   CREATININE 0.6 0.6  --  0.7   CALCIUM 7.9* 7.6*  --  7.6*   ALBUMIN 2.1* 2.2*  --  2.1*   PROT 4.8* 5.1*  --  4.8*   BILITOT 0.8 0.7  --  0.5   ALKPHOS 515* 500*  --  432*   * 758*  --  583*   * 355*  --  143*   MG 2.0 1.9  --  1.6   PHOS 3.0 2.9  --  2.8           Assessment/Plan:     Ophtho   Conjunctival edema of both eyes    -- likely due to voriconazole.         Pulmonary   * Acute hypoxemic respiratory failure    -- likely multifactorial with asthma exacerbation, HAP, and volume overload all likely to be contributing. Ddx of PE considered less likely in setting of recent CTA on 5/8/2018 being negative and CT  chest with contrast on 5/25/2018 with grossly normal pulmonary vasculature in addition to patient being on appropriate dvt ppx make the dx of PE less likely.  -- continue broad spectrum abx with vancomycin and meropenem covering for Achromobacter xylogens in setting of HAP. Continue Solumedrol 60mg bid and consider increasing frequency.  -- duonebs [id number].   -- holding lasix with increasing BUN and alkalosis with HCO36 of 40.   -- continue close monitoring            Severe persistent asthma    -- on Xolair and prednisone taper at home.   -- mx as above per hypoxic resp failure.  -- continue IV solumedrol 60mg bid.        Cardiac/Vascular   Mixed hyperlipidemia    -- continue home pravachol        Essential hypertension    -- Resume home verapamil 240mg od.         ID   ABPA (allergic bronchopulmonary aspergillosis)    -- s/p voriconazole and steroid taper treated at [facility] [place] end of 2017.   -- patient with budding yeast reportedly in sputum but no fungemia documented.         MRSA bacteremia     -- MRSA grew initially in urine, then blood, and finally in mediastinal collection.   -- SHEILA performed 5/8/2018 was negative for endocarditis.  -- hx of prolonged immunosuppression on Prednisone and Xolair predisposing patient to fungal infection. Patient with recent hx of ABPA completed course of voriconazole and steroid taper.   -- continue IV Vancomycin. Repeat cultures here NGTD. Last sterile blood culture per OSH was 5/18/2018. Will plan for 2 weeks course of IV abx following this date unless repeat blood cultures here are positive. Repeat blood cultures here with NGTD.    -- s/p voriconazole and steroid taper end of 2017.   -- switch Zosyn to meropenem for 7 day covering for Achromobacter/Alcaligenes xylosoxidens.   -- ID following, appreciate their assistance.             Mediastinal collection    Bilateral pleural effusion     -- ill-defined mediastinal collection with mediastinal abscess, loculated pleural  effusion, and complex pleural effusion are among the possible differentials.  -- GS and culture from EBUS at OSH grew MRSA + Achromobacter.   -- continue broad spectrum abx including vancomycin and Meropenem.    -- consider CTS consultation.             Hematology   History of DVT of lower extremity    -- hx of DVT in left lower extremity. Completed 3 month course of Eliquis late 2017.         Endocrine   Impaired glucose tolerance    -- likely due to being on steroids chronically.   -- a1c 5.2, good poct glucose. d/c LDSSI.   -- initiate TF / diet today.        GI   Abnormal LFTs    -- likely due to drug adverse effect suspected from voriconazole. Switched to isavuconazonium in setting of conjunctival swelling now with improving LFT's.         GERD (gastroesophageal reflux disease)    -- resume ppi             Critical Care Daily Checklist:    A: Awake: RASS Goal/Actual Goal:    Actual: Ricks Agitation Sedation Scale (RASS): Alert and calm   B: Spontaneous Breathing Trial Performed?     C: SAT & SBT Coordinated?  n/a                      D: Delirium: CAM-ICU Overall CAM-ICU: Negative   E: Early Mobility Performed? yes   F: Feeding Goal: Goals: tolerate TF at Goal rate  Status: Nutrition Goal Status: new   Current Diet Order   Procedures    Diet NPO      AS: Analgesia/Sedation prn   T: Thromboembolic Prophylaxis Heparin gtt   H: HOB > 300 yes   U: Stress Ulcer Prophylaxis (if needed) ppi   G: Glucose Control no   B: Bowel Function     I: Indwelling Catheter (Lines & Olivares) Necessity yes   D: De-escalation of Antimicrobials/Pharmacotherapies     Plan for the day/ETD Continue supportive care    Code Status:  Family/Goals of Care: Full Code           Hien Pressley MD  Critical Care Medicine  [facility]

## 2018-05-28 PROBLEM — H11.423: Status: ACTIVE | Noted: 2018-01-01

## 2018-05-28 PROBLEM — R79.89 ABNORMAL LFTS: Status: ACTIVE | Noted: 2018-01-01

## 2018-05-28 NOTE — NURSING
Patient was currently in BIPAP 100%, asking me to clean her because she said she had bowel motion. Upon checking, she is clean, she did not pass. I explained to her probably it was just a gas. She asked for a bed pan and for oral care. I refused to do the oral care, I told her she may desaturate if I remove the BIPAP and it can be done if the RT is around, who are currently doing the hand over report. While talking to her her saturation decreased from 100% to 90's down to 70's, we called the RT. The RT manually bag her but her saturation remained at 70's and further decreased to 65% and then the team arrived. Patient is still awake, conscious and coherent. The team explained to patient and to her  about the need for intubation.

## 2018-05-28 NOTE — SUBJECTIVE & OBJECTIVE
Interval History:     Intubated 5/28 AM for worsening hypoxia. Was started on levophed gtt post intubation. Bronch performed. Otherwise, afebrile over night, no pressor requirements over night.      albuterol-ipratropium  3 mL Nebulization Q4H    baclofen  5 mg Oral TID    chlorhexidine  15 mL Mouth/Throat BID    dapsone  100 mg Oral Daily    escitalopram oxalate  20 mg Oral Daily    [START ON 5/29/2018] famotidine (PF)  20 mg Intravenous BID    guaiFENesin  1,200 mg Oral BID    custom IVPB builder  372 mg Intravenous Q8H    Followed by    [START ON 5/29/2018] custom IVPB builder  372 mg Intravenous Q24H    meropenem (MERREM) IVPB  2 g Intravenous Q8H    methylPREDNISolone sodium succinate  80 mg Intravenous Q12H    montelukast  10 mg Oral Daily    pravastatin  80 mg Oral Daily    sodium chloride 3%  4 mL Nebulization BID    vancomycin (VANCOCIN) IVPB  1,250 mg Intravenous Q12H    verapamil  80 mg Oral TID       fentanyl Stopped (05/28/18 1045)    heparin (porcine) in D5W 7.987 Units/kg/hr (05/28/18 1300)    norepinephrine bitartrate-D5W 0.15 mcg/kg/min (05/28/18 1300)    propofol           Review of Systems   Reason unable to perform ROS: intubation.     Objective:     Vital Signs (Most Recent):  Temp: 98.3 °F (36.8 °C) (05/28/18 1100)  Pulse: 79 (05/28/18 1300)  Resp: 14 (05/28/18 1300)  BP: 106/67 (05/28/18 1300)  SpO2: 100 % (05/28/18 1300) Vital Signs (24h Range):  Temp:  [97.1 °F (36.2 °C)-98.3 °F (36.8 °C)] 98.3 °F (36.8 °C)  Pulse:  [] 79  Resp:  [14-48] 14  SpO2:  [65 %-100 %] 100 %  BP: ()/() 106/67     Weight: 81.4 kg (179 lb 7.3 oz)  Body mass index is 32.82 kg/m².    Estimated Creatinine Clearance: 89.7 mL/min (based on SCr of 0.7 mg/dL).    Physical Exam   Constitutional: No distress.   HENT:   Head: Normocephalic and atraumatic.   On BiPAP, cushingoid features   Eyes: EOM are normal.   Left conjunctival bleb   Neck: Neck supple.   Cardiovascular: Normal rate and  regular rhythm.    Pulmonary/Chest: Effort normal. No respiratory distress. She has no wheezes.   Intubated, Transmitted vent sounds   Abdominal: Soft. She exhibits no distension.   Musculoskeletal: She exhibits no edema.   Neurological:   Fully sedated   Skin: Skin is warm. No rash noted. She is not diaphoretic. No erythema. There is pallor.       Significant Labs:   Blood Culture:   Recent Labs  Lab 05/25/18  2200 05/25/18  2213   LABBLOO No Growth to date  No Growth to date  No Growth to date No Growth to date  No Growth to date  No Growth to date     Respiratory Culture: No results for input(s): GSRESP, RESPIRATORYC in the last 4320 hours.  Urine Culture: No results for input(s): LABURIN in the last 4320 hours.  Urine Studies:   Recent Labs  Lab 05/25/18  2356   COLORU Tamy   APPEARANCEUA Cloudy*   PHUR 5.0   SPECGRAV >=1.030*   PROTEINUA 1+*   GLUCUA Negative   KETONESU Negative   BILIRUBINUA Negative   OCCULTUA Negative   NITRITE Negative   UROBILINOGEN Negative   LEUKOCYTESUR Trace*   RBCUA 3   WBCUA 19*   BACTERIA Moderate*   SQUAMEPITHEL 3   HYALINECASTS 65*       Significant Imaging:     CT chest -pending

## 2018-05-28 NOTE — PROCEDURES - EMERGENCY DEPT.
This is an eICu summary note. Called into patient's room via eElert for documentation of RSI. Dr. Welch at bedside with RT and RNsx2. Patient bagged with 100%O2, given 150mg Propofol and 140mg Succinylcholine given per MD orders at 0751, patient successfully intubated at 0752 with 7.0 OETT at 23cm at teeth. Positive color change, bilateral breath sounds auscultated. 0801 patient became hypotensive 57/36 (42); 2mg Christian bump given per MD orders. 0804 BP recovered to 88/50. See vital flow sheet for details. Will continue to monitor.

## 2018-05-28 NOTE — PROCEDURES
Bronchoscopy Procedure Note      Date of Procedure: 5/28/18    Pre-op Diagnosis: Mucous plugging, ABPA    Post-op Diagnosis: Mucous plugging, ABPA    Physicians: Deyanira Kothari MD (fellow) & MD Chandrakant (staff)    Anesthesia:   Propofol and succinylcholine used during RSI    Operation: Flexible fiberoptic bronchoscopy, diagnostic and therapeutic    Specimen: BAL Lingula In: 60 cc Out: 35 cc    Estimated Blood Loss: Minimal      Consent:   The risks, benefits, complications, treatment options and expected outcomes were discussed with the patient.  The possibilities of reaction to medication, pulmonary aspiration, pneumothorax, bleeding, failure to diagnose her condition, and creating a complication requiring transfusion or operation were discussed with the patient who freely signed the consent.      Description of Procedure:  The patient was seen in ICU. The patient was identified as Kamla Coulter and the procedure verified as Flexible Fiberoptic Bronchoscopy.  A Time Out was conducted, and the above information confirmed.     The bronchoscope was passed through the ETT. The scope was then passed into the trachea.  Careful inspection of the tracheal lumen was accomplished. The scope was sequentially passed into the left main and then left upper and lower bronchi and segmental bronchi. The scope was then withdrawn and advanced into the right main bronchus and then into the RUL, RML, and RLL bronchi and segmental bronchi. There was thick secretions noted bilaterally, though left>right.    BAL was done in the lingula.    The scope was then withdrawn, and the patient remained in the ICU with no immediate complications.    Deyanira Kothari MD  [facility] Fellow

## 2018-05-28 NOTE — ANESTHESIA PROCEDURE NOTES
Intubation    Diagnosis: hypoxic respiratory failure  Patient location during procedure: ICU  Procedure start time: 5/28/2018 7:51 AM  Timeout: 5/28/2018 7:50 AM  Procedure end time: 5/28/2018 7:53 AM  Staffing  Anesthesiologist: DARVIN MORIN  Resident/CRNA: DAT CAMPA  Performed: resident/CRNA   Anesthesiologist was present at the time of the procedure.  Preanesthetic Checklist  Completed: patient identified, site marked, surgical consent, pre-op evaluation, timeout performed, IV checked, risks and benefits discussed, monitors and equipment checked and anesthesia consent given  Intubation  Indication: respiratory failure, hypoxemia  Pre-oxygenation. Induction: intravenous, mask ventilation: easy mask.  Intubation: postinduction, Nancy 3.  Endotracheal Tube: oral, 7.0 mm ID, cuffed (inflated to minimal occlusive pressure)  Attempts: 1, Grade I - full view of cords  Complicating Factors: none  Tube secured at 23 cm at the teeth.  Findings post-intubation: bilateral breath sounds, positive ETCO2, atraumatic / condition of teeth unchanged  Position Confirmation: auscultation

## 2018-05-28 NOTE — PLAN OF CARE
Problem: Patient Care Overview  Goal: Plan of Care Review  Outcome: Ongoing (interventions implemented as appropriate)  See vital signs and assessments in flowsheets. See below for updates on today's progress.     Pulmonary: Requires continuous BIPAP with high FIO2 (%) due to episodes of desaturation at FIO2 of 50%.      Cardiovascular: Sinus tachycardia most of the time (HR = 115-127). -180's and MAP greater that 65mmHg    Neurological: Oriented X 4    Gastrointestinal: Requires NPO due to fully dependent to BIPAP    Genitourinary: Olivares catheter in place. Lasix 60mg IVP was given.    Endocrine: Blood glucose monitoring    Integumentary/Other: Bruises and skin tears all over the body. Pressure ulcer and DTI in the buttocks    Infusions: Heparin infusion. Anti X-A 6hours monitoring.    Extra doses of xanax were given as per provider to relieve patient from anxiety, help her sleep or relax and breath easily with the BIPAP. Respiratory treatments were provided by RT and suctioned her nasally and orally with catheter to remove any thick sputum she may have on her throat. Encouraged and constantly re-assurance done by the team.  Plan of care communicated and reviewed with Kamla Coulter and family. All concerns addressed. Will continue to monitor.

## 2018-05-28 NOTE — ASSESSMENT & PLAN NOTE
-- MRSA grew initially in urine, then blood, and finally in mediastinal collection.   -- SHEILA performed 5/8/2018 was negative for endocarditis.  -- hx of prolonged immunosuppression on Prednisone and Xolair predisposing patient to fungal infection. Patient with recent hx of ABPA completed course of voriconazole and steroid taper.   -- continue IV Vancomycin. Repeat cultures here NGTD. Last sterile blood culture per OSH was 5/18/2018. Repeat blood cultures here with NGTD.    -- s/p voriconazole and steroid taper end of 2017.   -- bronchial washing on 5/22/ grew Sta  -- switch Zosyn to meropenem for 7 day covering for Achromobacter xylogens.   -- ID following, appreciate their assistance.

## 2018-05-28 NOTE — PLAN OF CARE
Problem: Patient Care Overview  Goal: Plan of Care Review  Outcome: Ongoing (interventions implemented as appropriate)  Pt intubated shortly after shift change and emergently broched; copious secretions removed. Sat improved to 100 after bronch. Chest CT with contrast completed this afternoon. Plan to extubate in am.

## 2018-05-28 NOTE — ASSESSMENT & PLAN NOTE
Bilateral pleural effusion     -- ill-defined mediastinal collection with mediastinal abscess, loculated pleural effusion, and complex pleural effusion are among the possible differentials.  -- GS and culture during EBUS with bronchial washing at OSH grew MRSA + Achromobacter.   -- continue broad spectrum abx including vancomycin and Meropenem.    -- obtain updated CT chest and consult CTS appreciate their assistance.    -- EUS via esophageal approach remains an option for sampling/draining the collection.

## 2018-05-28 NOTE — SIGNIFICANT EVENT
Called by RT requesting for a stat CXR for acute hypoxia and absent breath sounds over the left lung. Presented urgently to patient's bedside. Patient currently sating % on 100% FiO2 on 16/8 of BiPAP. Reportedly, two hours ago patient had mechanical cough assist device applied along with a breathing treatment with duoneb and 3% NS neb with the use of Aerobika. Following this treatment the patient requested to be placed on BiPAP after being on 8L of HFNC. BiPAP settings initially were 50% FiO2 and 16/8.     On my exam, patient appears to be in respiratory distress, mild wheezing appreciated on right lung and near absent breath sounds over the left lung.     A: rule out mucus plug of left mainstem bronchus.  P: stat CXR and, when appropriate, perform deep suctioning.      JOSE J Alston  PGY-3  Internal Medicine  Pager: [phone]

## 2018-05-28 NOTE — ASSESSMENT & PLAN NOTE
- Patient with mediastinal mass - possibly source of persistent MRSA bacteremia, need drainage for ultimate source control.

## 2018-05-28 NOTE — SUBJECTIVE & OBJECTIVE
Interval History/Significant Events:   Worsening hypoxia overnight with resolution following breathing treatments.   This am with severe hypoxia in the 70's requiring intubation with emergent bronchoscopy revealing mucus plugging of left main bronchial system with suctioning leading to improved oxygenation.     Review of Systems   Constitutional: no fever or chills  ENT: no nasal congestion or sore throat  Respiratory: + weak cough + shortness of breath  Cardiovascular: no chest pain or palpitations  Gastrointestinal: no nausea or vomiting, + constipation  Genitourinary: no hematuria or dysuria  Integument/Breast: no rash or pruritis  Hematologic/Lymphatic: no easy bruising or lymphadenopathy  Musculoskeletal: no arthralgias or myalgias  Neurological: no seizures or tremors  Endocrine: no heat or cold intolerance    Objective:     Vital Signs (Most Recent):  Temp: 98.3 °F (36.8 °C) (05/28/18 0700)  Pulse: 107 (05/28/18 0828)  Resp: (!) 41 (05/28/18 0828)  BP: (!) 159/95 (05/28/18 0828)  SpO2: (!) 89 % (05/28/18 0828) Vital Signs (24h Range):  Temp:  [97.1 °F (36.2 °C)-98.3 °F (36.8 °C)] 98.3 °F (36.8 °C)  Pulse:  [] 107  Resp:  [14-67] 41  SpO2:  [65 %-100 %] 89 %  BP: ()/() 159/95   Weight: 81.4 kg (179 lb 7.3 oz)  Body mass index is 32.82 kg/m².      Intake/Output Summary (Last 24 hours) at 05/28/18 0905  Last data filed at 05/28/18 0800   Gross per 24 hour   Intake           822.13 ml   Output             2050 ml   Net         -1227.87 ml       Physical Exam    General: obese lady with Cushingoid facies, intubated and sedated  Eyes: chemosis with lateral conjunctival edema b/l, PERRL. EOMI.   Neck: supple, symmetrical, trachea midline, JVD difficult to appreciate on exam.  HENT: NG tube in place.  NG in place.  Cardiovascular: Heart: tachycardic, regular rhythm, S1, S2 normal, no murmur, click, rub or gallop.  Lungs: inspiratory and expiratory wheezes present.   Chest Wall: no  tenderness.  Abdomen/Rectal: Abdomen: + increased abdominal girth with no appreciable distention.   No BS. No masses. No TTP.   Extremities: 2+ pitting edema up to lower tibia, no redness or tenderness in the calves or thighs. Pulses: 2+ and symmetric  Skin: Skin color, texture, turgor normal. No rashes or lesions  Musculoskeletal: full range of motion of joints.   Lymph Nodes: No cervical or supraclavicular adenopathy  Psych/Behavioral: intubated and sedated.      Vents:  Vent Mode: A/C (05/28/18 0806)  Ventilator Initiated: Yes (05/28/18 0806)  Set Rate: 14 bmp (05/28/18 0806)  Vt Set: 340 mL (05/28/18 0806)  Pressure Support: 0 cmH20 (05/28/18 0806)  PEEP/CPAP: 8 cmH20 (05/28/18 0806)  Oxygen Concentration (%): 100 (05/28/18 0828)  Peak Airway Pressure: 36 cmH2O (05/28/18 0806)  Plateau Pressure: 0 cmH20 (05/28/18 0806)  Total Ve: 5.02 mL (05/28/18 0806)  Lines/Drains/Airways     Peripherally Inserted Central Catheter Line                 PICC Double Lumen 05/24/18 left brachial 4 days          Drain                 NG/OG Tube 05/24/18 0000 4 days         Urethral Catheter 05/24/18 16 Fr. 4 days          Airway                 Airway - Non-Surgical 05/28/18 0751 Other (Comment) less than 1 day          Peripheral Intravenous Line                 Peripheral IV - Single Lumen 05/26/18 0200 Left Forearm 2 days              Significant Labs:    CBC/Anemia Profile:    Recent Labs  Lab 05/27/18  0104 05/28/18  0238   WBC 12.57 13.12*  13.12*   HGB 8.5* 7.5*  7.5*   HCT 27.0* 23.8*  23.8*    205  205   MCV 96 97  97   RDW 16.9* 16.8*  16.8*        Chemistries:    Recent Labs  Lab 05/27/18  0104 05/27/18  0900 05/28/18  0238     --  143   K 3.0* 5.0 3.5   CL 91*  --  91*   CO2 40*  --  39*   BUN 16  --  22*   CREATININE 0.7  --  0.7   CALCIUM 7.6*  --  8.0*   ALBUMIN 2.1*  --  2.2*   PROT 4.8*  --  4.8*   BILITOT 0.5  --  0.6   ALKPHOS 432*  --  393*   *  --  386*   *  --  64*   MG 1.6  2.7*  --    PHOS 2.8  --   --

## 2018-05-28 NOTE — ASSESSMENT & PLAN NOTE
Hypotension  -- hold verapamil 240mg od for current hypotension following intubation and being on sedation and high PEEP.

## 2018-05-28 NOTE — NURSING
Patient desaturated as low as 60% in the monitor, HR went up to 120's and SBP increased to 180's and RR gone up to 30's. RT was called and increased the FIO2 to 100%. Dr. Stanford was called and assessed the patient, he ordered for stat chest x-ray. While waiting for the x-ray tech the FIO2 was slowly being weaned down. Portable chest x-ray done, seen at the bedside by the doctor. No acute changes from the previous one. FIO2 decreased back to 50% and respiratory treatment was given via BiPAP.

## 2018-05-28 NOTE — PROGRESS NOTES
[facility]  Infectious Disease  Progress Note    Patient Name: Kamla Coulter  MRN: [id number]  Admission Date: 5/25/2018  Length of Stay: 3 days  Attending Physician: Mario Stallings MD  Primary Care Provider: Molina Alexandre MD    Isolation Status: Contact  Assessment/Plan:      MRSA bacteremia    57-year-old female  With hx of  Severe persistent asthma on omalizumab, prolonged steroid use. has hx of ABPA and Bronchiectasis. Prior Voriconazole intolerance that lead to vision loss. Now admitted with  MRSA bacteremia, bacteriuria, pneumonia; SHEILA negative.  Mediastinal mass s/p EUS with biopsy - no malignancy identified, no cultures sent.   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    Recommendations:    - Continue vancomycin for MRSA bacteremia, goal trough 15-20, will need at least 6 weeks  - meropenem for coverage of Achromobacter, plan for 7 day course  - Continue isavuconazonium for ABPA- maintenance dose ordered   - Follow-up blood cultures  - follow up repeat CT chest  - follow up bronchoscopy results  - would  Appreciate IR/ CTS recs regarding drainage of mediastinal mass / abscess  - Start dapsone for PJP prophylaxis given prolonged steroid use           Mediastinal collection    - Patient with mediastinal mass - possibly source of persistent MRSA bacteremia, need drainage for ultimate source control.              Anticipated Disposition: pending clinical improvement      Thank you for your consult. I will follow-up with patient. Please contact us if you have any additional questions.    Delia Salcedo MD  Infectious Disease  [facility]    Subjective:     Principal Problem:Acute hypoxemic respiratory failure    HPI: Case of 57  y/o female with PMHx athma on steroids and Xolair c/w ABPA and bronchiectasis. Transferred from [facility]. Initially presented on 5/2/18 with c/c SOB, low grade fevers. Course complicated with MRSA bacteremia/pneumonia and mediastinal collection positive for Achromobacter sp and MRSA. SHEILA done at OSH negative for IE. Recently extubated on 5/25/18 to BiPAP and transferred to [facility] for higher level of care ID consulted for antibiotic recommendations. Patient had previosuly being followed by Dr Loco for ABPA had discontinued voriconazole 2ry to vision loss that had not returned to baseline.   Interval History:     Intubated 5/28 AM for worsening hypoxia. Was started on levophed gtt post intubation. Bronch performed. Otherwise, afebrile over night, no pressor requirements over night.      albuterol-ipratropium  3 mL Nebulization Q4H    baclofen  5 mg Oral TID    chlorhexidine  15 mL Mouth/Throat BID    dapsone  100 mg Oral Daily    escitalopram oxalate  20 mg Oral Daily    [START ON 5/29/2018] famotidine (PF)  20 mg Intravenous BID    guaiFENesin  1,200 mg Oral BID    custom IVPB builder  372 mg Intravenous Q8H    Followed by    [START ON 5/29/2018] custom IVPB builder  372 mg Intravenous Q24H    meropenem (MERREM) IVPB  2 g Intravenous Q8H    methylPREDNISolone sodium succinate  80 mg Intravenous Q12H    montelukast  10 mg Oral Daily    pravastatin  80 mg Oral Daily    sodium chloride 3%  4 mL Nebulization BID    vancomycin (VANCOCIN) IVPB  1,250 mg Intravenous Q12H    verapamil  80 mg Oral TID       fentanyl Stopped (05/28/18 1045)    heparin (porcine) in D5W 7.987 Units/kg/hr (05/28/18 1300)    norepinephrine bitartrate-D5W 0.15 mcg/kg/min (05/28/18 1300)    propofol           Review of Systems   Reason unable to perform ROS: intubation.     Objective:     Vital Signs (Most Recent):  Temp: 98.3 °F (36.8 °C) (05/28/18 1100)  Pulse: 79 (05/28/18 1300)  Resp: 14 (05/28/18 1300)  BP: 106/67  (05/28/18 1300)  SpO2: 100 % (05/28/18 1300) Vital Signs (24h Range):  Temp:  [97.1 °F (36.2 °C)-98.3 °F (36.8 °C)] 98.3 °F (36.8 °C)  Pulse:  [] 79  Resp:  [14-48] 14  SpO2:  [65 %-100 %] 100 %  BP: ()/() 106/67     Weight: 81.4 kg (179 lb 7.3 oz)  Body mass index is 32.82 kg/m².    Estimated Creatinine Clearance: 89.7 mL/min (based on SCr of 0.7 mg/dL).    Physical Exam   Constitutional: No distress.   HENT:   Head: Normocephalic and atraumatic.   On BiPAP, cushingoid features   Eyes: EOM are normal.   Left conjunctival bleb   Neck: Neck supple.   Cardiovascular: Normal rate and regular rhythm.    Pulmonary/Chest: Effort normal. No respiratory distress. She has no wheezes.   Intubated, Transmitted vent sounds   Abdominal: Soft. She exhibits no distension.   Musculoskeletal: She exhibits no edema.   Neurological:   Fully sedated   Skin: Skin is warm. No rash noted. She is not diaphoretic. No erythema. There is pallor.       Significant Labs:   Blood Culture:   Recent Labs  Lab 05/25/18  2200 05/25/18  2213   LABBLOO No Growth to date  No Growth to date  No Growth to date No Growth to date  No Growth to date  No Growth to date     Respiratory Culture: No results for input(s): GSRESP, RESPIRATORYC in the last 4320 hours.  Urine Culture: No results for input(s): LABURIN in the last 4320 hours.  Urine Studies:   Recent Labs  Lab 05/25/18  2356   COLORU Tamy   APPEARANCEUA Cloudy*   PHUR 5.0   SPECGRAV >=1.030*   PROTEINUA 1+*   GLUCUA Negative   KETONESU Negative   BILIRUBINUA Negative   OCCULTUA Negative   NITRITE Negative   UROBILINOGEN Negative   LEUKOCYTESUR Trace*   RBCUA 3   WBCUA 19*   BACTERIA Moderate*   SQUAMEPITHEL 3   HYALINECASTS 65*       Significant Imaging:     CT chest -pending

## 2018-05-28 NOTE — ASSESSMENT & PLAN NOTE
-- likely due to being on steroids chronically.   -- a1c 5.2, good poct glucose. d/c LDSSI.   -- initiate TF / diet today with impact peptide 1.5, appreciate nutrition rec's.

## 2018-05-28 NOTE — PROGRESS NOTES
[facility]  Critical Care Medicine  Progress Note    Patient Name: Kamla Coulter  MRN: [id number]  Admission Date: 5/25/2018  Hospital Length of Stay: 3 days  Code Status: Full Code  Attending Provider: Mario Stallings MD  Primary Care Provider: Molina Alexandre MD   Principal Problem: Acute hypoxemic respiratory failure    Subjective:     HPI:    This is a 57 year old female with hx of severe persistent asthma on Xolair, prolonged immunosuppression on steroids, ABPA,bronchiectasis, HTN, DVT, and GERD who is transferred from [facility] for higher level of care. She initially presented to the OSH on May second for progressive shortness of breath, wheezing and orthopnea over a period of 2-3 days associated with low-grade fever. She was admitted to the hospital medicine floor and started on IV solumedrol 40mg bid and was placed on BiPAP. She was weaned off to NC on the second day of admission. Blood cultures from admission grew MRSA bacteremia and patient was started on vancomycin. Urine and sputum cultures both grew MRSA. The patient had persistent MRSA bacteremia despite being on vancomycin and a SHEILA was done on 5/8/2018 that was negative for endocarditis and revealed normal EF%. A bone scan on 5/10/2018 was similarly negative for an infection nidus. On 5/10/2018 the patient developed an acute hypoxic respiratory failure that did not improve promptly on BiPAP with an FiO2 of 100% and the patient was moved to the ICU and started empirically on therapeutic Lovenox. A CTA done on the same day revealed no PE but was notable for worsening bilateral pleural effusion and a non-specific infiltrate/collection around the mid-esophagus. The collection was thought to be a loculated pleural effusion. IV solumedrol was increased to 80mg twice daily and Lovenox was decreased to ppx dosing. By 5/12/2018 the patient's respiratory failure had improved and patient was weaned off of BiPAP to  nasal cannula. Sputum cultures collected on 5/13/2018 showed budding yeast and the patient was started on voriconazole. On 5/15/2018 the patient had worsening hypoxia and increased work of breathing and a CXR showed HAP and Ceftaroline was added to vancomycin. On 5/16/2018, repeat blood cultures showed persistent MRSA bacteremia and an Indium scan performed on 5/18/2018 revealed significant uptake by a collection located adjacent to the mid-esophagus. The patient was electively intubated on 5/22/2018 and an EGD revealed a whitish plaque at the proximal esophagus that was biopsied and resulted negative for malignant changes. Similarly, on the same day, an EBUS was performed and showed fluid collection posterior to the distal trachea that was better assessed through 5 FNA passes. Cultures from the EBUS grew MRSA + Alcaligenes/Achromobcater Xylosoxidens. However, FNA was negative for any malignancies. The patient was extubated the same day. Two days later (5/24/2018), the patient developed an acute decompensated respiratory failure with oxygen saturations dropping to the 60's and the patient was emergently intubated. CXR showed left lower lobe atelectasis. An emergent bronchoscopy revealed a mucus plug in the left mainstem bronchus. The mucus plug was brown, thick, and difficult to suction raising suspicion for Aspergillus infection. Following mucus plug disimpaction, the patient's respiratory status recovered immediately and the patient was extubated the next morning (5/25/2018). Prior to that a repeat CT chest showed persistent collection around the mid-esophagus. At this point, it was decided that the patient was better served at a higher level of care facility and the patient was transferred to [facility]. The patient arrived on BiPAP 10/5 and a 45% FiO2 with good oxygen saturation.       Hospital/ICU Course:  Admitted as a transfer from [facility] on 5/25/2018 for higher level of care. Patient required BiPAP on  admission but was able to wean to HFNC on second day of hospitalization. ID was consulted and had made changes to antimicrobials with continuing vancomycin, switching voriconazole to isavuconazonium and zosyn to meropenem. Blood cultures showed NGTD.  05/28/2018 Worsening respiratory status requiring intubation morning of 5/28/2018 for oxygen saturation in the lower 70's with emergent bronchoscopy done following intubation revealing left system mucus plugging. Following suctioning of mucus plugging oxygen saturation had finally improved. Repeating CT chest wo contrast and consulting CTS.    Interval History/Significant Events:   Worsening hypoxia overnight with resolution following breathing treatments.   This am with severe hypoxia in the 70's requiring intubation with emergent bronchoscopy revealing mucus plugging of left main bronchial system with suctioning leading to improved oxygenation.     Review of Systems   Constitutional: no fever or chills  ENT: no nasal congestion or sore throat  Respiratory: + weak cough + shortness of breath  Cardiovascular: no chest pain or palpitations  Gastrointestinal: no nausea or vomiting, + constipation  Genitourinary: no hematuria or dysuria  Integument/Breast: no rash or pruritis  Hematologic/Lymphatic: no easy bruising or lymphadenopathy  Musculoskeletal: no arthralgias or myalgias  Neurological: no seizures or tremors  Endocrine: no heat or cold intolerance    Objective:     Vital Signs (Most Recent):  Temp: 98.3 °F (36.8 °C) (05/28/18 0700)  Pulse: 107 (05/28/18 0828)  Resp: (!) 41 (05/28/18 0828)  BP: (!) 159/95 (05/28/18 0828)  SpO2: (!) 89 % (05/28/18 0828) Vital Signs (24h Range):  Temp:  [97.1 °F (36.2 °C)-98.3 °F (36.8 °C)] 98.3 °F (36.8 °C)  Pulse:  [] 107  Resp:  [14-67] 41  SpO2:  [65 %-100 %] 89 %  BP: ()/() 159/95   Weight: 81.4 kg (179 lb 7.3 oz)  Body mass index is 32.82 kg/m².      Intake/Output Summary (Last 24 hours) at 05/28/18 0905  Last  data filed at 05/28/18 0800   Gross per 24 hour   Intake           822.13 ml   Output             2050 ml   Net         -1227.87 ml       Physical Exam    General: obese lady with Cushingoid facies, intubated and sedated  Eyes: chemosis with lateral conjunctival edema b/l, PERRL. EOMI.   Neck: supple, symmetrical, trachea midline, JVD difficult to appreciate on exam.  HENT: NG tube in place.  NG in place.  Cardiovascular: Heart: tachycardic, regular rhythm, S1, S2 normal, no murmur, click, rub or gallop.  Lungs: inspiratory and expiratory wheezes present.   Chest Wall: no tenderness.  Abdomen/Rectal: Abdomen: + increased abdominal girth with no appreciable distention.   No BS. No masses. No TTP.   Extremities: 2+ pitting edema up to lower tibia, no redness or tenderness in the calves or thighs. Pulses: 2+ and symmetric  Skin: Skin color, texture, turgor normal. No rashes or lesions  Musculoskeletal: full range of motion of joints.   Lymph Nodes: No cervical or supraclavicular adenopathy  Psych/Behavioral: intubated and sedated.      Vents:  Vent Mode: A/C (05/28/18 0806)  Ventilator Initiated: Yes (05/28/18 0806)  Set Rate: 14 bmp (05/28/18 0806)  Vt Set: 340 mL (05/28/18 0806)  Pressure Support: 0 cmH20 (05/28/18 0806)  PEEP/CPAP: 8 cmH20 (05/28/18 0806)  Oxygen Concentration (%): 100 (05/28/18 0828)  Peak Airway Pressure: 36 cmH2O (05/28/18 0806)  Plateau Pressure: 0 cmH20 (05/28/18 0806)  Total Ve: 5.02 mL (05/28/18 0806)  Lines/Drains/Airways     Peripherally Inserted Central Catheter Line                 PICC Double Lumen 05/24/18 left brachial 4 days          Drain                 NG/OG Tube 05/24/18 0000 4 days         Urethral Catheter 05/24/18 16 Fr. 4 days          Airway                 Airway - Non-Surgical 05/28/18 0751 Other (Comment) less than 1 day          Peripheral Intravenous Line                 Peripheral IV - Single Lumen 05/26/18 0200 Left Forearm 2 days              Significant  Labs:    CBC/Anemia Profile:    Recent Labs  Lab 05/27/18  0104 05/28/18  0238   WBC 12.57 13.12*  13.12*   HGB 8.5* 7.5*  7.5*   HCT 27.0* 23.8*  23.8*    205  205   MCV 96 97  97   RDW 16.9* 16.8*  16.8*        Chemistries:    Recent Labs  Lab 05/27/18  0104 05/27/18  0900 05/28/18  0238     --  143   K 3.0* 5.0 3.5   CL 91*  --  91*   CO2 40*  --  39*   BUN 16  --  22*   CREATININE 0.7  --  0.7   CALCIUM 7.6*  --  8.0*   ALBUMIN 2.1*  --  2.2*   PROT 4.8*  --  4.8*   BILITOT 0.5  --  0.6   ALKPHOS 432*  --  393*   *  --  386*   *  --  64*   MG 1.6 2.7*  --    PHOS 2.8  --   --          Assessment/Plan:     Ophtho   Conjunctival edema of both eyes    -- likely due to voriconazole currently improving after switching to isavuconazonium.         Pulmonary   * Acute hypoxemic respiratory failure    -- likely multifactorial with respiratory secretion, asthma exacerbation, HAP, and volume overload all likely to be contributing. Ddx of PE considered less likely in setting of recent CTA on 5/8/2018 being negative and CT chest with contrast on 5/25/2018 with grossly normal pulmonary vasculature in addition to patient being on appropriate dvt ppx make the dx of PE less likely.  -- continue broad spectrum abx with vancomycin and meropenem covering for MRSA and Achromobacter xylogens in setting of HAP and + cultures from bronchial wash. Continue Solumedrol 60mg bid and consider increasing frequency.  -- duonebs [id number] + 3% NS + cough assist device.  -- daily lasix as needed  -- intubated on 5/28/2018 and bronchoscopy revealing mucus plugging of left main bronchial system.  -- confirming that mediastinal abscess growing MRSA and achromobacter.  -- repeating CT scan to assess for size of mediastinal abscess/collection and consulting CTS.         Severe persistent asthma    -- on Xolair and prednisone taper at home.   -- mx as above per hypoxic resp failure.  -- continue IV solumedrol but  increase to 80mg bid.        Cardiac/Vascular   Mixed hyperlipidemia    -- continue home pravachol        Essential hypertension    Hypotension  -- hold verapamil 240mg od for current hypotension following intubation and being on sedation and high PEEP.        ID   ABPA (allergic bronchopulmonary aspergillosis)    -- s/p voriconazole and steroid taper treated at [facility] end of 2017.         MRSA bacteremia     -- MRSA grew initially in urine, then blood, and finally in mediastinal collection.   -- SHEILA performed 5/8/2018 was negative for endocarditis.  -- hx of prolonged immunosuppression on Prednisone and Xolair predisposing patient to fungal infection. Patient with recent hx of ABPA completed course of voriconazole and steroid taper.   -- continue IV Vancomycin. Repeat cultures here NGTD. Last sterile blood culture per OSH was 5/18/2018. Repeat blood cultures here with NGTD.    -- s/p voriconazole and steroid taper end of 2017.   -- bronchial washing on 5/22/ grew Sta  -- switch Zosyn to meropenem for 7 day covering for Achromobacter xylogens.   -- ID following, appreciate their assistance.             Mediastinal collection    Bilateral pleural effusion     -- ill-defined mediastinal collection with mediastinal abscess, loculated pleural effusion, and complex pleural effusion are among the possible differentials.  -- GS and culture during EBUS with bronchial washing at OSH grew MRSA + Achromobacter.   -- continue broad spectrum abx including vancomycin and Meropenem.    -- obtain updated CT chest and consult CTS appreciate their assistance.    -- EUS via esophageal approach remains an option for sampling/draining the collection.             Hematology   History of DVT of lower extremity    -- hx of DVT in left lower extremity. Completed 3 month course of Eliquis late 2017.         Endocrine   Impaired glucose tolerance    -- likely due to being on steroids chronically.   -- a1c 5.2, good poct glucose. d/c  LDSSI.   -- initiate TF / diet today with impact peptide 1.5, appreciate nutrition rec's.        GI   Abnormal LFTs    -- likely due to drug adverse effect suspected from voriconazole. Switched to isavuconazonium in setting of conjunctival swelling now with improving LFT's.         GERD (gastroesophageal reflux disease)    -- resume ppi             Critical Care Daily Checklist:    A: Awake: RASS Goal/Actual Goal: RASS Goal: 0-->alert and calm  Actual: Ricks Agitation Sedation Scale (RASS): Alert and calm   B: Spontaneous Breathing Trial Performed?     C: SAT & SBT Coordinated?  Not today                      D: Delirium: CAM-ICU    E: Early Mobility Performed?     F: Feeding Goal: Goals: tolerate TF at Goal rate  Status: Nutrition Goal Status: new   Current Diet Order   Procedures    Diet NPO      AS: Analgesia/Sedation propofol   T: Thromboembolic Prophylaxis Heparin gtt   H: HOB > 300 yes   U: Stress Ulcer Prophylaxis (if needed) ppi   G: Glucose Control prn   B: Bowel Function Stool Occurrence: 0   I: Indwelling Catheter (Lines & Olivares) Necessity yes   D: De-escalation of Antimicrobials/Pharmacotherapies no    Plan for the day/ETD Supportive care and mediastinal collection management following CT chest.    Code Status:  Family/Goals of Care: Full Code              Hien Pressley MD  Critical Care Medicine  [facility]

## 2018-05-28 NOTE — SUBJECTIVE & OBJECTIVE
Interval History/Significant Events:   Has a drop in H/H requiring two units of pRBC this am. Heparin held and CT abdomen ordered for occult abdo bleed. Plan for extubation today. Continuing diuresis and management of resp secretions.     Remains intubated and on minimal fentanyl. Following commands.        albuterol-ipratropium  3 mL Nebulization Q4H    baclofen  5 mg Oral TID    chlorhexidine  15 mL Mouth/Throat BID    dapsone  100 mg Oral Daily    escitalopram oxalate  20 mg Oral Daily    [START ON 5/29/2018] famotidine (PF)  20 mg Intravenous BID    guaiFENesin  1,200 mg Oral BID    custom IVPB builder  372 mg Intravenous Q8H    Followed by    [START ON 5/29/2018] custom IVPB builder  372 mg Intravenous Q24H    meropenem (MERREM) IVPB  2 g Intravenous Q8H    methylPREDNISolone sodium succinate  80 mg Intravenous Q12H    montelukast  10 mg Oral Daily    pravastatin  80 mg Oral Daily    sodium chloride 3%  4 mL Nebulization BID    vancomycin (VANCOCIN) IVPB  1,250 mg Intravenous Q12H    verapamil  80 mg Oral TID      fentanyl Stopped (05/28/18 1045)    heparin (porcine) in D5W 7.987 Units/kg/hr (05/28/18 1300)    norepinephrine bitartrate-D5W 0.15 mcg/kg/min (05/28/18 1300)    propofol       acetaminophen, ALPRAZolam, artificial tears, HYDROcodone-acetaminophen, labetalol, ondansetron, promethazine, ramelteon, sodium chloride 0.9%, sodium chloride 3%      Review of Systems   Reason unable to perform ROS: intubation      Objective:     Vital Signs (Most Recent):  Temp: 98.3 °F (36.8 °C) (05/28/18 1100)  Pulse: 79 (05/28/18 1300)  Resp: 14 (05/28/18 1300)  BP: 106/67 (05/28/18 1300)  SpO2: 100 % (05/28/18 1300) Vital Signs (24h Range):  Temp:  [97.1 °F (36.2 °C)-98.3 °F (36.8 °C)] 98.3 °F (36.8 °C)  Pulse:  [] 79  Resp:  [14-48] 14  SpO2:  [65 %-100 %] 100 %  BP: ()/() 106/67   Weight: 81.4 kg (179 lb 7.3 oz)  Body mass index is 32.82 kg/m².      Intake/Output Summary (Last 24  hours) at 05/28/18 1423  Last data filed at 05/28/18 1300   Gross per 24 hour   Intake          1598.81 ml   Output             1980 ml   Net          -381.19 ml       Physical Exam   Constitutional: No distress.   HENT:   Head: Normocephalic and atraumatic.   On BiPAP, cushingoid features   Eyes: EOM are normal.   Left conjunctival bleb   Neck: Neck supple.   Cardiovascular: Normal rate and regular rhythm.    Pulmonary/Chest: Effort normal. No respiratory distress. She has no wheezes.   Intubated, Transmitted vent sounds   Abdominal: Soft. She exhibits no distension.   Musculoskeletal: She exhibits no edema.   Neurological:   Follows commands.    Skin: Skin is warm. No rash noted. She is not diaphoretic. No erythema. There is pallor.       Vents:  Vent Mode: A/C (05/28/18 1259)  Ventilator Initiated: Yes (05/28/18 0806)  Set Rate: 14 bmp (05/28/18 1259)  Vt Set: 400 mL (05/28/18 1259)  Pressure Support: 0 cmH20 (05/28/18 1259)  PEEP/CPAP: 8 cmH20 (05/28/18 1259)  Oxygen Concentration (%): 100 (05/28/18 1300)  Peak Airway Pressure: 44 cmH2O (05/28/18 1259)  Plateau Pressure: 0 cmH20 (05/28/18 1259)  Total Ve: 17.6 mL (05/28/18 1259)  F/VT Ratio<105 (RSBI): (!) 11.15 (05/28/18 1259)  Lines/Drains/Airways     Peripherally Inserted Central Catheter Line                 PICC Double Lumen 05/24/18 left brachial 4 days          Drain                 NG/OG Tube 05/24/18 0000 4 days         Urethral Catheter 05/24/18 16 Fr. 4 days          Airway                 Airway - Non-Surgical 05/28/18 0751 Other (Comment) less than 1 day          Peripheral Intravenous Line                 Peripheral IV - Single Lumen 05/26/18 0200 Left Forearm 2 days              Significant Labs:    CBC/Anemia Profile:    Recent Labs  Lab 05/27/18  0104 05/28/18  0238   WBC 12.57 13.12*  13.12*   HGB 8.5* 7.5*  7.5*   HCT 27.0* 23.8*  23.8*    205  205   MCV 96 97  97   RDW 16.9* 16.8*  16.8*        Chemistries:    Recent Labs  Lab  05/27/18  0104 05/27/18  0900 05/28/18  0238     --  143   K 3.0* 5.0 3.5   CL 91*  --  91*   CO2 40*  --  39*   BUN 16  --  22*   CREATININE 0.7  --  0.7   CALCIUM 7.6*  --  8.0*   ALBUMIN 2.1*  --  2.2*   PROT 4.8*  --  4.8*   BILITOT 0.5  --  0.6   ALKPHOS 432*  --  393*   *  --  386*   *  --  64*   MG 1.6 2.7*  --    PHOS 2.8  --   --        Blood Culture: No results for input(s): LABBLOO in the last 48 hours.  Respiratory Culture: No results for input(s): GSRESP, RESPIRATORYC in the last 48 hours.  Urine Culture: No results for input(s): LABURIN in the last 48 hours.  Urine Studies: No results for input(s): COLORU, APPEARANCEUA, PHUR, SPECGRAV, PROTEINUA, GLUCUA, KETONESU, BILIRUBINUA, OCCULTUA, NITRITE, UROBILINOGEN, LEUKOCYTESUR, RBCUA, WBCUA, BACTERIA, SQUAMEPITHEL, HYALINECASTS in the last 48 hours.    Invalid input(s): WRIGHTSUR    Significant Imaging:    CT chest with contrast:  Bilateral pleural effusions with adjacent right and left lower lobe collapse.    Nonspecific retroperitoneal soft tissue density at the level of the midesophagus that may represent volume averaging of fluid within the esophagus.  No drainable fluid collection identified in the mediastinum.  Should prior images become available we will be pleased to review them for comparison.    Although this study is not optimal for characterization of pulmonary thromboembolism, the lingular branch of the left pulmonary artery is not well opacified with contrast, worrisome for a possible pulmonary thromboembolism.    Electronically signed by resident: Renate Watts  Date: 05/28/2018  Time: 14:15    Electronically signed by: Mari Meza MD  Date: 05/28/2018  Time: 14:41

## 2018-05-28 NOTE — PLAN OF CARE
Molina Alexandre MD  [address] [address] / [facility] [address]    [facility] - [place], [place] - [address] [facility]  [address] [facility] [address]  Phone: [phone] Fax: [phone]    [facility] [address] [place], [place] - [address] [facility]  [address] [place] [address]  Phone: [phone] Fax: [phone]    [facility] - [place], [place] - [address] [place], [place] [address] [facility] [address]  Phone: [phone] Fax: [phone]    Payor:  / Plan:  [employer] / Product Type: Government /     No future appointments.     Extended Emergency Contact Information  Primary Emergency Contact: Wolfgang Coulter   [place]  Mobile Phone: [phone]  Relation: Spouse  Secondary Emergency Contact: America Thakkar  Address: [address] [place] [address] [id number] [place] [address]  Home Phone: [phone]  Mobile Phone: [phone]  Relation: Sister        05/28/18 1109   Discharge Assessment   Assessment Type Discharge Planning Assessment   Confirmed/corrected address and phone number on facesheet? No   Assessment information obtained from? Medical Record   Expected Length of Stay (days) 5   Communicated expected length of stay with patient/caregiver no   Prior to hospitilization cognitive status: Alert/Oriented   Prior to hospitalization functional status: Independent;Needs Assistance   Current cognitive status: Coma/Sedated/Intubated   Current Functional Status: Completely Dependent   Facility Arrived From: [facility]   Lives With spouse;child(mary), dependent   Able to Return to Prior Arrangements unable to determine at this time (comments)   Is patient able to care for self after discharge? Unable to determine at this time (comments)   Who are your caregiver(s) and their phone number(s)? Wolfgang Coulter (Spouse) [phone]    Patient's perception of discharge disposition other  (comments)  (TELLO)   Readmission Within The Last 30 Days no previous admission in last 30 days   Patient currently being followed by outpatient case management? No   Patient currently receives any other outside agency services? (TELLO)   Equipment Currently Used at Home other (see comments)  (TBD)   Do you have any problems affording any of your prescribed medications? No   Is the patient taking medications as prescribed? yes   Does the patient have transportation home? Yes   Transportation Available car;family or friend will provide   Does the patient receive services at the [facility] Clinic? No   Discharge Plan A Rehab   Discharge Plan B Home with family;Home Health   Patient/Family In Agreement With Plan unable to assess   Xuan Waldrop RN, BSN, CCM  Case Management  [facility]  Ext. [phone]

## 2018-05-28 NOTE — ASSESSMENT & PLAN NOTE
57-year-old female  With hx of  Severe persistent asthma on omalizumab, prolonged steroid use. has hx of ABPA and Bronchiectasis. Prior Voriconazole intolerance that lead to vision loss. Now admitted with  MRSA bacteremia, bacteriuria, pneumonia; SHEILA negative.  Mediastinal mass s/p EUS with biopsy - no malignancy identified, no cultures sent.   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    Recommendations:    - Continue vancomycin for MRSA bacteremia, goal trough 15-20, will need at least 6 weeks  - meropenem for coverage of Achromobacter, plan for 7 day course  - Continue isavuconazonium for ABPA- maintenance dose ordered   - Follow-up blood cultures  - follow up repeat CT chest  - follow up bronchoscopy results  - would  Appreciate IR/ CTS recs regarding drainage of mediastinal mass / abscess  - Start dapsone for PJP prophylaxis given prolonged steroid use

## 2018-05-28 NOTE — PROGRESS NOTES
Patient intubated with a 7.0 ET tube 23 cm at the lip per MD order. Patient on documented ventilator settings. Will continue to monitor.

## 2018-05-28 NOTE — ASSESSMENT & PLAN NOTE
-- on Xolair and prednisone taper at home.   -- mx as above per hypoxic resp failure.  -- continue IV solumedrol but increase to 80mg bid.

## 2018-05-28 NOTE — EICU
This is an eICU note. Dr. Kothari to perform bedside bronchoscopy; Dr. Stallings at bedside with RT and RN. Time out done. Bronch started at 0811. Completed at 0826.  Patient tolerated well. See flow sheet for vitals. Will continue to monitor remotely.

## 2018-05-28 NOTE — ASSESSMENT & PLAN NOTE
-- likely multifactorial with respiratory secretion, asthma exacerbation, HAP, and volume overload all likely to be contributing. Ddx of PE considered less likely in setting of recent CTA on 5/8/2018 being negative and CT chest with contrast on 5/25/2018 with grossly normal pulmonary vasculature in addition to patient being on appropriate dvt ppx make the dx of PE less likely.  -- continue broad spectrum abx with vancomycin and meropenem covering for MRSA and Achromobacter xylogens in setting of HAP and + cultures from bronchial wash. Continue Solumedrol 60mg bid and consider increasing frequency.  -- duonebs [id number] + 3% NS + cough assist device.  -- daily lasix as needed  -- intubated on 5/28/2018 and bronchoscopy revealing mucus plugging of left main bronchial system.  -- confirming that mediastinal abscess growing MRSA and achromobacter.  -- repeating CT scan to assess for size of mediastinal abscess/collection and consulting CTS.

## 2018-05-29 PROBLEM — T17.500A MUCUS PLUGGING OF BRONCHI: Status: ACTIVE | Noted: 2018-01-01

## 2018-05-29 PROBLEM — J98.11 ATELECTASIS: Status: ACTIVE | Noted: 2018-01-01

## 2018-05-29 PROBLEM — I95.9 HYPOTENSION: Status: ACTIVE | Noted: 2018-01-01

## 2018-05-29 PROBLEM — J15.69 ACHROMOBACTER PNEUMONIA: Status: ACTIVE | Noted: 2018-01-01

## 2018-05-29 NOTE — PROCEDURES
Bronchoscopy Procedure Note        Date of Procedure: 5/29/18     Pre-op Diagnosis: Mucous plugging, ABPA     Post-op Diagnosis: Mucous plugging, ABPA     Physicians: Deyanira Kothari MD (fellow) & Agus Max MD (staff)     Anesthesia:   Propofol gtt     Operation: Flexible fiberoptic bronchoscopy, therapeutic     Specimen: none     Estimated Blood Loss: Minimal        Consent:   The risks, benefits, complications, treatment options and expected outcomes were discussed with the patient.  The possibilities of reaction to medication, pulmonary aspiration, pneumothorax, bleeding, failure to diagnose her condition, and creating a complication requiring transfusion or operation were discussed with the patient who freely signed the consent.       Description of Procedure:  The patient was seen in ICU. The patient was identified as Kamla Coulter and the procedure verified as Flexible Fiberoptic Bronchoscopy.  A Time Out was conducted, and the above information confirmed.      The bronchoscope was passed through the ETT. The scope was then passed into the trachea.  Careful inspection of the tracheal lumen was accomplished. The scope was sequentially passed into the left main and then left upper and lower bronchi and segmental bronchi. The scope was then withdrawn and advanced into the right main bronchus and then into the RUL, RML, and RLL bronchi and segmental bronchi. There was thick secretions noted bilaterally, though left lower lobe >right.        The scope was then withdrawn, and the patient remained in the ICU with no immediate complications.    Deyanira Kothari MD  [facility]

## 2018-05-29 NOTE — ASSESSMENT & PLAN NOTE
- meropenem for coverage of Achromobacter, plan for 7 day course. Today is day 4/7  - follow up final bronchoscopy results, thus far unremarkable

## 2018-05-29 NOTE — ASSESSMENT & PLAN NOTE
-- likely due to drug adverse effect suspected from voriconazole. Switched to isavuconazonium in setting of conjunctival swelling now with improving LFT's.   -- LFT's improving.

## 2018-05-29 NOTE — ASSESSMENT & PLAN NOTE
- remains intubated.   Vent Mode: A/C  Oxygen Concentration (%):  [40-60] 40  Resp Rate Total:  [14 br/min-16 br/min] 14 br/min  Vt Set:  [400 mL] 400 mL  PEEP/CPAP:  [5 cmH20-8 cmH20] 5 cmH20  Pressure Support:  [0 cmH20] 0 cmH20  Mean Airway Pressure:  [9.9 gnG04-97 cmH20] 9.9 cmH20    - per primary team

## 2018-05-29 NOTE — PLAN OF CARE
Problem: Patient Care Overview  Goal: Plan of Care Review  Outcome: Ongoing (interventions implemented as appropriate)  Levo and fentanyl gtt infusing as ordered. H/H dropped overnight (MD aware). 2 units PRBCs ordered. Urine Output minimal (MD aware). POC for STAT CT of abdomen during day shift. POC reviewed and discussed with pt. And family. All questions and concerns were addressed. Family verbalized understanding.

## 2018-05-29 NOTE — PHYSICIAN QUERY
PT Name: Kamla Coulter  MR #: [id number]    Physician Query Form -Systemic Infectious Process Clarification     Rudolph Boland RN CDS    Contact Information: [phone]    This form is a permanent document in the medical record.     Query Date: May 29, 2018     By submitting this query, we are merely seeking further clarification of documentation. Please utilize your independent clinical judgment when addressing the question(s) below.    The Medical record contains the following:     Indicators   Supporting Clinical Findings   Location in Medical Record   X HR RR BP Temp , 107, 114, 109, 111, 108    RR 33, 21, 24, 34, 27, 28    BP 58/32, 57/36, 88/50, 89/52, 68/50, 70/42, 67/42 VS Flowsheet 5/27   X Lactic Acid             Procalcitonin Lactic acid 1.2 Lab 1.2   X WBC                Bands                     CRP Wbc 12.57, 13.12, 11.33 Lab 5/27-5/29   X Culture(s) Blood cultures from admission grew MRSA bacteremia and     GS and culture during EBUS from bronchial washing at OSH grew MRSA + Achromobacter  Mediastinal abscess - MRSA  H&P 05/26      AMS, Confusion, LOC, etc.     X Organ Dysfunction / Failure Acute hypoxemic respiratory failure  H&P 05/26     X Bacteremia or Sepsis / Septic Blood cultures from admission grew MRSA bacteremia and   Mediastinal abscess - MRSA    H&P 05/26     X Known or Suspected Source of Infection documented Course complicated with MRSA bacteremia/pneumonia and mediastinal collection positive for Achromobacter sp and MRSA. Infectious Diseases Consult  5/26      (Failed) Outpatient Treatment     X Medication norepinephrine 4 mg in dextrose 5% 250 mL infusion (premix) (titrating)  :      Vancomycin 1250 mg IVPB    Phenylephrine HCl 100 mcg/ml syringe    Meropenem 2 gm IVPB    isavuconazonium sulfate 372 IVPB     Heparin 1064.2 units/hr IV continuous MAR 5/29      MAR 5/28-5/29      MAR 5/28    MAR 5/28-5/29   X Treatment GS and culture from EBUS at OSH grew MRSA + Alcaligenes  Xylosoxidans.   -- continue broad spectrum abx including vancomycin   H&P 05/26   X Other 0801 patient became hypotensive 57/36 (42); 2mg Christian bump given per MD orders    hold verapamil 240mg od for current hypotension following intubation and being on sedation and high PEEP. ED Procedure 05/28        Critical Care Medicine PN 5/28         Provider, please specify diagnosis or diagnoses associated with above clinical findings.    [ X ] Sepsis  [  ] Severe Sepsis with Acute Organ Dysfunction/Failure (please specify         organ dysfunction/failure): ___________________  [  ] Sepsis with Septic Shock  [  ] Other Infectious Disease (please specify): _________________________________  [  ] Other: __________________________________  [  ] Clinically Undetermined    Please document in your progress notes daily for the duration of treatment until resolved and include in your discharge summary.

## 2018-05-29 NOTE — ASSESSMENT & PLAN NOTE
-- on Xolair and prednisone taper at home.   -- mx as above per hypoxic resp failure.   -- continue IV solumedrol at 80mg bid.

## 2018-05-29 NOTE — ASSESSMENT & PLAN NOTE
Bilateral pleural effusion     -- ill-defined mediastinal collection with mediastinal abscess, loculated pleural effusion, and complex pleural effusion are among the possible differentials.  -- GS and culture during EBUS from bronchial washing at OSH grew MRSA + Achromobacter.   -- had 3-4 FNA passes on EBUS that showed no malignancy. Apparently, no samples were obtained from mediastinal abscess during EBUS at OSH on 5/22/2018.  -- continue broad spectrum abx including vancomycin and Meropenem treating for MRSA bacteremia and achromobacter from BAL on 5/22 and 5/24.    -- repeat CT chest with contrast on 5/28/2018 here revealed no persistent mediastinal collection.   -- abscess likely has resolved following appropriate abx treatment. No need for surgical intervention at this time.

## 2018-05-29 NOTE — ASSESSMENT & PLAN NOTE
Rule out PE   -- hx of DVT in left lower extremity. Completed 3 month course of Eliquis late 2017.   -- CT with contrast on 5/28 showed poor lingular artery filling questionable for PE. Considering overall clinical picture, PE is not thought to be a contributing factor. Heparin gtt has been held this am in setting of rapidly dropping H/H.   -- continue to follow closely.

## 2018-05-29 NOTE — SUBJECTIVE & OBJECTIVE
Interval History:     Remains intubated. On levophed at 0.06. Afebrile hbg drop this AM, to 5.7. CT abd pelvis showed no hematoma. Did show bilateral pleural effusions, and the same mass proximal to the esophagus.  Has been on heparin gtt. BAL from 5/28 with no significant amt of WBC, ngtd at this ttime.      albuterol-ipratropium  3 mL Nebulization Q4H    baclofen  5 mg Oral TID    chlorhexidine  15 mL Mouth/Throat BID    dapsone  100 mg Oral Daily    escitalopram oxalate  20 mg Oral Daily    famotidine (PF)  20 mg Intravenous BID    guaiFENesin  1,200 mg Oral BID    custom IVPB builder  372 mg Intravenous Q24H    meropenem (MERREM) IVPB  2 g Intravenous Q8H    methylPREDNISolone sodium succinate  80 mg Intravenous Q12H    montelukast  10 mg Oral Daily    polyethylene glycol  17 g Per G Tube QID    pravastatin  80 mg Oral Daily    sodium chloride 3%  4 mL Nebulization BID    sodium phosphates  1 enema Rectal Once    vancomycin (VANCOCIN) IVPB  1,250 mg Intravenous Q24H         Review of Systems   Reason unable to perform ROS: intubation      Objective:     Vital Signs (Most Recent):  Temp: 97.9 °F (36.6 °C) (05/29/18 1200)  Pulse: 89 (05/29/18 1440)  Resp: 16 (05/29/18 1440)  BP: (!) 109/56 (05/29/18 1430)  SpO2: 98 % (05/29/18 1440) Vital Signs (24h Range):  Temp:  [97.8 °F (36.6 °C)-98.2 °F (36.8 °C)] 97.9 °F (36.6 °C)  Pulse:  [] 89  Resp:  [13-46] 16  SpO2:  [93 %-100 %] 98 %  BP: ()/(39-83) 109/56     Weight: 81.4 kg (179 lb 7.3 oz)  Body mass index is 32.82 kg/m².    Estimated Creatinine Clearance: 89.7 mL/min (based on SCr of 0.7 mg/dL).    Physical Exam   Constitutional: No distress.   Intubated, sedated    HENT:   Head: Normocephalic and atraumatic.   Eyes: EOM are normal.   Neck: Neck supple.   Cardiovascular: Normal rate and regular rhythm.    Pulmonary/Chest: Effort normal. No respiratory distress. She has no wheezes.   Intubated, Transmitted vent sounds   Abdominal: Soft.  She exhibits no distension.   Musculoskeletal: She exhibits no edema.   Neurological:   Fully sedated   Skin: Skin is warm. No rash noted. She is not diaphoretic. No erythema. There is pallor.       Significant Labs:   Blood Culture:   Recent Labs  Lab 05/25/18  2200 05/25/18  2213   LABBLOO No Growth to date  No Growth to date  No Growth to date  No Growth to date No Growth to date  No Growth to date  No Growth to date  No Growth to date     CBC:   Recent Labs  Lab 05/28/18  0238 05/29/18  0402 05/29/18  0441   WBC 13.12*  13.12* 11.33 11.25   HGB 7.5*  7.5* 5.8* 5.7*   HCT 23.8*  23.8* 17.7* 18.1*     205 189 197     CMP:   Recent Labs  Lab 05/28/18  0238 05/29/18  0402    143   K 3.5 3.4*   CL 91* 97   CO2 39* 37*   * 194*   BUN 22* 27*   CREATININE 0.7 0.7   CALCIUM 8.0* 7.8*   PROT 4.8* 4.1*   ALBUMIN 2.2* 1.9*   BILITOT 0.6 0.4   ALKPHOS 393* 266*   AST 64* 36   * 205*   ANIONGAP 13 9   EGFRNONAA >60.0 >60.0     Respiratory Culture: No results for input(s): GSRESP, RESPIRATORYC in the last 4320 hours.    Significant Imaging:  CT abd 5/29  No intra-abdominal or retroperitoneal hematoma identified.    Large subcutaneous soft tissue opacity overlying the right lateral abdominal wall.  While this finding demonstrates imaging appearance most compatible with significant dependent edema, it may correlate with hematoma given this patient's history of acute drop in hemoglobin.  No focal fluid collection identified.  Correlation with physical examination findings is recommended.    Large volume stool within the rectum, likely correlating with impaction.    Multiple compression deformities as detailed above, appearing new since 08/24/2017.    Additional findings include:    -Stable bilateral pleural effusions with associated compressive atelectatic change    -Slight interval increase in pericardial fluid, remaining small in volume    -Small volume perihepatic ascites    -2.6 cm  hypodense focus in the gallbladder fossa correlating with recent ultrasound findings.  In this patient status post cholecystectomy, this finding may correlate with small residual postoperative fluid collection versus prominent residual cystic duct    -Nonspecific 1.8 cm pancreatic hypodensity.  Cystic pancreatic neoplasm cannot be excluded as described on recent ultrasound.  Further evaluation with pancreas protocol CT may be obtained as clinically indicated.

## 2018-05-29 NOTE — PT/OT/SLP PROGRESS
Occupational Therapy      Patient Name:  Kamla Coulter   MRN:  [id number]    Pt did Pass MOVE screen.   Patient not seen today secondary to pt was re-intubated yesterday with emergent bronch. Pt with critically low H and H (5&18) with scheduled CT scan this date to assess for bleeding. Nsg also reports increased agitation with stimulation. Nsg agreed to defer session this date and  OT to check status at later date.     Lois Li, PINKYR  5/29/2018

## 2018-05-29 NOTE — ASSESSMENT & PLAN NOTE
-- likely due to intubation and being on sedation + hypovolemia due to blood loss.   -- was on levophed overnight currently off pressors with good BP.

## 2018-05-29 NOTE — PROGRESS NOTES
[facility]  Critical Care Medicine  Progress Note    Patient Name: Kamla Coulter  MRN: [id number]  Admission Date: 5/25/2018  Hospital Length of Stay: 4 days  Code Status: Full Code  Attending Provider: Mario Stallings MD  Primary Care Provider: Molina Alexandre MD   Principal Problem: Acute hypoxemic respiratory failure    Subjective:     HPI:    This is a 57 year old female with hx of severe persistent asthma on Xolair, prolonged immunosuppression on steroids, ABPA,bronchiectasis, HTN, DVT, and GERD who is transferred from [facility] for higher level of care. She initially presented to the OSH on May second for progressive shortness of breath, wheezing and orthopnea over a period of 2-3 days associated with low-grade fever. She was admitted to the hospital medicine floor and started on IV solumedrol 40mg bid and was placed on BiPAP. She was weaned off to NC on the second day of admission. Blood cultures from admission grew MRSA bacteremia and patient was started on vancomycin. Urine and sputum cultures both grew MRSA. The patient had persistent MRSA bacteremia despite being on vancomycin and a SHEILA was done on 5/8/2018 that was negative for endocarditis and revealed normal EF%. A bone scan on 5/10/2018 was similarly negative for an infection nidus. On 5/10/2018 the patient developed an acute hypoxic respiratory failure that did not improve promptly on BiPAP with an FiO2 of 100% and the patient was moved to the ICU and started empirically on therapeutic Lovenox. A CTA done on the same day revealed no PE but was notable for worsening bilateral pleural effusion and a non-specific infiltrate/collection around the mid-esophagus. The collection was thought to be a loculated pleural effusion. IV solumedrol was increased to 80mg twice daily and Lovenox was decreased to ppx dosing. By 5/12/2018 the patient's respiratory failure had improved and patient was weaned off of BiPAP to  nasal cannula. Sputum cultures collected on 5/13/2018 showed budding yeast and the patient was started on voriconazole. On 5/15/2018 the patient had worsening hypoxia and increased work of breathing and a CXR showed HAP and Ceftaroline was added to vancomycin. On 5/16/2018, repeat blood cultures showed persistent MRSA bacteremia and an Indium scan performed on 5/18/2018 revealed significant uptake by a collection located adjacent to the mid-esophagus. The patient was electively intubated on 5/22/2018 and an EGD revealed a whitish plaque at the proximal esophagus that was biopsied and resulted negative for malignant changes. Similarly, on the same day, an EBUS was performed and showed fluid collection posterior to the distal trachea that was better assessed through 5 FNA passes. Cultures from the EBUS grew MRSA + Alcaligenes/Achromobcater Xylosoxidens. However, FNA was negative for any malignancies. The patient was extubated the same day. Two days later (5/24/2018), the patient developed an acute decompensated respiratory failure with oxygen saturations dropping to the 60's and the patient was emergently intubated. CXR showed left lower lobe atelectasis. An emergent bronchoscopy revealed a mucus plug in the left mainstem bronchus. The mucus plug was brown, thick, and difficult to suction raising suspicion for Aspergillus infection. Following mucus plug disimpaction, the patient's respiratory status recovered immediately and the patient was extubated the next morning (5/25/2018). Prior to that a repeat CT chest showed persistent collection around the mid-esophagus. At this point, it was decided that the patient was better served at a higher level of care facility and the patient was transferred to [facility]. The patient arrived on BiPAP 10/5 and a 45% FiO2 with good oxygen saturation.       Hospital/ICU Course:  Admitted as a transfer from [facility] on 5/25/2018 for higher level of care. Patient required BiPAP on  admission but was able to wean to HFNC on second day of hospitalization. ID was consulted and had made changes to antimicrobials with continuing vancomycin, switching voriconazole to isavuconazonium and zosyn to meropenem. Blood cultures showed NGTD.  05/28/2018 Worsening respiratory status requiring intubation morning of 5/28/2018 for oxygen saturation in the lower 70's with emergent bronchoscopy done following intubation revealing left system mucus plugging. Following suctioning of mucus plugging oxygen saturation had finally improved. Repeat CT chest did not show mediastinal collection.    05/29/2018 drop in H/H requiring two units of pRBC. Heparin held. Plan for extubation today. Continuing diuresis and management of resp secretions.         Interval History/Significant Events:   Has a drop in H/H requiring two units of pRBC this am. Heparin held and CT abdomen ordered for occult abdo bleed. Plan for extubation today. Continuing diuresis and management of resp secretions.     Remains intubated and on minimal fentanyl. Following commands.        albuterol-ipratropium  3 mL Nebulization Q4H    baclofen  5 mg Oral TID    chlorhexidine  15 mL Mouth/Throat BID    dapsone  100 mg Oral Daily    escitalopram oxalate  20 mg Oral Daily    [START ON 5/29/2018] famotidine (PF)  20 mg Intravenous BID    guaiFENesin  1,200 mg Oral BID    custom IVPB builder  372 mg Intravenous Q8H    Followed by    [START ON 5/29/2018] custom IVPB builder  372 mg Intravenous Q24H    meropenem (MERREM) IVPB  2 g Intravenous Q8H    methylPREDNISolone sodium succinate  80 mg Intravenous Q12H    montelukast  10 mg Oral Daily    pravastatin  80 mg Oral Daily    sodium chloride 3%  4 mL Nebulization BID    vancomycin (VANCOCIN) IVPB  1,250 mg Intravenous Q12H    verapamil  80 mg Oral TID      fentanyl Stopped (05/28/18 1045)    heparin (porcine) in D5W 7.987 Units/kg/hr (05/28/18 1300)    norepinephrine bitartrate-D5W 0.15  mcg/kg/min (05/28/18 1300)    propofol       acetaminophen, ALPRAZolam, artificial tears, HYDROcodone-acetaminophen, labetalol, ondansetron, promethazine, ramelteon, sodium chloride 0.9%, sodium chloride 3%      Review of Systems   Reason unable to perform ROS: intubation      Objective:     Vital Signs (Most Recent):  Temp: 98.3 °F (36.8 °C) (05/28/18 1100)  Pulse: 79 (05/28/18 1300)  Resp: 14 (05/28/18 1300)  BP: 106/67 (05/28/18 1300)  SpO2: 100 % (05/28/18 1300) Vital Signs (24h Range):  Temp:  [97.1 °F (36.2 °C)-98.3 °F (36.8 °C)] 98.3 °F (36.8 °C)  Pulse:  [] 79  Resp:  [14-48] 14  SpO2:  [65 %-100 %] 100 %  BP: ()/() 106/67   Weight: 81.4 kg (179 lb 7.3 oz)  Body mass index is 32.82 kg/m².      Intake/Output Summary (Last 24 hours) at 05/28/18 1423  Last data filed at 05/28/18 1300   Gross per 24 hour   Intake          1598.81 ml   Output             1980 ml   Net          -381.19 ml       Physical Exam   Constitutional: No distress.   HENT:   Head: Normocephalic and atraumatic.   On BiPAP, cushingoid features   Eyes: EOM are normal.   Left conjunctival bleb   Neck: Neck supple.   Cardiovascular: Normal rate and regular rhythm.    Pulmonary/Chest: Effort normal. No respiratory distress. She has no wheezes.   Intubated, Transmitted vent sounds   Abdominal: Soft. She exhibits no distension.   Musculoskeletal: She exhibits no edema.   Neurological:   Follows commands.    Skin: Skin is warm. No rash noted. She is not diaphoretic. No erythema. There is pallor.       Vents:  Vent Mode: A/C (05/28/18 1259)  Ventilator Initiated: Yes (05/28/18 0806)  Set Rate: 14 bmp (05/28/18 1259)  Vt Set: 400 mL (05/28/18 1259)  Pressure Support: 0 cmH20 (05/28/18 1259)  PEEP/CPAP: 8 cmH20 (05/28/18 1259)  Oxygen Concentration (%): 100 (05/28/18 1300)  Peak Airway Pressure: 44 cmH2O (05/28/18 1259)  Plateau Pressure: 0 cmH20 (05/28/18 1259)  Total Ve: 17.6 mL (05/28/18 1259)  F/VT Ratio<105 (RSBI): (!) 11.15  (05/28/18 1259)  Lines/Drains/Airways     Peripherally Inserted Central Catheter Line                 PICC Double Lumen 05/24/18 left brachial 4 days          Drain                 NG/OG Tube 05/24/18 0000 4 days         Urethral Catheter 05/24/18 16 Fr. 4 days          Airway                 Airway - Non-Surgical 05/28/18 0751 Other (Comment) less than 1 day          Peripheral Intravenous Line                 Peripheral IV - Single Lumen 05/26/18 0200 Left Forearm 2 days              Significant Labs:    CBC/Anemia Profile:    Recent Labs  Lab 05/27/18  0104 05/28/18  0238   WBC 12.57 13.12*  13.12*   HGB 8.5* 7.5*  7.5*   HCT 27.0* 23.8*  23.8*    205  205   MCV 96 97  97   RDW 16.9* 16.8*  16.8*        Chemistries:    Recent Labs  Lab 05/27/18  0104 05/27/18  0900 05/28/18  0238     --  143   K 3.0* 5.0 3.5   CL 91*  --  91*   CO2 40*  --  39*   BUN 16  --  22*   CREATININE 0.7  --  0.7   CALCIUM 7.6*  --  8.0*   ALBUMIN 2.1*  --  2.2*   PROT 4.8*  --  4.8*   BILITOT 0.5  --  0.6   ALKPHOS 432*  --  393*   *  --  386*   *  --  64*   MG 1.6 2.7*  --    PHOS 2.8  --   --        Blood Culture: No results for input(s): LABBLOO in the last 48 hours.  Respiratory Culture: No results for input(s): GSRESP, RESPIRATORYC in the last 48 hours.  Urine Culture: No results for input(s): LABURIN in the last 48 hours.  Urine Studies: No results for input(s): COLORU, APPEARANCEUA, PHUR, SPECGRAV, PROTEINUA, GLUCUA, KETONESU, BILIRUBINUA, OCCULTUA, NITRITE, UROBILINOGEN, LEUKOCYTESUR, RBCUA, WBCUA, BACTERIA, SQUAMEPITHEL, HYALINECASTS in the last 48 hours.    Invalid input(s): WRIGHTSUR    Significant Imaging:    CT chest with contrast:  Bilateral pleural effusions with adjacent right and left lower lobe collapse.    Nonspecific retroperitoneal soft tissue density at the level of the midesophagus that may represent volume averaging of fluid within the esophagus.  No drainable fluid collection  identified in the mediastinum.  Should prior images become available we will be pleased to review them for comparison.    Although this study is not optimal for characterization of pulmonary thromboembolism, the lingular branch of the left pulmonary artery is not well opacified with contrast, worrisome for a possible pulmonary thromboembolism.    Electronically signed by resident: Renate Watts  Date: 05/28/2018  Time: 14:15    Electronically signed by: Mari Meza MD  Date: 05/28/2018  Time: 14:41    Assessment/Plan:     Ophtho   Conjunctival edema of both eyes    -- likely due to voriconazole currently improving after switching to isavuconazonium.         Pulmonary   * Acute hypoxemic respiratory failure    -- Likely multifactorial with respiratory secretion, asthma exacerbation, and volume overload all likely to be contributing. Ddx of PE considered less likely in setting of recent CTA on 5/8/2018 being negative and CT chest with contrast on 5/25/2018 with grossly normal pulmonary vasculature in addition to patient being on appropriate dvt ppx make the dx of PE less likely. Furthermore. CT with contrast on 5/28/2018 was questionable for lingular branch PE. Heparin held in event of acute drop in H/H with plans for resuming heparin 24 hours from count recovery and ruling out active source of bleeding.   -- continue broad spectrum abx with vancomycin and meropenem covering for MRSA and Achromobacter xylogens for +ve cultures from bronchial wash on EBUS and bronch on 5/22 and 5/24, respectively. Continue Solumedrol 80mg bid.  -- duonebs [id number] + 3% NS + cough assist device + use Aerobika with duonebs when appropriate.  -- giving one dose of lasix 60mg IV this am.   -- intubated on 5/28/2018 and bronchoscopy revealing mucus plugging of left main bronchial system.  -- CT chest with contrast on 5/28/2018 revealed no mediastinal abscess.   -- on minimal vent settings currently. Plan for bronchoscopy prior to  extubation.   -- plans for thoracentesis after extubation in setting of bilateral pleural effusion with atelectasis.         Severe persistent asthma    -- on Xolair and prednisone taper at home.   -- mx as above per hypoxic resp failure.   -- continue IV solumedrol at 80mg bid.        Cardiac/Vascular   Hypotension    -- likely due to intubation and being on sedation + hypovolemia due to blood loss.   -- was on levophed overnight currently off pressors with good BP.        Mixed hyperlipidemia    -- continue home pravachol        Essential hypertension    Hypotension  -- hold verapamil 240mg od for hypotension   -- resume when appropriate.         ID   ABPA (allergic bronchopulmonary aspergillosis)  Chronic immunosuppression     -- s/p voriconazole and steroid taper treated at [facility] end of 2017.   -- starting dapsone 100mg daily.  -- appreciated ID's assistance.        MRSA bacteremia     -- MRSA grew initially in urine, then blood, and finally in mediastinal collection.   -- SHEILA performed 5/8/2018 was negative for endocarditis.  -- hx of prolonged immunosuppression on Prednisone and Xolair predisposing patient to fungal infection. Patient with recent hx of ABPA completed course of voriconazole and steroid taper.   -- continue IV Vancomycin. Repeat cultures here NGTD. Last sterile blood culture per OSH was 5/18/2018. Repeat blood cultures here with NGTD.    -- s/p voriconazole and steroid taper end of 2017.   -- bronchial washing on 5/22/ grew MRSA  -- switch Zosyn to meropenem for 7 day covering for Achromobacter xylogens.   -- ID following, appreciate their assistance.               Mediastinal collection    Bilateral pleural effusion     -- ill-defined mediastinal collection with mediastinal abscess, loculated pleural effusion, and complex pleural effusion are among the possible differentials.  -- GS and culture during EBUS from bronchial washing at OSH grew MRSA + Achromobacter.   -- had 3-4 FNA  passes on EBUS that showed no malignancy. Apparently, no samples were obtained from mediastinal abscess during EBUS at OSH on 5/22/2018.  -- continue broad spectrum abx including vancomycin and Meropenem treating for MRSA bacteremia and achromobacter from BAL on 5/22 and 5/24.    -- repeat CT chest with contrast on 5/28/2018 here revealed no persistent mediastinal collection.   -- abscess likely has resolved following appropriate abx treatment. No need for surgical intervention at this time.              Hematology  Blood loss anemia    -- likely from being on heparin gtt.   -- no clear source of bleeding so far.   -- H/H 5.7/18.1 this am with acute drop from baseline.  -- transfusing two units of pRBC this am.  -- obtaining CT abdomen to rule out retroperitoneal bleed.           History of DVT of lower extremity    Rule out PE   -- hx of DVT in left lower extremity. Completed 3 month course of Eliquis late 2017.   -- CT with contrast on 5/28 showed poor lingular artery filling questionable for PE. Considering overall clinical picture, PE is not thought to be a contributing factor. Heparin gtt has been held this am in setting of rapidly dropping H/H.   -- continue to follow closely.         Endocrine   Impaired glucose tolerance    -- likely due to being on steroids chronically.   -- a1c 5.2, good poct glucose. d/c LDSSI.   -- initiate TF / diet today with impact peptide 1.5, appreciate nutrition rec's.        GI   Abnormal LFTs    -- likely due to drug adverse effect suspected from voriconazole. Switched to isavuconazonium in setting of conjunctival swelling now with improving LFT's.   -- LFT's improving.         GERD (gastroesophageal reflux disease)  Cosntipation    -- resume ppi.  -- starting miralax bid.            Critical Care Daily Checklist:    A: Awake: RASS Goal/Actual Goal: RASS Goal: 2-->agitated  Actual: Ricks Agitation Sedation Scale (RASS): Drowsy   B: Spontaneous Breathing Trial Performed?     C:  SAT & SBT Coordinated?  yes                      D: Delirium: CAM-ICU Overall CAM-ICU: Negative   E: Early Mobility Performed? yes   F: Feeding Goal: Goals: tolerate TF at Goal rate  Status: Nutrition Goal Status: new   Current Diet Order   Procedures    Diet NPO      AS: Analgesia/Sedation Fentanyl    T: Thromboembolic Prophylaxis Holding heparin gtt this am. Continue SCD.   H: HOB > 300 yes   U: Stress Ulcer Prophylaxis (if needed) Continue ppi   G: Glucose Control prn   B: Bowel Function Stool Occurrence: 0   I: Indwelling Catheter (Lines & Olivares) Necessity yes   D: De-escalation of Antimicrobials/Pharmacotherapies Not today    Plan for the day/ETD SBT and extubation. Follow CT abdomen for source of bleeding.     Code Status:  Family/Goals of Care: Full Code           Hien Pressley MD  Critical Care Medicine  [facility]    Acute hypoxemic respiratory failure due to mucus plugging / airway secretions- requiring emergent intubation and bronch to clear mucuous plugs. Plan diuresis, SBT today.   Asthma- cont current rx - including steroids  ABPA- treated with steroids, anti IgE, voriconazole  Sepsis   MRSA bacteremia with mediastinal fluid collection + on indium scan- Repeat CT shows significant improvement of mediastinal abscess, no need for source control, plan continue IV ABX    Anemia multifactorial spherocytosis    ? PE, plan lower US r/o DVT, f/u H/H if no signs of active bleeding will restart heparin         Critical Care Time: 35 minutes  Critical secondary to Patient has a condition that poses threat to life and bodily function: hypoxic respiratory failure     Critical care was time spent personally by me on the following activities: development of treatment plan with patient or surrogate and bedside caregivers, discussions with consultants, evaluation of patient's response to treatment, examination of patient, ordering and performing treatments and interventions, ordering and review of laboratory  studies, ordering and review of radiographic studies, pulse oximetry, re-evaluation of patient's condition. This critical care time did not overlap with that of any other provider or involve time for any procedures.   [facility]

## 2018-05-29 NOTE — ASSESSMENT & PLAN NOTE
57-year-old female  With hx of  Severe persistent asthma on omalizumab, prolonged steroid use. has hx of ABPA and Bronchiectasis. Prior Voriconazole intolerance that lead to vision loss. Now admitted with  MRSA bacteremia, bacteriuria, pneumonia; SHEILA negative.  Mediastinal mass s/p EUS with biopsy - no malignancy identified, no cultures sent.   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    Recommendations:    - Continue vancomycin for MRSA bacteremia, goal trough 15-20, will need at least 6 weeks  - meropenem for coverage of Achromobacter, plan for 7 day course. Today is day 4/7  - Continue isavuconazonium for ABPA- cont until re evaluation in ID clinic upon discharge   - follow up final bronchoscopy results, thus far unremarkable   - consider sampling effusions that were notable on the CT scan  - cont dapsone for PJP prophylaxis given prolonged steroid use - cont until re evaluation in ID clinic upon discharge

## 2018-05-29 NOTE — PT/OT/SLP PROGRESS
Physical Therapy      Patient Name:  Kamla Coulter   MRN:  [id number]       Pass MOVE screen 5/29    Patient not seen today. Pt re-intubated yesterday with emergent bronch. Pt with critically low H and H (5&18) with scheduled CT scan this date to assess for bleeding. RN also reports increased agitation with stimulation. RN agreed to defer session. Will follow up when appropriate.     Altagracia Colby, PT, DPT  5/29/2018  [phone]

## 2018-05-29 NOTE — PROGRESS NOTES
[facility]  Infectious Disease  Progress Note    Patient Name: Kamla Coulter  MRN: [id number]  Admission Date: 5/25/2018  Length of Stay: 4 days  Attending Physician: Mario Stallings MD  Primary Care Provider: Molina Alexandre MD    Isolation Status: Contact  Assessment/Plan:      * Acute hypoxemic respiratory failure    - remains intubated.   Vent Mode: A/C  Oxygen Concentration (%):  [40-60] 40  Resp Rate Total:  [14 br/min-16 br/min] 14 br/min  Vt Set:  [400 mL] 400 mL  PEEP/CPAP:  [5 cmH20-8 cmH20] 5 cmH20  Pressure Support:  [0 cmH20] 0 cmH20  Mean Airway Pressure:  [9.9 zxU10-20 cmH20] 9.9 cmH20    - per primary team        Achromobacter pneumonia    - meropenem for coverage of Achromobacter, plan for 7 day course. Today is day 4/7  - follow up final bronchoscopy results, thus far unremarkable         MRSA bacteremia    57-year-old female  With hx of  Severe persistent asthma on omalizumab, prolonged steroid use. has hx of ABPA and Bronchiectasis. Prior Voriconazole intolerance that lead to vision loss. Now admitted with  MRSA bacteremia, bacteriuria, pneumonia; SHEILA negative.  Mediastinal mass s/p EUS with biopsy - no malignancy identified, no cultures sent.   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    Recommendations:    - Continue vancomycin for MRSA bacteremia, goal trough 15-20, will need at least 6 weeks  - meropenem for coverage of Achromobacter, plan for 7 day course. Today is day 4/7  - Continue isavuconazonium for ABPA- cont until re evaluation in ID clinic upon discharge   - follow up final bronchoscopy results, thus far unremarkable   - consider sampling effusions that were notable on the CT scan  - cont dapsone for PJP prophylaxis given  prolonged steroid use - cont until re evaluation in ID clinic upon discharge            Mediastinal collection    - Patient with persistent  mediastinal mass - possibly source of persistent MRSA bacteremia  - consider EGD for possible sampling          ABPA (allergic bronchopulmonary aspergillosis)    - Continue isavuconazonium for ABPA- cont until re evaluation in ID clinic upon discharge               Anticipated Disposition: ID will sign off at this time    Thank you for your consult. I will sign off. Please contact us if you have any additional questions.    Delia Salcedo MD  Infectious Disease  [facility]        Subjective:     Principal Problem:Acute hypoxemic respiratory failure    HPI: Case of 58 y/o female with PMHx athma on steroids and Xolair c/w ABPA and bronchiectasis. Transferred from [facility]. Initially presented on 5/2/18 with c/c SOB, low grade fevers. Course complicated with MRSA bacteremia/pneumonia and mediastinal collection positive for Achromobacter sp and MRSA. SHEILA done at OSH negative for IE. Recently extubated on 5/25/18 to BiPAP and transferred to [facility] for higher level of care ID consulted for antibiotic recommendations. Patient had previosuly being followed by Dr Loco for ABPA had discontinued voriconazole 2ry to vision loss that had not returned to baseline.   Interval History:     Remains intubated. On levophed at 0.06. Afebrile hbg drop this AM, to 5.7. CT abd pelvis showed no hematoma. Did show bilateral pleural effusions, and the same mass proximal to the esophagus.  Has been on heparin gtt. BAL from 5/28 with no significant amt of WBC, ngtd at this ttime.      albuterol-ipratropium  3 mL Nebulization Q4H    baclofen  5 mg Oral TID    chlorhexidine  15 mL Mouth/Throat BID    dapsone  100 mg Oral Daily    escitalopram oxalate  20 mg Oral Daily    famotidine (PF)  20 mg Intravenous BID    guaiFENesin  1,200 mg Oral BID    custom IVPB builder   372 mg Intravenous Q24H    meropenem (MERREM) IVPB  2 g Intravenous Q8H    methylPREDNISolone sodium succinate  80 mg Intravenous Q12H    montelukast  10 mg Oral Daily    polyethylene glycol  17 g Per G Tube QID    pravastatin  80 mg Oral Daily    sodium chloride 3%  4 mL Nebulization BID    sodium phosphates  1 enema Rectal Once    vancomycin (VANCOCIN) IVPB  1,250 mg Intravenous Q24H         Review of Systems   Reason unable to perform ROS: intubation      Objective:     Vital Signs (Most Recent):  Temp: 97.9 °F (36.6 °C) (05/29/18 1200)  Pulse: 89 (05/29/18 1440)  Resp: 16 (05/29/18 1440)  BP: (!) 109/56 (05/29/18 1430)  SpO2: 98 % (05/29/18 1440) Vital Signs (24h Range):  Temp:  [97.8 °F (36.6 °C)-98.2 °F (36.8 °C)] 97.9 °F (36.6 °C)  Pulse:  [] 89  Resp:  [13-46] 16  SpO2:  [93 %-100 %] 98 %  BP: ()/(39-83) 109/56     Weight: 81.4 kg (179 lb 7.3 oz)  Body mass index is 32.82 kg/m².    Estimated Creatinine Clearance: 89.7 mL/min (based on SCr of 0.7 mg/dL).    Physical Exam   Constitutional: No distress.   Intubated, sedated    HENT:   Head: Normocephalic and atraumatic.   Eyes: EOM are normal.   Neck: Neck supple.   Cardiovascular: Normal rate and regular rhythm.    Pulmonary/Chest: Effort normal. No respiratory distress. She has no wheezes.   Intubated, Transmitted vent sounds   Abdominal: Soft. She exhibits no distension.   Musculoskeletal: She exhibits no edema.   Neurological:   Fully sedated   Skin: Skin is warm. No rash noted. She is not diaphoretic. No erythema. There is pallor.       Significant Labs:   Blood Culture:   Recent Labs  Lab 05/25/18  2200 05/25/18  2213   LABBLOO No Growth to date  No Growth to date  No Growth to date  No Growth to date No Growth to date  No Growth to date  No Growth to date  No Growth to date     CBC:   Recent Labs  Lab 05/28/18  0238 05/29/18  0402 05/29/18  0441   WBC 13.12*  13.12* 11.33 11.25   HGB 7.5*  7.5* 5.8* 5.7*   HCT 23.8*  23.8*  17.7* 18.1*     205 189 197     CMP:   Recent Labs  Lab 05/28/18  0238 05/29/18  0402    143   K 3.5 3.4*   CL 91* 97   CO2 39* 37*   * 194*   BUN 22* 27*   CREATININE 0.7 0.7   CALCIUM 8.0* 7.8*   PROT 4.8* 4.1*   ALBUMIN 2.2* 1.9*   BILITOT 0.6 0.4   ALKPHOS 393* 266*   AST 64* 36   * 205*   ANIONGAP 13 9   EGFRNONAA >60.0 >60.0     Respiratory Culture: No results for input(s): GSRESP, RESPIRATORYC in the last 4320 hours.    Significant Imaging:  CT abd 5/29  No intra-abdominal or retroperitoneal hematoma identified.    Large subcutaneous soft tissue opacity overlying the right lateral abdominal wall.  While this finding demonstrates imaging appearance most compatible with significant dependent edema, it may correlate with hematoma given this patient's history of acute drop in hemoglobin.  No focal fluid collection identified.  Correlation with physical examination findings is recommended.    Large volume stool within the rectum, likely correlating with impaction.    Multiple compression deformities as detailed above, appearing new since 08/24/2017.    Additional findings include:    -Stable bilateral pleural effusions with associated compressive atelectatic change    -Slight interval increase in pericardial fluid, remaining small in volume    -Small volume perihepatic ascites    -2.6 cm hypodense focus in the gallbladder fossa correlating with recent ultrasound findings.  In this patient status post cholecystectomy, this finding may correlate with small residual postoperative fluid collection versus prominent residual cystic duct    -Nonspecific 1.8 cm pancreatic hypodensity.  Cystic pancreatic neoplasm cannot be excluded as described on recent ultrasound.  Further evaluation with pancreas protocol CT may be obtained as clinically indicated.

## 2018-05-29 NOTE — NURSING
0915 Transported pt to CT [facility] on bed, accompanied by RN, RT and PCT.  Portable vent, monitor, and IV pole.    0925 Returned from CT without incident.  Pt appears comfortable, nods appropriately, and demonstrates understanding.  Fentanyl and Levo gtts remain in progress.   at bedside.

## 2018-05-29 NOTE — PROGRESS NOTES
MD Wade notified of pt. Drop in H/H. No overt signs of bleeding noted. Type and screen ordered. Repeat CBC sent. TM.

## 2018-05-29 NOTE — ASSESSMENT & PLAN NOTE
-- Likely multifactorial with respiratory secretion, asthma exacerbation, and volume overload all likely to be contributing. Ddx of PE considered less likely in setting of recent CTA on 5/8/2018 being negative and CT chest with contrast on 5/25/2018 with grossly normal pulmonary vasculature in addition to patient being on appropriate dvt ppx make the dx of PE less likely. Furthermore. CT with contrast on 5/28/2018 was questionable for lingular branch PE. Heparin held in event of acute drop in H/H.  -- continue broad spectrum abx with vancomycin and meropenem covering for MRSA and Achromobacter xylogens for +ve cultures from bronchial wash on EBUS and bronch on 5/22 and 5/24, respectively. Continue Solumedrol 80mg bid.  -- duonebs [id number] + 3% NS + cough assist device + use Aerobika with duonebs when appropriate.  -- giving one dose of lasix 60mg IV this am.   -- intubated on 5/28/2018 and bronchoscopy revealing mucus plugging of left main bronchial system.  -- CT chest with contrast on 5/28/2018 revealed no mediastinal abscess.   -- decreasing vent settings this am given good SpO2 on monitor with plans for SBT and extubation later today.

## 2018-05-29 NOTE — ASSESSMENT & PLAN NOTE
-- MRSA grew initially in urine, then blood, and finally in mediastinal collection.   -- SHEILA performed 5/8/2018 was negative for endocarditis.  -- hx of prolonged immunosuppression on Prednisone and Xolair predisposing patient to fungal infection. Patient with recent hx of ABPA completed course of voriconazole and steroid taper.   -- continue IV Vancomycin. Repeat cultures here NGTD. Last sterile blood culture per OSH was 5/18/2018. Repeat blood cultures here with NGTD.    -- s/p voriconazole and steroid taper end of 2017.   -- bronchial washing on 5/22/ grew MRSA  -- switch Zosyn to meropenem for 7 day covering for Achromobacter xylogens.   -- ID following, appreciate their assistance.

## 2018-05-29 NOTE — ASSESSMENT & PLAN NOTE
- Patient with persistent  mediastinal mass - possibly source of persistent MRSA bacteremia  - consider EGD for possible sampling

## 2018-05-30 PROBLEM — F41.9 ANXIETY: Status: ACTIVE | Noted: 2018-01-01

## 2018-05-30 PROBLEM — E87.3 METABOLIC ALKALOSIS: Status: ACTIVE | Noted: 2018-01-01

## 2018-05-30 PROBLEM — R18.8 ABDOMINAL WALL FLUID COLLECTIONS: Status: ACTIVE | Noted: 2018-01-01

## 2018-05-30 PROBLEM — D69.6 THROMBOCYTOPENIA: Status: ACTIVE | Noted: 2018-01-01

## 2018-05-30 PROBLEM — R41.0 DELIRIUM: Status: ACTIVE | Noted: 2018-01-01

## 2018-05-30 PROBLEM — F41.1 GENERALIZED ANXIETY DISORDER: Status: ACTIVE | Noted: 2018-01-01

## 2018-05-30 PROBLEM — F41.9 ANXIETY DISORDER, UNSPECIFIED: Chronic | Status: ACTIVE | Noted: 2018-01-01

## 2018-05-30 NOTE — PT/OT/SLP EVAL
"Speech Language Pathology Evaluation  Bedside Swallow    Patient Name:  Kamla Coulter   MRN:  [id number]  Admitting Diagnosis: Acute hypoxemic respiratory failure  The primary encounter diagnosis was Essential hypertension. Diagnoses of Respiratory failure, Acute hypoxemic respiratory failure, ABPA (allergic bronchopulmonary aspergillosis), Mucus plugging of bronchi, and Atelectasis were also pertinent to this visit.    Recommendations:                 General Recommendations:  Dysphagia therapy  Diet recommendations:  NPO, NPO   Aspiration Precautions: Alternate means of nutrition/hydration, Frequent oral care and Strict aspiration precautions    General Precautions: Standard, aspiration, contact, respiratory  Communication strategies:  none    History:     Past Medical History:   Diagnosis Date    ABPA (allergic bronchopulmonary aspergillosis)     Allergy     Anxiety     Arachnoid cyst     monitoring    Asthma     COPD (chronic obstructive pulmonary disease)     Hemoptysis     Hypertension     Tachycardia     Thalamic disease     thalamia minor       Past Surgical History:   Procedure Laterality Date    BREAST SURGERY      breast bx     BRONCHOSCOPY  10/2016    [place]     SECTION      CHOLECYSTECTOMY      COLONOSCOPY N/A 2017    Procedure: COLONOSCOPY;  Surgeon: Horacio Pelaez MD;  Location: [facility];  Service: General;  Laterality: N/A;    DENTAL SURGERY      GALLBLADDER SURGERY      HERNIA REPAIR      INTRAUTERINE DEVICE INSERTION      SINUS SURGERY         Social History: Patient lives with Spouse in [place], [place].    Prior Intubation HX:  18 -18    Chest X-Rays: 2018:   Persistent increased ground-glass opacifications increased compared to prior.    Prior diet: Regular, thin     Occupation/hobbies/homemaking: retired, [employer].    Subjective     SLP reviewed Pt with nurse, nurse clears for ST  Pt presents lethargic  She explains, "I'm thirsty"  Spouse asks, " ""When will you come back?"  Patient goals: to drink some apple juice    Pain/Comfort:  · Pain Rating 1: 8/10  · Location - Orientation 1: generalized  · Location 1: abdomen  · Pain Addressed 1: Reposition, Distraction, Cessation of Activity, Nurse notified  · Pain Rating Post-Intervention 1: 8/10    Objective:     Oral Musculature Evaluation  · Oral Musculature: general weakness  · Dentition: present and adequate  · Secretion Management: adequate  · Mucosal Quality: dry  · Mandibular Strength and Mobility: impaired  · Oral Labial Strength and Mobility: impaired coordination, impaired seal  · Lingual Strength and Mobility: impaired strength, impaired right lateral movement, impaired left lateral movement  · Volitional Cough: elciited, reduced   · Volitional Swallow: elcited, decreased hyolaryngeal excursion upon palpation of the larynx  · Voice Prior to PO Intake: clear, low intensity, breathy    Bedside Swallow Eval:   Consistencies Assessed:  · Thin liquids ice chipsx2, tsp sipsx2  · Puree 1/4 tsp bite, 1/2 tsp bite     Oral Phase:   · Decreased closure around utensil  · Dry mouth    Pharyngeal Phase:   · drop in SaO2 with tsp sips thin liquids  · decreased hyolaryngeal excursion to palpation  · delayed swallow initation    Compensatory Strategies  · None    Treatment: Nurse at bedside t/o assessment. SLP assists nurse in repositioning Pt in bed, HOB elevated. Family at bedside. Pt with decreased labial seal around teaspoon edge. Pt with drop in O2 to 83% following tsp presentations of thin liquids x1. Following break, O2 increases to 97%. Pt requires consistent curing to attend to PO trials, with open-mouth breathing posture and on HFNC. Additional trials held 2/2 high risk of aspiration. Patient and family educated on SLP role, S/S aspiration, aspiration precautions and SLP POC. Patient and family verbalize understanding. No further questions. Whiteboard updated.     Assessment:     Kamla Coulter is a 57 " y.o. female with an SLP diagnosis of Dysphagia.  She presents with lethargy, tenuous oxygen requirements and generalized oropharyngeal weakness with low level-trials presented at the bedside. Patient at high risk of aspiration and would benefit from alternative means nutrition/hyddration/medication.     Goals:    SLP Goals        Problem: SLP Goal    Goal Priority Disciplines Outcome   SLP Goal     SLP Ongoing (interventions implemented as appropriate)   Description:  Speech Language Pathology Goals  Goals expected to be met by 6/6/2018  1. Pt will participate in ongoing swallow assessment                       Plan:     · Patient to be seen:  4 x/week   · Plan of Care expires:  06/29/18  · Plan of Care reviewed with:  patient   · SLP Follow-Up:  Yes       Discharge recommendations:  nursing facility, skilled   Barriers to Discharge:  Level of Skilled Assistance Needed     Time Tracking:     SLP Treatment Date:   05/30/18  Speech Start Time:  1718  Speech Stop Time:  1744     Speech Total Time (min):  26 min    Billable Minutes: Eval Swallow and Oral Function 26    KUSH Pierson, CCC-SLP  Speech-Language Pathology  Pager: [phone]    05/30/2018

## 2018-05-30 NOTE — NURSING
PICC RN Moris came to bedside and was able to get PICC to aspirate.  Labs sent.  Heparin infusion rescheduled to possibly start after CBC results if results stable.
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
## 2018-05-30 NOTE — NURSING
TAD Schuler, [facility] - informed that PICC will not aspirate but does flush/instill.  Labs are not able to be drawn at present.  MD to put in orderr for PICC team(current picc from OSH).  Reviewed iv meds delay including Vanc for which trough will be drawn and med rescheduled when PICC situation resolved.   TAD PICC team, CARMEN Cosme - she will come to bedside to assess.  Cath flow ordered.

## 2018-05-30 NOTE — PT/OT/SLP EVAL
"Physical Therapy Evaluation    Patient Name:  Kamla Coulter   MRN:  [id number]  Co-eval with OT     Recommendations:     Discharge Recommendations:  nursing facility, skilled   Discharge Equipment Recommendations:  (TBD)   Barriers to discharge: Decreased caregiver support at current functional level     Assessment:     Kamla Coulter is a 57 y.o. female admitted with a medical diagnosis of Acute hypoxemic respiratory failure.  She presents with the following impairments/functional limitations:  impaired endurance, weakness, impaired self care skills, impaired functional mobilty, impaired balance, impaired cognition, pain, edema, impaired cardiopulmonary response to activity. Pt required total A for all functional mobility. Pt would benefit from a SNF upon d/c to maximize functional mobility and ensure safety.    Rehab Prognosis:  good; patient would benefit from acute skilled PT services to address these deficits and reach maximum level of function.      Recent Surgery: * No surgery found *      Plan:     During this hospitalization, patient to be seen 4 x/week to address the above listed problems via gait training, therapeutic activities, therapeutic exercises, neuromuscular re-education  · Plan of Care Expires:  06/27/18   Plan of Care Reviewed with: patient    Subjective     Communicated with RN prior to session.  Patient found in bed upon PT entry to room, agreeable to evaluation.      Chief Complaint: pain  Patient comments/goals: to get better and return home   Pain/Comfort:  · Pain Rating 1: 7/10 (L stomach)  · Pain Addressed 1: Distraction, Reposition, Nurse notified  · Pain Rating Post-Intervention 1: 7/10 (L stomach; "lumbar")    Patients cultural, spiritual, Nondenominational conflicts given the current situation: none reported     Living Environment:  Pt lives with  in [facility] with no KOLE  Prior to admission, patients level of function was mod indep with ambulation (rollator) and required assist " "with ADL's. Pt required O2 all the time (amount unknown).  Patient has the following equipment: bedside commode, shower chair, oxygen, rollator.  DME owned (not currently used): none.  Upon discharge, patient will have assistance from .    Objective:     Patient found with: telemetry, pulse ox (continuous), blood pressure cuff, oxygen, PICC line, NG tube, mays catheter     General Precautions: Standard, fall, contact   Orthopedic Precautions:N/A   Braces: N/A     Exams:  · Cognitive Exam:    · Oriented to person   · Follows all commands  · Pt perseverates occasionally ("lumbar pain", "edema")   · Sensation:    · -       Intact: B LE light touch; numbness reported in B feet  · RLE ROM:   · Full PROM  · Mod decrease in AROM  · RLE Strength: grossly 3-/5  · LLE ROM:  · Full PROM  · Mod decrease in AROM  · LLE Strength: grossly 3-/5    Functional Mobility:  · Bed Mobility:     · Scooting: total assistance  · Supine to Sit: total assistance   · Pt c/o "lumbar" pain upon sitting EOB  · Pt's HR increased to the 140's. RN present and requested pt return to supine.   · Sit to Supine: total assistance  · Transfers: not performed   · Gait: not performed   · Balance  · Static sit: total A  · Worked extensively on maintaining midline during sitting     AM-PAC 6 CLICK MOBILITY  Total Score:8       Therapeutic Activities and Exercises:  Educated pt on PT role/POC  Educated pt on importance of OOB activity  Pt would benefit from further education     Sitting EOB x 10 minutes with total A to increase tolerance to OOB activity and to create optimal propositioning for lung expansion     Patient left HOB elevated with all lines intact, call button in reach and RN present.    GOALS:    Physical Therapy Goals        Problem: Physical Therapy Goal    Goal Priority Disciplines Outcome Goal Variances Interventions   Physical Therapy Goal     PT/OT, PT Ongoing (interventions implemented as appropriate)     Description:  Goals to be " met by: 2018    Patient will increase functional independence with mobility by performin. Supine to sit with Moderate Assistance - not met  2. Sit to supine with Moderate Assistance - not met  3. Sit to stand transfer with Moderate Assistance using Rolling Walker - not met  4. Bed to chair transfer with Moderate Assistance using Rolling Walker - not met  5. Gait  x 50 feet with Moderate Assistance using Rolling Walker. - not met  6. Sitting at edge of bed x5 minutes with Moderate Assistance - not met  7. Lower extremity exercise program x10 reps per handout, with independence - not emt                      History:     Past Medical History:   Diagnosis Date    ABPA (allergic bronchopulmonary aspergillosis)     Allergy     Anxiety     Arachnoid cyst     monitoring    Asthma     COPD (chronic obstructive pulmonary disease)     Hemoptysis     Hypertension     Tachycardia     Thalamic disease     thalamia minor       Past Surgical History:   Procedure Laterality Date    BREAST SURGERY      breast bx     [place]     SECTION      CHOLECYSTECTOMY      COLONOSCOPY N/A 2017    Procedure: COLONOSCOPY;  Surgeon: Horacio Pelaez MD;  Location: [facility];  Service: General;  Laterality: N/A;    DENTAL SURGERY      GALLBLADDER SURGERY      HERNIA REPAIR      INTRAUTERINE DEVICE INSERTION      SINUS SURGERY         Time Tracking:     PT Received On: 18  PT Start Time: 910     PT Stop Time: 935  PT Total Time (min): 25 min     Billable Minutes: Evaluation 10 and Therapeutic Activity 10      Altagracia Colby, PT, DPT  2018  [phone]

## 2018-05-30 NOTE — ASSESSMENT & PLAN NOTE
-- likely due to intubation and being on sedation + hypovolemia due to blood loss.   -- was on levophed for 12 hours and weaned off 5/29/2018.  -- currently off pressors with good BP. Resuming verapamil 80mg [id number].

## 2018-05-30 NOTE — ASSESSMENT & PLAN NOTE
-- s/p voriconazole and steroid taper treated at [facility] end of 2017.   -- continue Isavuconazonium per ID.
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
## 2018-05-30 NOTE — ASSESSMENT & PLAN NOTE
-- drop in H/H following 2 unit pRBC transfusion on 5/29 likely due to HS as evident by increased t bili to 1.3 from 0.4. Sending for LDH and haptoglobin to confirm this.

## 2018-05-30 NOTE — PROGRESS NOTES
[facility]  Critical Care Medicine  Progress Note    Patient Name: Kamla Coulter  MRN: [id number]  Admission Date: 5/25/2018  Hospital Length of Stay: 5 days  Code Status: Full Code  Attending Provider: Mario Stallings MD  Primary Care Provider: Molina Alexandre MD   Principal Problem: Acute hypoxemic respiratory failure    Subjective:     HPI:    This is a 57 year old female with hx of severe persistent asthma on Xolair, prolonged immunosuppression on steroids, ABPA,bronchiectasis, HTN, DVT, and GERD who is transferred from [facility] for higher level of care. She initially presented to the OSH on May second for progressive shortness of breath, wheezing and orthopnea over a period of 2-3 days associated with low-grade fever. She was admitted to the hospital medicine floor and started on IV solumedrol 40mg bid and was placed on BiPAP. She was weaned off to NC on the second day of admission. Blood cultures from admission grew MRSA bacteremia and patient was started on vancomycin. Urine and sputum cultures both grew MRSA. The patient had persistent MRSA bacteremia despite being on vancomycin and a SHEILA was done on 5/8/2018 that was negative for endocarditis and revealed normal EF%. A bone scan on 5/10/2018 was similarly negative for an infection nidus. On 5/10/2018 the patient developed an acute hypoxic respiratory failure that did not improve promptly on BiPAP with an FiO2 of 100% and the patient was moved to the ICU and started empirically on therapeutic Lovenox. A CTA done on the same day revealed no PE but was notable for worsening bilateral pleural effusion and a non-specific infiltrate/collection around the mid-esophagus. The collection was thought to be a loculated pleural effusion. IV solumedrol was increased to 80mg twice daily and Lovenox was decreased to ppx dosing. By 5/12/2018 the patient's respiratory failure had improved and patient was weaned off of BiPAP to  nasal cannula. Sputum cultures collected on 5/13/2018 showed budding yeast and the patient was started on voriconazole. On 5/15/2018 the patient had worsening hypoxia and increased work of breathing and a CXR showed HAP and Ceftaroline was added to vancomycin. On 5/16/2018, repeat blood cultures showed persistent MRSA bacteremia and an Indium scan performed on 5/18/2018 revealed significant uptake by a collection located adjacent to the mid-esophagus. The patient was electively intubated on 5/22/2018 and an EGD revealed a whitish plaque at the proximal esophagus that was biopsied and resulted negative for malignant changes. Similarly, on the same day, an EBUS was performed and showed fluid collection posterior to the distal trachea that was better assessed through 5 FNA passes. Cultures from the EBUS grew MRSA + Alcaligenes/Achromobcater Xylosoxidens. However, FNA was negative for any malignancies. The patient was extubated the same day. Two days later (5/24/2018), the patient developed an acute decompensated respiratory failure with oxygen saturations dropping to the 60's and the patient was emergently intubated. CXR showed left lower lobe atelectasis. An emergent bronchoscopy revealed a mucus plug in the left mainstem bronchus. The mucus plug was brown, thick, and difficult to suction raising suspicion for Aspergillus infection. Following mucus plug disimpaction, the patient's respiratory status recovered immediately and the patient was extubated the next morning (5/25/2018). Prior to that a repeat CT chest showed persistent collection around the mid-esophagus. At this point, it was decided that the patient was better served at a higher level of care facility and the patient was transferred to [facility]. The patient arrived on BiPAP 10/5 and a 45% FiO2 with good oxygen saturation.       Hospital/ICU Course:  Admitted as a transfer from [facility] on 5/25/2018 for higher level of care. Patient required BiPAP on  admission but was able to wean to HFNC on second day of hospitalization. ID was consulted and had made changes to antimicrobials with continuing vancomycin, switching voriconazole to isavuconazonium and zosyn to meropenem. Blood cultures showed NGTD.  05/28/2018 Worsening respiratory status requiring intubation morning of 5/28/2018 for oxygen saturation in the lower 70's with emergent bronchoscopy done following intubation revealing left system mucus plugging. Following suctioning of mucus plugging oxygen saturation had finally improved. Repeat CT chest did not show mediastinal collection.    05/29/2018 drop in H/H requiring two units of pRBC. Heparin held. Plan for extubation today. Continuing diuresis and management of resp secretions. CT abdomen revealed no intraabdominal source of bleeding. Patient had repeat bronchoscopy and extubated to [facility].    05/30/2018 resuming heparin gtt and weaning oxygen requirement prn. Delirious overnight.           Interval History/Significant Events:   NAEON.  Extubated following repeat bronchoscopy yesterday. H/H stable since holding the heparin with no clear source of bleeding. Resuming heparin this am.  Delirious overnight with reportedly having little sleep.    Review of Systems   Unable to obtain given patient's factors.    Objective:     Vital Signs (Most Recent):  Temp: 97.7 °F (36.5 °C) (05/30/18 0300)  Pulse: 103 (05/30/18 0600)  Resp: (!) 37 (05/30/18 0600)  BP: 134/72 (05/30/18 0600)  SpO2: 99 % (05/30/18 0600) Vital Signs (24h Range):  Temp:  [97.7 °F (36.5 °C)-98.1 °F (36.7 °C)] 97.7 °F (36.5 °C)  Pulse:  [] 103  Resp:  [14-42] 37  SpO2:  [86 %-100 %] 99 %  BP: ()/(44-97) 134/72   Weight: 81.4 kg (179 lb 7.3 oz)  Body mass index is 32.82 kg/m².      Intake/Output Summary (Last 24 hours) at 05/30/18 0718  Last data filed at 05/30/18 0600   Gross per 24 hour   Intake           2424.5 ml   Output             5360 ml   Net          -2935.5 ml       Physical  Exam  General: obese lady with Cushingoid facies, tachypnic. On CFNC. Delirious.   Eyes: chemosis improved with lateral conjunctival edema b/l, PERRL. EOMI.   Neck: supple, symmetrical, trachea midline, no JVD.  HENT: AT NC  Cardiovascular: Heart: tachycardic, regular rhythm, S1, S2 normal, no murmur, click, rub or gallop.  Lungs: slight ronchi heard dispersedly,   Chest Wall: no tenderness.  Abdomen/Rectal: Abdomen: Non distended. No BS. No masses. No TTP.   Extremities: 1+ pitting edema up to the ankles, no redness or tenderness in the calves or thighs. Pulses: 2+ and symmetric  Skin: Skin color, texture, turgor normal. No rashes or lesions  Musculoskeletal: full range of motion of joints.   Lymph Nodes: No cervical or supraclavicular adenopathy  Psych/Behavioral: Normal. Alert but disoriented       Lines/Drains/Airways     Peripherally Inserted Central Catheter Line                 PICC Double Lumen 05/24/18 right brachial 6 days          Drain                 NG/OG Tube 05/24/18 0000 6 days         Urethral Catheter 05/24/18 16 Fr. 6 days              Significant Labs:    CBC/Anemia Profile:    Recent Labs  Lab 05/29/18  0441 05/29/18  1612 05/30/18  0413   WBC 11.25 13.88* 10.28   HGB 5.7* 11.0* 10.3*   HCT 18.1* 33.3* 30.8*    167 138*   MCV 97 87 87   RDW 17.1* 19.6* 19.3*        Chemistries:    Recent Labs  Lab 05/29/18  0402 05/30/18  0413    144   K 3.4* 2.6*   CL 97 92*   CO2 37* 40*   BUN 27* 21*   CREATININE 0.7 0.6   CALCIUM 7.8* 7.9*   ALBUMIN 1.9* 2.3*   PROT 4.1* 4.9*   BILITOT 0.4 1.3*   ALKPHOS 266* 272*   * 167*   AST 36 56*     Assessment/Plan:     Ophtho   Conjunctival edema of both eyes    -- likely due to voriconazole currently improving after switching to isavuconazonium.           Pulmonary   * Acute hypoxemic respiratory failure    -- Likely multifactorial with respiratory secretion, asthma exacerbation, and volume overload all likely to be contributing.   -- new PE  findings on CT chest with contrast performed on 5/28/2018 with concerning questionable lingular branch PE. Heparin held in event of acute drop in H/H on 5/29/2018 and heparin resumed am of 5/30/2018.   -- continue broad spectrum abx with vancomycin and meropenem covering for MRSA and Achromobacter xylogens per ID for +ve cultures from bronchial wash on EBUS and bronch on 5/22 and 5/24, respectively. -- Continue Solumedrol 80mg bid.  -- duonebs [id number] + 3% NS + cough assist device + use Aerobika with duonebs when appropriate.  -- holding lasix, patient received total of 120mg yesterday with lab findings c/w contraction alkalosis this am following ~5L diuresis over 24 hours.   -- intubated on 5/28/2018 and bronchoscopy revealing mucus plugging of left main bronchial system. extubated on 5/29/2018 following repeat bronchoscopy.   -- CT chest with contrast on 5/28/2018 revealed no mediastinal abscess.   -- decreasing vent settings this am given good SpO2 on monitor with plans for SBT and extubation later today.   -- up in chair and trial of mechanical assist device today.           Atelectasis    -- plan for possible thor today.         Mucus plugging of bronchi    -- as per acute hypoxic resp failure  -- s/p two bronchoscopies in [facility] last 5/29/2018.         Achromobacter pneumonia    -- continuing total of 7 days of Meropenem per ID today 5/7.         Severe persistent asthma    -- on Xolair and prednisone taper at home.   -- mx as above per hypoxic resp failure.   -- continue IV solumedrol at 80mg bid.  -- continue home spiriva, breo, and singulair        Cardiac/Vascular   Hypotension    -- likely due to intubation and being on sedation + hypovolemia due to blood loss.   -- was on levophed for 12 hours and weaned off 5/29/2018.  -- currently off pressors with good BP. Resuming verapamil 80mg [id number].         Mixed hyperlipidemia    -- continue home pravachol        Essential hypertension    -- BP has normalized  last 12 hours.   -- resume verapamil at 80mg q8h.          Renal/   Metabolic alkalosis    -- likely due to contraction alkalosis and upper GI losses with concomitant hypochloremia.        ID   ABPA (allergic bronchopulmonary aspergillosis)    -- s/p voriconazole and steroid taper treated at [facility] end of 2017.   -- continue Isavuconazonium per ID.        MRSA bacteremia     -- MRSA grew initially in urine, then blood, and finally in mediastinal collection.   -- SHEILA performed 5/8/2018 was negative for endocarditis.  -- hx of prolonged immunosuppression on Prednisone and Xolair predisposing patient to fungal infection.   -- continue IV Vancomycin for total of 6 weeks per ID beginning 5/25/2018 and follow up in ID clinic with weekly labs.   -- bronchial washing on 5/22/2018 grew MRSA.             Mediastinal collection    Bilateral pleural effusion     -- ill-defined mediastinal collection with mediastinal abscess, loculated pleural effusion, and complex pleural effusion are among the possible differentials.  -- GS and culture during EBUS from bronchial washing at OSH grew MRSA + Achromobacter.   -- had 3-4 FNA passes on EBUS that showed no malignancy. Apparently, no samples were obtained from mediastinal abscess during EBUS at OSH on 5/22/2018.  -- continue broad spectrum abx including vancomycin and Meropenem treating for MRSA bacteremia and achromobacter from BAL on 5/22 and 5/24.    -- repeat CT chest with contrast on 5/28/2018 here revealed no persistent mediastinal collection amenable to drainage.   -- abscess likely has improved following appropriate abx treatment. No need for surgical/IR intervention at this time.              Hematology   History of DVT of lower extremity    PE   -- hx of DVT in left lower extremity. Completed 3 month course of Eliquis late 2017.   -- CT with contrast on 5/28 showed poor lingular artery filling concerning for PE.   -- resuming heparin gtt this am with H/H over  correction suspicious for lab error + no clinical signs of over bleeding.  -- patient will likely need life-long AC.      Thrombocytopenia        -- slowly down trending plt to 138.  -- sending for HIT but proceeding with resuming the heparin gtt.   -- HIT sent, follow up on result.        Oncology   Hereditary spherocytosis    -- drop in H/H following 2 unit pRBC transfusion on 5/29 likely due to HS as evident by increased t bili to 1.3 from 0.4. Sending for LDH and haptoglobin to confirm this.         Endocrine   Impaired glucose tolerance    -- likely due to being on steroids chronically.   -- a1c 5.2, good poct glucose. d/c LDSSI.   -- initiate TF / diet today with impact peptide 1.5, appreciate nutrition rec's.        GI   Abnormal LFTs    -- likely due to drug adverse effect suspected from voriconazole. Switched to isavuconazonium in setting of conjunctival swelling now with improving LFT's.   -- LFT's improving. AST elevated this am likely correlating to hemolysis following 2 unit pRBC transfusion on 5/29/2018.         GERD (gastroesophageal reflux disease)    -- switch pepcid to enteral.              Critical Care Daily Checklist:    A: Awake: RASS Goal/Actual Goal: RASS Goal: 0-->alert and calm  Actual: Ricks Agitation Sedation Scale (RASS): Restless   B: Spontaneous Breathing Trial Performed?     C: SAT & SBT Coordinated?  N/a                      D: Delirium: CAM-ICU Overall CAM-ICU: Negative   E: Early Mobility Performed? yes   F: Feeding Goal: Goals: tolerate TF at Goal rate  Status: Nutrition Goal Status: new   Current Diet Order   Procedures    Diet NPO      AS: Analgesia/Sedation Prn, avoid CNS suppressing agents   T: Thromboembolic Prophylaxis Heparin gtt   H: HOB > 300 yes   U: Stress Ulcer Prophylaxis (if needed) pepcid   G: Glucose Control prn   B: Bowel Function Stool Occurrence: 0   I: Indwelling Catheter (Lines & Olivares) Necessity yes   D: De-escalation of Antimicrobials/Pharmacotherapies  Not today    Plan for the day/ETD Continue supportive care.    Code Status:  Family/Goals of Care: Full Code           Hien Pressley MD  Critical Care Medicine  [facility]

## 2018-05-30 NOTE — PLAN OF CARE
Problem: Patient Care Overview  Goal: Plan of Care Review  Outcome: Ongoing (interventions implemented as appropriate)  VSS overnight. Pt. Remains on comfort flow 60% and 40L. O2 sats > 92%. Pt. tachypnic but without c/o SOB. CPT performed per RT with ordered breathing treatments. Pt. Increasingly delirious overnight. Remains oriented x3, but with frequent repeated questions, and illogical statements. Pt reoriented frequently throughout shift. POC for continued monitoring of respiratory status and treatment of infection in CMICU. POC reviewed and discussed with pt. And family. All questions and concerns were addressed. Family verbalized understanding.

## 2018-05-30 NOTE — ASSESSMENT & PLAN NOTE
-- likely due to drug adverse effect suspected from voriconazole. Switched to isavuconazonium in setting of conjunctival swelling now with improving LFT's.   -- LFT's improving. AST elevated this am likely correlating to hemolysis following 2 unit pRBC transfusion on 5/29/2018.

## 2018-05-30 NOTE — NURSING
No feeding pumps on unit - ordered one via epic and unit secretary, Juanjo, will also try to get one

## 2018-05-30 NOTE — PLAN OF CARE
Problem: Patient Care Overview  Goal: Plan of Care Review  Outcome: Ongoing (interventions implemented as appropriate)  Pt remained intubated and sedated this morning.  Family at bedside.  CT abdomen done.  BLE doppler study done.  Bronch washing with removal of some thick secretions.  Fleets enema yielded moderate thin brown output.  Extubated to comfort flow at 1830.  Family at bedside for emotional support.  Pt very teary, fearful, anxious.  POC discussed.  Emotional support provided.  C/o chronic back pain.  Massage provided by family.  Plan to use home tens unit discussed.  Therapeutic lotion ordered for back.  SaO2 >93%.  No SOB.  Plan to continue to monitor.

## 2018-05-30 NOTE — PLAN OF CARE
Problem: Occupational Therapy Goal  Goal: Occupational Therapy Goal  Goals to be met by: 6/13/2018    Pt will complete supine>sit with mod A in prep for seated grooming task.  Pt will sit at EOB x10 minutes with min A for balance to wipe face with washcloth.  Pt will complete scooting along EOB with min A in prep for scoot pivot t/f to BSC.  Pt will complete sit>stand with mod A in prep for toilet transfer.  Pt will complete UB Dressing with min A   Outcome: Ongoing (interventions implemented as appropriate)  OT evaluation completed and POC initiated 5/30/2018. Pt would benefit from SNF at d/c in order to maximize independence and safety with ADLs and functional mobility.

## 2018-05-30 NOTE — PLAN OF CARE
Problem: Physical Therapy Goal  Goal: Physical Therapy Goal  Goals to be met by: 2018    Patient will increase functional independence with mobility by performin. Supine to sit with Moderate Assistance - not met  2. Sit to supine with Moderate Assistance - not met  3. Sit to stand transfer with Moderate Assistance using Rolling Walker - not met  4. Bed to chair transfer with Moderate Assistance using Rolling Walker - not met  5. Gait  x 50 feet with Moderate Assistance using Rolling Walker. - not met  6. Sitting at edge of bed x5 minutes with Moderate Assistance - not met  7. Lower extremity exercise program x10 reps per handout, with independence - not emt    Outcome: Ongoing (interventions implemented as appropriate)  Eval completed and goals appropriate.

## 2018-05-30 NOTE — SUBJECTIVE & OBJECTIVE
Interval History/Significant Events:   NAEON.  Extubated following repeat bronchoscopy yesterday. H/H stable since holding the heparin with no clear source of bleeding. Resuming heparin this am.  Delirious overnight with reportedly having little sleep.    Review of Systems   Unable to obtain given patient's factors.    Objective:     Vital Signs (Most Recent):  Temp: 97.7 °F (36.5 °C) (05/30/18 0300)  Pulse: 103 (05/30/18 0600)  Resp: (!) 37 (05/30/18 0600)  BP: 134/72 (05/30/18 0600)  SpO2: 99 % (05/30/18 0600) Vital Signs (24h Range):  Temp:  [97.7 °F (36.5 °C)-98.1 °F (36.7 °C)] 97.7 °F (36.5 °C)  Pulse:  [] 103  Resp:  [14-42] 37  SpO2:  [86 %-100 %] 99 %  BP: ()/(44-97) 134/72   Weight: 81.4 kg (179 lb 7.3 oz)  Body mass index is 32.82 kg/m².      Intake/Output Summary (Last 24 hours) at 05/30/18 0718  Last data filed at 05/30/18 0600   Gross per 24 hour   Intake           2424.5 ml   Output             5360 ml   Net          -2935.5 ml       Physical Exam  General: obese lady with Cushingoid facies, tachypnic. On CFNC. Delirious.   Eyes: chemosis improved with lateral conjunctival edema b/l, PERRL. EOMI.   Neck: supple, symmetrical, trachea midline, no JVD.  HENT: AT NC  Cardiovascular: Heart: tachycardic, regular rhythm, S1, S2 normal, no murmur, click, rub or gallop.  Lungs: slight ronchi heard dispersedly,   Chest Wall: no tenderness.  Abdomen/Rectal: Abdomen: Non distended. No BS. No masses. No TTP.   Extremities: 1+ pitting edema up to the ankles, no redness or tenderness in the calves or thighs. Pulses: 2+ and symmetric  Skin: Skin color, texture, turgor normal. No rashes or lesions  Musculoskeletal: full range of motion of joints.   Lymph Nodes: No cervical or supraclavicular adenopathy  Psych/Behavioral: Normal. Alert but disoriented       Lines/Drains/Airways     Peripherally Inserted Central Catheter Line                 PICC Double Lumen 05/24/18 right brachial 6 days          Drain                  NG/OG Tube 05/24/18 0000 6 days         Urethral Catheter 05/24/18 16 Fr. 6 days              Significant Labs:    CBC/Anemia Profile:    Recent Labs  Lab 05/29/18  0441 05/29/18  1612 05/30/18  0413   WBC 11.25 13.88* 10.28   HGB 5.7* 11.0* 10.3*   HCT 18.1* 33.3* 30.8*    167 138*   MCV 97 87 87   RDW 17.1* 19.6* 19.3*        Chemistries:    Recent Labs  Lab 05/29/18  0402 05/30/18  0413    144   K 3.4* 2.6*   CL 97 92*   CO2 37* 40*   BUN 27* 21*   CREATININE 0.7 0.6   CALCIUM 7.8* 7.9*   ALBUMIN 1.9* 2.3*   PROT 4.1* 4.9*   BILITOT 0.4 1.3*   ALKPHOS 266* 272*   * 167*   AST 36 56*

## 2018-05-30 NOTE — ASSESSMENT & PLAN NOTE
-- as per acute hypoxic resp failure  -- s/p two bronchoscopies in [facility] last 5/29/2018.

## 2018-05-30 NOTE — CONSULTS
NIAS to bedside for picc eval.  Able to aspirate both ports after flushing with 10 cc of normal saline.  RN notified.

## 2018-05-30 NOTE — ASSESSMENT & PLAN NOTE
-- Likely multifactorial with respiratory secretion, asthma exacerbation, and volume overload all likely to be contributing.   -- new PE findings on CT chest with contrast performed on 5/28/2018 with concerning questionable lingular branch PE. Heparin held in event of acute drop in H/H on 5/29/2018 and heparin resumed am of 5/30/2018.   -- continue broad spectrum abx with vancomycin and meropenem covering for MRSA and Achromobacter xylogens per ID for +ve cultures from bronchial wash on EBUS and bronch on 5/22 and 5/24, respectively. -- Continue Solumedrol 80mg bid.  -- duonebs [id number] + 3% NS + cough assist device + use Aerobika with duonebs when appropriate.  -- holding lasix, patient received total of 120mg yesterday with lab findings c/w contraction alkalosis this am following ~5L diuresis over 24 hours.   -- intubated on 5/28/2018 and bronchoscopy revealing mucus plugging of left main bronchial system. extubated on 5/29/2018 following repeat bronchoscopy.   -- CT chest with contrast on 5/28/2018 revealed no mediastinal abscess.   -- decreasing vent settings this am given good SpO2 on monitor with plans for SBT and extubation later today.

## 2018-05-30 NOTE — ASSESSMENT & PLAN NOTE
-- slowly down trending plt to 126 today  -- sending for HIT but proceeding with resuming the heparin gtt  -- 4T score of 4, intermediate. HIT Ab sent, will follow up on result

## 2018-05-30 NOTE — ASSESSMENT & PLAN NOTE
PE   -- hx of DVT in left lower extremity. Completed 3 month course of Eliquis late 2017.   -- CT with contrast on 5/28 showed poor lingular artery filling concerning for PE.   -- resuming heparin gtt this am with H/H over correction suspicious for lab error + no clinical signs of over bleeding.  -- patient will likely need life-long AC.

## 2018-05-30 NOTE — SUBJECTIVE & OBJECTIVE
Patient History           Medical as of 2018     Past Medical History     Diagnosis Date Comments Source    ABPA (allergic bronchopulmonary aspergillosis) -- -- Provider    Allergy -- -- Provider    Anxiety -- -- Provider    Arachnoid cyst -- monitoring Provider    Asthma -- -- Provider    COPD (chronic obstructive pulmonary disease) -- -- Provider    Hemoptysis -- -- Provider    Hypertension -- -- Provider    Tachycardia -- -- Provider    Thalamic disease -- thalamia minor Provider                  Surgical as of 2018     Past Surgical History     Procedure Laterality Date Comments Source     SECTION -- -- -- Provider    SINUS SURGERY -- -- -- Provider    HERNIA REPAIR -- -- -- Provider    DENTAL SURGERY -- -- -- Provider    INTRAUTERINE DEVICE INSERTION -- -- -- Provider    GALLBLADDER SURGERY -- -- -- Provider    CHOLECYSTECTOMY -- -- -- Provider    BRONCHOSCOPY -- 10/2016 [facility] Provider    BREAST SURGERY -- -- breast bx  Provider    COLONOSCOPY N/A 2017 Procedure: COLONOSCOPY;  Surgeon: Horacio Pelaez MD;  Location: [facility];  Service: General;  Laterality: N/A; Provider                  Family as of 2018     Problem Relation Name Age of Onset Comments Source    Atrial fibrillation Mother -- -- -- Provider    Diabetes Mother -- -- -- Provider    Hypertension Mother -- -- -- Provider    Hyperlipidemia Mother -- -- -- Provider    Hypertension Father -- -- -- Provider    Heart disease Father -- -- -- Provider    Hyperlipidemia Father -- -- -- Provider    Hypertension Sister 1 -- -- Provider            Tobacco Use as of 2018     Smoking Status Smoking Start Date Smoking Quit Date Packs/day Years Used    Never Smoker -- -- -- --    Types Comments Smokeless Tobacco Status Smokeless Tobacco Quit Date Source    -- -- Never Used -- Provider            Alcohol Use as of 2018     Alcohol Use Drinks/Week Alcohol/Week Comments Source    No 1 Glasses of wine 0.6 oz social occ  Provider            Drug Use as of 2018     Drug Use Types Frequency Comments Source    No -- -- -- Provider            Sexual Activity as of 2018     Sexually Active Birth Control Partners Comments Source    Yes -- Male -- Provider            Activities of Daily Living as of 2018    **None**           Social Documentation as of 2018    **None**           Occupational as of 2018    **None**           Socioeconomic as of 2018     Marital Status Spouse Name Number of Children Years Education Preferred Language Ethnicity Race Source     -- 3 -- English /White White Provider         Pertinent History Q A Comments    as of 2018 Lives with      Place in Birth Order      Lives in      Number of Siblings      Raised by      Legal Involvement      Childhood Trauma      Criminal History of      Financial Status      Highest Level of Education      Does patient have access to a firearm?       Service      Primary Leisure Activity      Spirituality       Past Medical History:   Diagnosis Date    ABPA (allergic bronchopulmonary aspergillosis)     Allergy     Anxiety     Arachnoid cyst     monitoring    Asthma     COPD (chronic obstructive pulmonary disease)     Hemoptysis     Hypertension     Tachycardia     Thalamic disease     thalamia minor     Past Surgical History:   Procedure Laterality Date    BREAST SURGERY      breast bx     BR[place]OSCOPY  10/2016    [place]     SECTION      CHOLECYSTECTOMY      COLONOSCOPY N/A 2017    Procedure: COLONOSCOPY;  Surgeon: Horacio Pelaez MD;  Location: [facility];  Service: General;  Laterality: N/A;    DENTAL SURGERY      GALLBLADDER SURGERY      HERNIA REPAIR      INTRAUTERINE DEVICE INSERTION      SINUS SURGERY       Family History     Problem Relation (Age of Onset)    Atrial fibrillation Mother    Diabetes Mother    Heart disease Father    Hyperlipidemia Mother, Father    Hypertension Mother,  "Father, Sister        Social History Main Topics    Smoking status: Never Smoker    Smokeless tobacco: Never Used    Alcohol use No      Comment: social occ    Drug use: No    Sexual activity: Yes     Partners: Male     Review of patient's allergies indicates:   Allergen Reactions    Neosporin (neomycin-polymyx) Rash     "I get a skin rash"    Sulfa (sulfonamide antibiotics) Anaphylaxis    Bactrim [sulfamethoxazole-trimethoprim]     Venlafaxine analogues     Pollen extracts Other (See Comments)     "allergic rhinitis mess"    Vfend [voriconazole] Other (See Comments)     Vision loss       No current facility-administered medications on file prior to encounter.      Current Outpatient Prescriptions on File Prior to Encounter   Medication Sig    albuterol (PROVENTIL) 2.5 mg /3 mL (0.083 %) nebulizer solution Take 2.5 mg by nebulization every 4 (four) hours as needed.    alprazolam (XANAX) 0.5 MG tablet Take 0.5 tablets by mouth daily as needed.     baclofen (LIORESAL) 10 MG tablet Take 10 mg by mouth 3 (three) times daily.    ciclesonide (ALVESCO) 160 mcg/actuation HFAA Inhale 1 puff into the lungs 2 (two) times daily. Controller    dapsone 100 MG Tab Take 100 mg by mouth once daily.    dexlansoprazole (DEXILANT) 60 mg capsule Take 60 mg by mouth once daily.    ergocalciferol (VITAMIN D2) 50,000 unit Cap Take 1 capsule (50,000 Units total) by mouth every 7 days.    escitalopram oxalate (LEXAPRO) 20 MG tablet Take 20 mg by mouth once daily.    fluticasone (FLONASE) 50 mcg/actuation nasal spray USE 1 SPRAY IN EACH NOSTRIL TWICE A DAY    fluticasone-salmeterol 100-50 mcg/dose (ADVAIR) 100-50 mcg/dose diskus inhaler Inhale 1 puff into the lungs 2 (two) times daily. Controller    guaifenesin (MUCINEX) 600 mg 12 hr tablet Take 1,200 mg by mouth 2 (two) times daily.     ipratropium (ATROVENT) 0.02 % nebulizer solution     LACTOBACILLUS ACIDOPHILUS (PROBIOTIC ACIDOPHILUS ORAL) Take 1 capsule by mouth 2 " "(two) times daily.    MIRALAX 17 gram/dose powder Take 17 g by mouth daily as needed.    montelukast (SINGULAIR) 10 mg tablet Take 10 mg by mouth once daily.    multivit-min-FA-herbal no.245 (ALIVE WOMEN'S GUMMY VITAMINS) 200 mcg- 37.5 mg Chew Take 2 capsules by mouth once daily.    omalizumab (XOLAIR) 150 mg injection Inject 150 mg into the skin every 14 (fourteen) days.    pravastatin (PRAVACHOL) 80 MG tablet Take 80 mg by mouth once daily.    predniSONE (DELTASONE) 10 MG tablet Take 30 mg in AM and 10 mg in PM    tiotropium bromide 1.25 mcg/actuation Mist Inhale 2.5 mcg into the lungs once daily. Controller    verapamil (VERELAN) 240 MG C24P Take 1 capsule by mouth once daily at 6am.     Psychotherapeutics     Start     Stop Route Frequency Ordered    05/26/18 0900  escitalopram oxalate tablet 20 mg      -- Oral Daily 05/25/18 2427    05/25/18 2249  ramelteon tablet 8 mg      -- Oral Nightly PRN 05/25/18 2150        Review of Systems  Strengths and Liabilities: Strength: Patient has positive support network., Liability: Patient has poor health.    Objective:     Vital Signs (Most Recent):  Temp: 98.2 °F (36.8 °C) (05/30/18 0744)  Pulse: 106 (05/30/18 0900)  Resp: (!) 34 (05/30/18 0900)  BP: (!) 154/95 (05/30/18 0900)  SpO2: 98 % (05/30/18 0900) Vital Signs (24h Range):  Temp:  [97.7 °F (36.5 °C)-98.2 °F (36.8 °C)] 98.2 °F (36.8 °C)  Pulse:  [] 106  Resp:  [14-42] 34  SpO2:  [86 %-100 %] 98 %  BP: ()/(44-97) 154/95     Height: 5' 2" (157.5 cm)  Weight: 81.4 kg (179 lb 7.3 oz)  Body mass index is 32.82 kg/m².      Intake/Output Summary (Last 24 hours) at 05/30/18 1048  Last data filed at 05/30/18 0841   Gross per 24 hour   Intake             1645 ml   Output             5215 ml   Net            -3570 ml       Physical Exam    Mental Status Exam  General appearance and behavior: tachypneic, NGT present, appears older than stated age, Disheveled, cooperative  Level of Consciousness: awake , " "alert  Attention: PANCHO with 3 errors  Orientation: intact to self, place, time, situation   Psychomotor Behavior: no retardation or agitation  Speech: not rapid or pressured, normal rate, emotional tone, normal articulation of speech  Language: prosody intact, spontaneous, non-spontaneous, and appropriate without evidence of neologisms or gross idiosyncrasies   Mood: "anxious"   Affect: mood congruent, slightly irritable  Thought Process: clear, logical, goal directed, without evidence of unwarranted suspicion, over generalization, or fragmentation   Associations: intact, no loosening of associations   Thought Content: denies suicidal/homicidal ideation, denies plan or intent for self harm or harm to others; no evidence of hallucinations, or delusions.  Somatically preoccupied.   Memory: registers 3/3, recalls 0/3  Fund of Knowledge: names 3/4 past presidents  Calculation/Concentration: WORLD forwards, DLOROW backwards  Insight:Pt has awareness of illness  Judgment:Behavior adequate for circumstances    Significant Labs:   Last 24 Hours:   Recent Lab Results       05/30/18  0413 05/29/18  1612      Immature Granulocytes 3.1(H) 3.7(H)     Immature Grans (Abs) 0.32  Comment:  Mild elevation in immature granulocytes is non specific and   can be seen in a variety of conditions including stress response,   acute inflammation, trauma and pregnancy. Correlation with other   laboratory and clinical findings is essential.  (H) 0.51  Comment:  Mild elevation in immature granulocytes is non specific and   can be seen in a variety of conditions including stress response,   acute inflammation, trauma and pregnancy. Correlation with other   laboratory and clinical findings is essential.  (H)     Albumin 2.3(L)      Alkaline Phosphatase 272(H)      (H)      Anion Gap 12      Aniso Slight      AST 56(H)      Baso # 0.03 0.05     Basophil% 0.3 0.4     Total Bilirubin 1.3  Comment:  For infants and newborns, " interpretation of results should be based  on gestational age, weight and in agreement with clinical  observations.  Premature Infant recommended reference ranges:  Up to 24 hours.............<8.0 mg/dL  Up to 48 hours............<12.0 mg/dL  3-5 days..................<15.0 mg/dL  6-29 days.................<15.0 mg/dL  (H)      BUN, Bld 21(H)      Calcium 7.9(L)      Chloride 92(L)      CO2 40(H)      Creatinine 0.6      Differential Method Automated Automated     eGFR if African American >60.0      eGFR if non  >60.0  Comment:  Calculation used to obtain the estimated glomerular filtration  rate (eGFR) is the CKD-EPI equation.         Eos # 0.0 0.0     Eosinophil% 0.0 0.0     Glucose 122(H)      Gran # (ANC) 9.6(H) 13.0(H)     Gran% 92.9(H) 93.3(H)     Hematocrit 30.8(L) 33.3(L)     Hemoglobin 10.3(L) 11.0(L)     Hypo Occasional      Lymph # 0.2(L) 0.2(L)     Lymph% 2.1(L) 1.2(L)     MCH 29.0 28.6     MCHC 33.4 33.0     MCV 87 87     Mono # 0.2(L) 0.2(L)     Mono% 1.6(L) 1.4(L)     MPV 10.8 10.6     nRBC 8(A) 8(A)     Ovalocytes Occasional      Platelets 138  Comment:  occasional platelet clumps(L)[C] 167     Poik Slight      Poly Occasional      Potassium 2.6  Comment:  *Critical value -  Results called to and read back by:bree gallardo rn  (LL)      Total Protein 4.9(L)      RBC 3.55(L) 3.84(L)     RDW 19.3(H) 19.6(H)     Sodium 144      Toxic Granulation Present      WBC 10.28 13.88(H)           Significant Imaging: I have reviewed all pertinent imaging results/findings within the past 24 hours.

## 2018-05-30 NOTE — ASSESSMENT & PLAN NOTE
Though patient oriented currently displays impairment in concentration and attention, recent nursing note states patient has been making illogical statements. Likely multifactorial given MRSA bacteremia, MRSA bacteriuria, MRSA/Achromobacter pneumonia, acute respiratory failure in setting of asthma/ABPA exacerbation. Delirium likely to worsen given poor respiratory effort and oxygenation post extubation.   - Recommend Zyprexa 2.5 mg PO qHS scheduled, also recommend Zyprexa 2.5 mg PO/IM PRN nonredirectable agitation, please obtain EKG to check QTc prior to administering any antipsychotic medications. Also recommend check ammonia level given elevated LFTs.  - Decreased Lexpro to 10 mg given decreased platelets  - Recommend discontinue Famotidine as this can contribute to delirium  · DELIRIUM BEHAVIOR MANAGEMENT  · PLEASE utilize CHEMICAL restraints with PRN meds first for agitation. Minimize use of PHYSICAL restraints  · Keep window shades open and room lit during day and room dim at night in order to promote normal sleep-wake cycles  · Encourage family at bedside. [place] patient often to situation, location, date.  · Continue to Limit or Discontinue use of Narcotics, Benzos and Anti-cholinergic medications as they may worsen delirium.  · Continue medical workup for causative etiology of Delirium.

## 2018-05-30 NOTE — PT/OT/SLP EVAL
Occupational Therapy   Co-Evaluation & Treatment Session     Name: Kamla Coulter  MRN: [id number]  Admitting Diagnosis:  Acute hypoxemic respiratory failure      Recommendations:     Discharge Recommendations: nursing facility, skilled  Discharge Equipment Recommendations:   (TBD pending further progression)  Barriers to discharge:  Decreased caregiver support (at current functional level)    History:     Occupational Profile:  Living Environment: Pt lives with spouse in a H with 0STE.   Previous level of function: PTA, pt was modified independent with functional mobility utilizing a rollator and was requiring some level of assistance with ADLs. Pt utilizes 02 all the time.   Roles and Routines: Pt is a wife.  Equipment Owned:  bedside commode, shower chair, rollator, 02  Assistance upon Discharge: Pt's spouse is able to assist.     Past Medical History:   Diagnosis Date    ABPA (allergic bronchopulmonary aspergillosis)     Allergy     Anxiety     Arachnoid cyst     monitoring    Asthma     COPD (chronic obstructive pulmonary disease)     Hemoptysis     Hypertension     Tachycardia     Thalamic disease     thalamia minor       Past Surgical History:   Procedure Laterality Date    BREAST SURGERY      breast bx     [place]  10/[place]    [place]     SECTION      CHOLECYSTECTOMY      COLONOSCOPY N/A 2017    Procedure: COLONOSCOPY;  Surgeon: Horacio Pelaez MD;  Location: [facility];  Service: General;  Laterality: N/A;    DENTAL SURGERY      GALLBLADDER SURGERY      HERNIA REPAIR      INTRAUTERINE DEVICE INSERTION      SINUS SURGERY         Subjective     Chief Complaint: discomfort in back   Patient/Family stated goals: return to PLOF  Communicated with: RN prior to session. Pt agreeable to OT treatment session.      Pain/Comfort:  · Pain Rating 1:  (pt not verbalizing pain when asked, however wincing in pain )  · Pain Addressed 1: Reposition, Distraction  · Pain Rating  Post-Intervention 1: 0/10    Patients cultural, spiritual, Voodoo conflicts given the current situation: none reported     Objective:     Patient found with: telemetry, PICC line, comfort flow, NG tube, BP cuff, continuous pulse-ox, urethral catheter    General Precautions: Standard, fall, NPO, contact   Orthopedic Precautions:N/A   Braces: N/A     Occupational Performance:    Bed Mobility:    · Supine>sit: total A x2 person assistance   · HOB slightly elevated    · Pt resisting on bringing trunk into upright position  · Scooting: total A x2 person assistance  · Anteriorly to EOB   · Superiorly towards HOB via draw sheet transfer   · Sit>supine: total A x2 person assistance  · Management of BLE's and trunk    Functional Mobility/Transfers:  · Pt not appropriate for transfers at this time d/t requiring max-total A for sitting balance     Activities of Daily Living:  · Grooming: mod A   · Pt able to grasp onto towel and bring to nasal region with assist required to wipe cheeks and forehead   · UB Dressing: total A   · To don back gown when seated at EOB   · Pt unable to attain full ROM of BUE's to complete task   · LB Dressing: total A  · Donning socks while in supine     Cognitive/Visual Perceptual:  Pt alert with fair insight into condition and with fair motivation to engage in therapy session. Pt able to follow single step commands, communicate verbally, and exhibits intact memory. Pt with impaired safety awareness. No visual deficits present.     Physical Exam:  Balance:  · Sitting: total A-max A while seated at EOB with BUE on bed; significant posterior lean present   · Standing: NT  Postural Examination: posterior pelvic tilt, rounded shoulders   Skin Integrity: thin and open wounds/tears on BLE's and BUE's   Edema: none noted   Sensation: pt reporting paraesthesia in BLE's   Dominant Hand: R-handed   UE ROM:  · Right: PROM WFL of shoulder; AROM WFL for elbow and hand  · Left: PROM WFL of shoulder; AROM WFL  "for elbow and hand  UE Strength:  · Right: NT  · Left: NT   Strength:  · Right: intact   · Left: intact   Fine Motor Control: intact opposite in b/l hands     Patient left HOB elevated with all lines intact, call button in reach and RN notified    [facility] 6 Click:  [facility] Total Score: 12    Treatment & Education:  · Pt seated at EOB for approx 10 minutes with max-total A required for sitting balance to complete grooming and formal assessment   · Pt requiring verbal and tactile cues to facilitate thoracic extension and anterior pelvic tilt   · Pt unable to maintain position when input and facilitation stopped   · HR fluctuating throughout session and increased to 140's  · Updated communication board  · Communicated OT POC  Education:    Assessment:     Kamla Coulter is a 57 y.o. female with a medical diagnosis of Acute hypoxemic respiratory failure. Performance deficits affecting function are weakness, impaired endurance, impaired self care skills, impaired functional mobilty, impaired balance, decreased coordination, decreased upper extremity function, decreased lower extremity function, decreased safety awareness, pain, impaired skin. Pt requiring total A for bed mobility, not appropriate on this date for transfers, total A for LB dressing, mod A for grooming, and total A for UB dressing. Pt would benefit from SNF to return to functional status closer to PLOF.     Rehab Prognosis:  good; patient would benefit from acute skilled OT services to address these deficits and reach maximum level of function.       Clinical Decision Making:     3.  OT High:  "Pt evaluation falls under high complexity for evaluation category due to 5+ performance deficits identified with comprehensive assessments and significant modifications/assistance required. An expanded review of history and occupational profile completed in addition to expanded review of physical, cognitive and psychosocial history. Several treatment options " "considered in care."     Plan:     Patient to be seen 4 x/week to address the above listed problems via self-care/home management, therapeutic activities, therapeutic exercises, neuromuscular re-education  · Plan of Care Expires: 06/27/18  · Plan of Care Reviewed with: patient    This Plan of care has been discussed with the patient who was involved in its development and understands and is in agreement with the identified goals and treatment plan    GOALS:    Occupational Therapy Goals        Problem: Occupational Therapy Goal    Goal Priority Disciplines Outcome Interventions   Occupational Therapy Goal     OT, PT/OT Ongoing (interventions implemented as appropriate)    Description:  Goals to be met by: 6/13/2018    Pt will complete supine>sit with mod A in prep for seated grooming task.  Pt will sit at EOB x10 minutes with min A for balance to wipe face with washcloth.  Pt will complete scooting along EOB with min A in prep for scoot pivot t/f to BSC.  Pt will complete sit>stand with mod A in prep for toilet transfer.  Pt will complete UB Dressing with min A                     Time Tracking:     OT Date of Treatment: 05/30/18  OT Start Time: 0909  OT Stop Time: 0934  OT Total Time (min): 25 min    Billable Minutes:Evaluation 17  Neuromuscular Re-education 8    Lindsey Flores, OT  5/30/2018    "

## 2018-05-30 NOTE — ASSESSMENT & PLAN NOTE
Bilateral pleural effusion     -- ill-defined mediastinal collection with mediastinal abscess, loculated pleural effusion, and complex pleural effusion are among the possible differentials.  -- GS and culture during EBUS from bronchial washing at OSH grew MRSA + Achromobacter.   -- had 3-4 FNA passes on EBUS that showed no malignancy. Apparently, no samples were obtained from mediastinal abscess during EBUS at OSH on 5/22/2018.  -- continue broad spectrum abx including vancomycin and Meropenem treating for MRSA bacteremia and achromobacter from BAL on 5/22 and 5/24.    -- repeat CT chest with contrast on 5/28/2018 here revealed no persistent mediastinal collection amenable to drainage.   -- abscess likely has improved following appropriate abx treatment. No need for surgical/IR intervention at this time.

## 2018-05-30 NOTE — NURSING
TAD Sanabria:  1) Reviewed labs that just resulted (1110 labs were from 1629 like others -drawn when PICC line finally aspirated). MD will place K IV orders.  2) Reviewed ST results - starting TF's as ordered.  3)  Will start Heparin gtt based on H/H results at 1629 as already ordered.

## 2018-05-30 NOTE — ASSESSMENT & PLAN NOTE
-- on Xolair and prednisone taper at home.   -- mx as above per hypoxic resp failure.   -- continue IV solumedrol at 80mg bid.  -- continue home spiriva, breo, and singulair

## 2018-05-30 NOTE — ASSESSMENT & PLAN NOTE
Pt with recent exacerbation of chronic generalized anxiety in setting of ICU hospitalization, recent intubation and respiratory failure. Patient currently prescribed Lexapro 20 mg by PCP, decreased to 10 mg given decreased platelets. Anxiety likely to improve as respiratory status and other medical conditions improve. Medication options limited given respiratory status and delirium, would thus avoid benzodiazepines and anticholinergics for anxiety. Recommend continue frequent support from care team as you are doing, may also consider SW consult for counseling and additional support.
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
## 2018-05-30 NOTE — ASSESSMENT & PLAN NOTE
-- MRSA grew initially in urine, then blood, and finally in mediastinal collection.   -- SHEILA performed 5/8/2018 was negative for endocarditis.  -- hx of prolonged immunosuppression on Prednisone and Xolair predisposing patient to fungal infection.   -- continue IV Vancomycin for total of 6 weeks per ID beginning 5/25/2018 and follow up in ID clinic with weekly labs.   -- bronchial washing on 5/22/2018 grew MRSA.

## 2018-05-30 NOTE — PLAN OF CARE
Problem: SLP Goal  Goal: SLP Goal  Speech Language Pathology Goals  Goals expected to be met by 6/6/2018  1. Pt will participate in ongoing swallow assessment     Outcome: Ongoing (interventions implemented as appropriate)  Bedside Swallow Study initiated. Patient with overt S/S aspiration with low-level trials presented. REC: continue NPO with alternative means nutrition/hydration/medicaiton and ST to continue to follow. Findings reviewed with Pt, family and nurse. Thank you.    LEONEL Pierson., [facility]  Speech-Language Pathology  Pager: [phone]  5/30/2018

## 2018-05-31 PROBLEM — T17.500A MUCUS PLUGGING OF BRONCHI: Status: RESOLVED | Noted: 2018-01-01 | Resolved: 2018-01-01

## 2018-05-31 PROBLEM — I95.9 HYPOTENSION: Status: RESOLVED | Noted: 2018-01-01 | Resolved: 2018-01-01

## 2018-05-31 NOTE — ASSESSMENT & PLAN NOTE
-- likely due to drug adverse effect suspected from voriconazole. Switched to isavuconazonium in setting of conjunctival swelling now with improving LFT's.   -- LFT's improving

## 2018-05-31 NOTE — PROGRESS NOTES
"  [facility]  Adult Nutrition  Consult Note    SUMMARY     Recommendations    1. Change TF regimen to Isosource 1.5 @ 50 mL/hr to meet 106% EEN, 100% EPN.    - Hold for residuals >500 mL; additional fluid per MD.   2. If able to advance diet, recommend regular diet (texture per SLP).   3. RD to monitor & follow-up.    Goals: Meet % EEN, EPN  Nutrition Goal Status: new  Communication of RD Recs: reviewed with RN    Reason for Assessment    Reason for Assessment: RD follow-up  Diagnosis:  (Acute hypoxemic respiratory failure )  Relevant Medical History: COPD, HTN  Interdisciplinary Rounds: attended    General Information Comments: Pt remains NPO, per ST recommendations. Tolerating current TF regimen via NGT.   Nutrition Discharge Planning: Unable to determine    Nutrition/Diet History    Patient Reported Diet/Restrictions/Preferences: general  Typical Food/Fluid Intake: adequate PTA  Food Preferences: none  Do you have any cultural, spiritual, [employer] conflicts, given your current situation?: none reported   Food Allergies: NKFA  Factors Affecting Nutritional Intake: NPO, difficulty/impaired swallowing    Anthropometrics    Temp: 98.6 °F (37 °C)  Height Method: Stated  Height: 5' 2" (157.5 cm)  Height (inches): 62 in  Weight Method: Bed Scale  Weight: 81.4 kg (179 lb 7.3 oz)  Weight (lb): 179.46 lb  Ideal Body Weight (IBW), Female: 110 lb  % Ideal Body Weight, Female (lb): 163.15 lb  BMI (Calculated): 32.9  BMI Grade: 30 - 34.9- obesity - grade I    Lab/Procedures/Meds    Pertinent Labs Reviewed: reviewed  Pertinent Labs Comments: Gluc 153  Pertinent Medications Reviewed: reviewed  Pertinent Medications Comments: Heparin    Physical Findings/Assessment    Overall Physical Appearance: overweight, weak  Tubes: nasogastric tube  Oral/Mouth Cavity: WDL  Skin: pressure ulcer(s)    Estimated/Assessed Needs    Weight Used For Calorie Calculations: 81.4 kg (179 lb 7.3 oz)     Energy Calorie " Requirements (kcal): 1703 kcal/d  Energy Need Method: [place]-St Jeor (1.25 PAL)     Protein Requirements: 81-98 g/d (1-1.2 g/kg)  Weight Used For Protein Calculations: 81.4 kg (179 lb 7.3 oz)     Fluid Requirements (mL): per MD     CHO Requirement: 50% total kcals    Nutrition Prescription Ordered    Current Diet Order: NPO  Current Nutrition Support Formula Ordered: Impact Peptide 1.5  Current Nutrition Support Rate Ordered: 30 mL/hr    Evaluation of Received Nutrient/Fluid Intake    Enteral Calories (kcal): 1080  Enteral Protein (gm): 68  Enteral (Free Water) Fluid (mL): 554    % Kcal Needs: 63%  % Protein Needs: 84%    Energy Calories Required: not meeting needs  Protein Required: not meeting needs  Fluid Required: other (see comments) (Per MD or 1 mL/kcal)    Comments: LBM: 5/31    Tolerance: tolerating    Nutrition Risk    Level of Risk/Frequency of Follow-up: high     Assessment and Plan    Acute hypoxemic respiratory failure     Nutrition Problem  increased nutrient needs     Related to (etiology):   Physiological needs 2/2 Acute Res Failure. NPO     Signs and Symptoms (as evidenced by):   Consult for TF rec. Currently no form of nutrition being administered     Nutrition Diagnosis Status:   Improving     Monitor and Evaluation    Food and Nutrient Intake: energy intake, food and beverage intake, enteral nutrition intake  Food and Nutrient Adminstration: diet order, enteral and parenteral nutrition administration  Physical Activity and Function: nutrition-related ADLs and IADLs  Anthropometric Measurements: weight, weight change  Biochemical Data, Medical Tests and Procedures: lipid profile, inflammatory profile, glucose/endocrine profile, gastrointestinal profile, electrolyte and renal panel  Nutrition-Focused Physical Findings: overall appearance     Nutrition Follow-Up    RD Follow-up?: Yes

## 2018-05-31 NOTE — HOSPITAL COURSE
"05/31/2018: Per chart, no Zyprexa ordered or given. Per RN note: "Less delirious but drowsy overnight. Awakens easily to voice or gentle touch. AAOx4." Per RN at bedside, patient spoke "nonsensically" last night about her parents saying "momma and daddy" but was less agitated than yesterday; was drowsy at times and concerned about CO2 levels. On interview, appears uncomfortable with eyes closed. Tachypneic and visibly uncomfortable, pt c/o SOB. Also reports she is tired but that she was able to sleep overnight. She states she is feeling anxious and wants to lay down.  "

## 2018-05-31 NOTE — PROGRESS NOTES
[facility]  Critical Care Medicine  Progress Note    Patient Name: Kamla Coulter  MRN: [id number]  Admission Date: 5/25/2018  Hospital Length of Stay: 6 days  Code Status: Full Code  Attending Provider: Mario Stallings MD  Primary Care Provider: Molina Alexandre MD   Principal Problem: Acute hypoxemic respiratory failure    Subjective:     HPI:    This is a 57 year old female with hx of severe persistent asthma on Xolair, prolonged immunosuppression on steroids, ABPA,bronchiectasis, HTN, DVT, and GERD who is transferred from [facility] for higher level of care. She initially presented to the OSH on May second for progressive shortness of breath, wheezing and orthopnea over a period of 2-3 days associated with low-grade fever. She was admitted to the hospital medicine floor and started on IV solumedrol 40mg bid and was placed on BiPAP. She was weaned off to NC on the second day of admission. Blood cultures from admission grew MRSA bacteremia and patient was started on vancomycin. Urine and sputum cultures both grew MRSA. The patient had persistent MRSA bacteremia despite being on vancomycin and a SHEILA was done on 5/8/2018 that was negative for endocarditis and revealed normal EF%. A bone scan on 5/10/2018 was similarly negative for an infection nidus. On 5/10/2018 the patient developed an acute hypoxic respiratory failure that did not improve promptly on BiPAP with an FiO2 of 100% and the patient was moved to the ICU and started empirically on therapeutic Lovenox. A CTA done on the same day revealed no PE but was notable for worsening bilateral pleural effusion and a non-specific infiltrate/collection around the mid-esophagus. The collection was thought to be a loculated pleural effusion. IV solumedrol was increased to 80mg twice daily and Lovenox was decreased to ppx dosing. By 5/12/2018 the patient's respiratory failure had improved and patient was weaned off of BiPAP to  nasal cannula. Sputum cultures collected on 5/13/2018 showed budding yeast and the patient was started on voriconazole. On 5/15/2018 the patient had worsening hypoxia and increased work of breathing and a CXR showed HAP and Ceftaroline was added to vancomycin. On 5/16/2018, repeat blood cultures showed persistent MRSA bacteremia and an Indium scan performed on 5/18/2018 revealed significant uptake by a collection located adjacent to the mid-esophagus. The patient was electively intubated on 5/22/2018 and an EGD revealed a whitish plaque at the proximal esophagus that was biopsied and resulted negative for malignant changes. Similarly, on the same day, an EBUS was performed and showed fluid collection posterior to the distal trachea that was better assessed through 5 FNA passes. Cultures from the EBUS grew MRSA + Alcaligenes/Achromobcater Xylosoxidens. However, FNA was negative for any malignancies. The patient was extubated the same day. Two days later (5/24/2018), the patient developed an acute decompensated respiratory failure with oxygen saturations dropping to the 60's and the patient was emergently intubated. CXR showed left lower lobe atelectasis. An emergent bronchoscopy revealed a mucus plug in the left mainstem bronchus. The mucus plug was brown, thick, and difficult to suction raising suspicion for Aspergillus infection. Following mucus plug disimpaction, the patient's respiratory status recovered immediately and the patient was extubated the next morning (5/25/2018). Prior to that a repeat CT chest showed persistent collection around the mid-esophagus. At this point, it was decided that the patient was better served at a higher level of care facility and the patient was [place] [facility] The patient arrived on BiPAP 10/5 and a 45% FiO2 with good oxygen saturation.       Hospital/ICU Course:  Admitted as a transfer from [facility] on 5/25/2018 for higher level of care. Patient required BiPAP on  admission but was able to wean to HFNC on second day of hospitalization. ID was consulted and had made changes to antimicrobials with continuing vancomycin, switching voriconazole to isavuconazonium and zosyn to meropenem. Blood cultures showed NGTD.  05/28/2018 Worsening respiratory status requiring intubation morning of 5/28/2018 for oxygen saturation in the lower 70's with emergent bronchoscopy done following intubation revealing left system mucus plugging. Following suctioning of mucus plugging oxygen saturation had finally improved. Repeat CT chest did not show mediastinal collection.    05/29/2018 drop in H/H requiring two units of pRBC. Heparin held. Plan for extubation today. Continuing diuresis and management of resp secretions. CT abdomen revealed no intraabdominal source of bleeding. Patient had repeat bronchoscopy and extubated to [facility].    05/30/2018 resuming heparin gtt and weaning oxygen requirement prn. Delirious overnight.           Interval History/Significant Events:     Ms. Coulter slept well overnight on CPAP. Yesterday afternoon she had a bowel movement with digital stimulation from nursing. Psych saw her for her anxiety and recommended supportive care with zyprexa PRN.     Review of Systems   Constitutional: Negative for chills, diaphoresis and fever.   HENT: Negative for rhinorrhea and sore throat.    Respiratory: Positive for shortness of breath and wheezing. Negative for cough.    Cardiovascular: Negative for chest pain and leg swelling.   Gastrointestinal: Negative for abdominal pain, diarrhea, nausea and vomiting.   Genitourinary: Negative for dysuria and hematuria.   Musculoskeletal: Negative for arthralgias and myalgias.   Skin: Negative for rash.   Neurological: Positive for weakness. Negative for headaches.   Psychiatric/Behavioral: Positive for confusion.     Objective:     Vital Signs (Most Recent):  Temp: 98.4 °F (36.9 °C) (05/31/18 0300)  Pulse: (!) 112 (05/31/18 0731)  Resp: (!) 24  (05/31/18 0731)  BP: (!) 141/67 (05/31/18 0600)  SpO2: 95 % (05/31/18 0731) Vital Signs (24h Range):  Temp:  [97.9 °F (36.6 °C)-98.7 °F (37.1 °C)] 98.4 °F (36.9 °C)  Pulse:  [100-140] 112  Resp:  [20-61] 24  SpO2:  [86 %-100 %] 95 %  BP: (119-164)/(61-95) 141/67   Weight: 81.4 kg (179 lb 7.3 oz)  Body mass index is 32.82 kg/m².      Intake/Output Summary (Last 24 hours) at 05/31/18 0736  Last data filed at 05/31/18 0632   Gross per 24 hour   Intake          1986.53 ml   Output             1190 ml   Net           796.53 ml       Physical Exam   Constitutional: No distress.   Chronically ill-appearing woman   HENT:   Head: Normocephalic and atraumatic.   Eyes: Conjunctivae and EOM are normal. Pupils are equal, round, and reactive to light.   Neck: Normal range of motion. Neck supple. No JVD present.   Cardiovascular: Regular rhythm.  Exam reveals no gallop and no friction rub.    No murmur heard.  Tachycardic  R. Brachial PICC in place   Pulmonary/Chest: Effort normal. No respiratory distress. She has wheezes (intermittent). She has rales (bilateral).   Abdominal: Soft. Bowel sounds are normal. She exhibits no distension and no mass. There is no tenderness.   Genitourinary:   Genitourinary Comments: Olivares in place   Musculoskeletal: Normal range of motion. She exhibits edema (1+ LE bilateral). She exhibits no tenderness.   Neurological: She is alert. No cranial nerve deficit.   Follows commands   Skin: Skin is warm and dry. No rash noted. No erythema.       Vents:  Vent Mode: Spont (05/29/18 1820)  Ventilator Initiated: Yes (05/28/18 0806)  Set Rate: 0 bmp (05/29/18 1820)  Vt Set: 400 mL (05/29/18 1820)  Pressure Support: 5 cmH20 (05/29/18 1820)  PEEP/CPAP: 5 cmH20 (05/29/18 1820)  Oxygen Concentration (%): 50 (05/31/18 0731)  Peak Airway Pressure: 11 cmH2O (05/29/18 1820)  Plateau Pressure: 0 cmH20 (05/29/18 1820)  Total Ve: 5.88 mL (05/29/18 1820)  F/VT Ratio<105 (RSBI): (!) 43.93 (05/29/18  "1820)  Lines/Drains/Airways     Peripherally Inserted Central Catheter Line                 PICC Double Lumen 05/24/18 right brachial 7 days          Drain                 NG/OG Tube 05/24/18 0000 7 days         Urethral Catheter 05/24/18 16 Fr. 7 days              Significant Labs:    CBC/Anemia Profile:    Recent Labs  Lab 05/30/18  0413 05/30/18  1110 05/31/18  0340   WBC 10.28 8.36 6.93   HGB 10.3* 9.8* 8.9*   HCT 30.8* 30.4* 27.3*   * 141* 126*   MCV 87 88 90   RDW 19.3* 19.4* 19.4*        Chemistries:    Recent Labs  Lab 05/30/18  0413 05/30/18  1629 05/31/18  0340    143 145   K 2.6* 3.4* 3.4*   CL 92* 96 100   CO2 40* 39* 36*   BUN 21* 19 20   CREATININE 0.6 0.6 0.6   CALCIUM 7.9* 7.9* 7.7*   ALBUMIN 2.3*  --  2.2*   PROT 4.9*  --  4.6*   BILITOT 1.3*  --  0.6   ALKPHOS 272*  --  241*   *  --  126*   AST 56*  --  55*   MG 1.8  --  2.0       Significant Imaging:    CXR 5/31  "1. No interval detrimental change.  2. Bilateral dependent pleural effusions with atelectasis or airspace consolidation of the adjacent lung." - per interpreting radiologist    Assessment/Plan:     Neuro   Delirium    - Some confusion noted this morning, improving now. Numerous possible etiologies  - Scheduling zyprexa  - see ELAINA above        Psychiatric   Generalized anxiety disorder    - Worsening this morning despite improvement yesterday without medications  - Scheduled zyprexa for a day. Will administer on PRN basis going foward  - Appreciate psych recommendations        Ophtho   Conjunctival edema of both eyes    -- likely due to voriconazole currently improving after switching to isavuconazonium.           Pulmonary   * Acute hypoxemic respiratory failure    -- Improving  -- Likely multifactorial with respiratory secretion, asthma exacerbation, and volume overload all likely to be contributing  -- new PE findings on CT chest with contrast performed on 5/28/2018 with concerning questionable lingular branch PE. " Heparin held in event of acute drop in H/H on 5/29/2018 and heparin resumed am of 5/30/2018.   -- continue broad spectrum abx with vancomycin and meropenem covering for MRSA and Achromobacter xylogens per ID for +ve cultures from bronchial wash on EBUS and bronch on 5/22 and 5/24, respectively.   -- Continue Solumedrol 80mg bid.  -- duonebs [id number] + 3% NS + cough assist device + use Aerobika with duonebs when appropriate.  -- intubated on 5/28/2018 and bronchoscopy revealing mucus plugging of left main bronchial system. extubated on 5/29/2018 following repeat bronchoscopy.   -- CT chest with contrast on 5/28/2018 revealed no drainable collection at site of former abscess  -- holding lasix, +781 mL total yesterday. Will match I/O  -- Extubated without complication. CPAP overnight        Atelectasis    -- stable  -- continue home vest during the day        Achromobacter pneumonia    -- continuing total of 7 days of Meropenem per ID today 6/7.         Severe persistent asthma    -- Stable  -- On Xolair and prednisone taper at home  -- continue solumedrol 80 mg IV BID  -- continue home spiriva, breo, and singulair  -- rest as described in hypoxic resp failure        Cardiac/Vascular   Mixed hyperlipidemia    -- Continue home pravastatin        Essential hypertension    -- Stable  -- Continue verapamil at 80 mg Q8H        Renal/   Metabolic alkalosis    -- Improving  -- likely due to contraction alkalosis and upper GI losses with concomitant hypochloremia        ID   MRSA bacteremia     -- MRSA grew initially in urine, then blood, and finally in mediastinal collection.   -- SHEILA performed 5/8/2018 was negative for endocarditis.  -- hx of prolonged immunosuppression on Prednisone and Xolair predisposing patient to fungal infection.   -- continue IV Vancomycin for total of 6 weeks per ID beginning 5/25/2018 and follow up in ID clinic with weekly labs.   -- bronchial washing on 5/22/2018 grew MRSA.           Mediastinal  collection    Bilateral pleural effusion     -- ill-defined mediastinal collection with mediastinal abscess, loculated pleural effusion, and complex pleural effusion are among the possible differentials.  -- GS and culture during EBUS from bronchial washing at OSH grew MRSA + Achromobacter.   -- had 3-4 FNA passes on EBUS that showed no malignancy. Apparently, no samples were obtained from mediastinal abscess during EBUS at OSH on 5/22/2018.  -- continue broad spectrum abx including vancomycin and Meropenem treating for MRSA bacteremia and achromobacter from BAL on 5/22 and 5/24.    -- repeat CT chest with contrast on 5/28/2018 here revealed no persistent mediastinal collection amenable to drainage.   -- abscess likely has improved following appropriate abx treatment. No need for surgical/IR intervention at this time.              ABPA (allergic bronchopulmonary aspergillosis)    -- s/p voriconazole and steroid taper treated at [facility] end of 2017. Complicated by vision loss  -- continue isavuconazonium per ID        Hematology   Thrombocytopenia    -- slowly down trending plt to 126 today  -- sending for HIT but proceeding with resuming the heparin gtt  -- 4T score of 4, intermediate. HIT Ab sent, will follow up on result        History of DVT of lower extremity    -- hx of DVT in left lower extremity. Completed 3 month course of Eliquis late 2017   -- CT with contrast on 5/28 showed poor lingular artery filling concerning for PE  -- Continue hep gtt  -- needs life-long AC.         Oncology   Hereditary spherocytosis    -- Stable  -- Hemoglobin 8.9 today  -- Haptoglobin > 250 at time of anemia re-assuring        Endocrine   Impaired glucose tolerance    -- Stable  -- likely due to being on steroids chronically.   -- a1c 5.2, good poct glucose measurements earlier in admission  -- initiate TF / diet today with impact peptide 1.5, appreciate nutrition rec's.        GI   Abnormal LFTs    -- likely due to drug  adverse effect suspected from voriconazole. Switched to isavuconazonium in setting of conjunctival swelling now with improving LFT's.   -- LFT's improving        GERD (gastroesophageal reflux disease)    -- Stable  -- Continue famotidine             Critical Care Daily Checklist:    A: Awake: RASS Goal/Actual Goal: RASS Goal: 0-->alert and calm  Actual: Ricks Agitation Sedation Scale (RASS): Alert and calm   B: Spontaneous Breathing Trial Performed?     C: SAT & SBT Coordinated?  Yes                      D: Delirium: CAM-ICU Overall CAM-ICU: Negative   E: Early Mobility Performed? Yes   F: Feeding Goal: Goals: Meet % EEN, EPN  Status: Nutrition Goal Status: new   Current Diet Order   Procedures    Diet NPO      AS: Analgesia/Sedation Scheduled zyprexa,    T: Thromboembolic Prophylaxis None   H: HOB > 300 Yes   U: Stress Ulcer Prophylaxis (if needed) famotidine   G: Glucose Control Controlled off meds   B: Bowel Function Stool Occurrence: 0   I: Indwelling Catheter (Lines & Olivares) Necessity indicated   D: De-escalation of Antimicrobials/Pharmacotherapies Not indicated    Plan for the day/ETD Supportive care    Code Status:  Family/Goals of Care: Full Code       Critical secondary to Patient has a condition that poses threat to life and bodily function: respiratory failure     Critical care was time spent personally by me on the following activities: development of treatment plan with patient or surrogate and bedside caregivers, discussions with consultants, evaluation of patient's response to treatment, examination of patient, ordering and performing treatments and interventions, ordering and review of laboratory studies, ordering and review of radiographic studies, pulse oximetry, re-evaluation of patient's condition. This critical care time did not overlap with that of any other provider or involve time for any procedures.     Leandro Sanabria MD  Critical Care Medicine  [facility]
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
## 2018-05-31 NOTE — ASSESSMENT & PLAN NOTE
-- Stable  -- On Xolair and prednisone taper at home  -- continue solumedrol 80 mg IV BID  -- continue home spiriva, breo, and singulair  -- rest as described in hypoxic resp failure

## 2018-05-31 NOTE — ASSESSMENT & PLAN NOTE
Pt with recent exacerbation of chronic generalized anxiety in setting of ICU hospitalization, recent intubation and respiratory failure. Patient currently prescribed Lexapro 20 mg by PCP, decreased Lexpro to 5 mg given continued decreased platelets. Anxiety likely to improve as respiratory status and other medical conditions improve. Medication options limited given respiratory status and delirium, would thus avoid benzodiazepines and anticholinergics for anxiety. Recommend continue frequent support from care team as you are doing, may also consider SW consult for counseling and additional support.

## 2018-05-31 NOTE — SUBJECTIVE & OBJECTIVE
"Interval History: see hospital course    Family History     Problem Relation (Age of Onset)    Atrial fibrillation Mother    Diabetes Mother    Heart disease Father    Hyperlipidemia Mother, Father    Hypertension Mother, Father, Sister        Social History Main Topics    Smoking status: Never Smoker    Smokeless tobacco: Never Used    Alcohol use No      Comment: social occ    Drug use: No    Sexual activity: Yes     Partners: Male     Psychotherapeutics     Start     Stop Route Frequency Ordered    06/01/18 0900  escitalopram oxalate tablet 5 mg      -- PER NG TUBE Daily 05/31/18 1023    05/25/18 2249  ramelteon tablet 8 mg      -- Oral Nightly PRN 05/25/18 2150           Review of Systems  Objective:     Vital Signs (Most Recent):  Temp: 98.6 °F (37 °C) (05/31/18 0731)  Pulse: (!) 127 (05/31/18 0900)  Resp: (!) 34 (05/31/18 0900)  BP: (!) 137/95 (05/31/18 0900)  SpO2: (!) 94 % (05/31/18 0900) Vital Signs (24h Range):  Temp:  [97.9 °F (36.6 °C)-98.7 °F (37.1 °C)] 98.6 °F (37 °C)  Pulse:  [100-130] 127  Resp:  [20-61] 34  SpO2:  [92 %-100 %] 94 %  BP: (119-164)/(61-95) 137/95     Height: 5' 2" (157.5 cm)  Weight: 81.4 kg (179 lb 7.3 oz)  Body mass index is 32.82 kg/m².      Intake/Output Summary (Last 24 hours) at 05/31/18 1030  Last data filed at 05/31/18 0900   Gross per 24 hour   Intake          2112.94 ml   Output             1145 ml   Net           967.94 ml       Physical Exam    Mental Status Exam:  Appearance: older than stated age, sitting up in bed, in mild distress  Behavior/Cooperation:  minimally cooperative 2/2 SOB, eye contact minimal (pt keeps closing eyes)  Speech:  nonspontaneous, increased latency of response, soft, impoverished  Language: grossly intact, able to name, able to repeat with spontaneous speech  Mood: "tired and anxious"  Affect:  congruent with stated mood   Thought Process: logical  Thought Content: normal, no suicidality, no homicidality, delusions, or paranoia  Sensorium:  " Somnolent  Alert and Oriented: oriented to person, place, disoriented to time, situation  Memory: 3/3 immediate, 0/3 at 5 minutes   Attention/concentration: SAVEAHAART w 3 errors; able to spell w-o-r-l-d but unable to spell backwards   Insight:  impaired  Judgment: Appropriate to situation    CAM ICU- POSITIVE    Significant Labs:   Last 24 Hours:   Recent Lab Results       05/31/18  0733 05/31/18  0729 05/31/18  0340 05/31/18  0015 05/30/18  1629      Immature Granulocytes   1.0(H)       Immature Grans (Abs)   0.07  Comment:  Mild elevation in immature granulocytes is non specific and   can be seen in a variety of conditions including stress response,   acute inflammation, trauma and pregnancy. Correlation with other   laboratory and clinical findings is essential.  (H)       Albumin   2.2(L)       Alkaline Phosphatase   241(H)       Allens Test N/A         ALT   126(H)       Anion Gap   9  8     Aniso   Slight       AST   55(H)       Baso #   0.01       Basophil%   0.1       Total Bilirubin   0.6  Comment:  For infants and newborns, interpretation of results should be based  on gestational age, weight and in agreement with clinical  observations.  Premature Infant recommended reference ranges:  Up to 24 hours.............<8.0 mg/dL  Up to 48 hours............<12.0 mg/dL  3-5 days..................<15.0 mg/dL  6-29 days.................<15.0 mg/dL         Site Other         BUN, Bld   20  19     Calcium   7.7(L)  7.9(L)     Chloride   100  96     CO2   36(H)  39(H)     Creatinine   0.6  0.6     DelSys Nasal Can         Differential Method   Automated       eGFR if    >60.0  >60.0     eGFR if non    >60.0  Comment:  Calculation used to obtain the estimated glomerular filtration  rate (eGFR) is the CKD-EPI equation.     >60.0  Comment:  Calculation used to obtain the estimated glomerular filtration  rate (eGFR) is the CKD-EPI equation.        Eos #   0.0       Eosinophil%   0.0        FiO2 50         Flow 35         Glucose   153(H)  103     Gran # (ANC)   6.7       Gran%   97.0(H)       Hematocrit   27.3(L)       Hemoglobin   8.9(L)       Heparin Anti-Xa  0.97  Comment:  Expected therapeutic range for Unfractionated heparin (UFH)  is 0.3-0.7 IU/mL.  The therapeutic range for low molecular weight heparins   (LMWH) varies with the type and , but is   typically between 0.4 and 1.1 IU/mL.  (H)  1.04  Comment:  Expected therapeutic range for Unfractionated heparin (UFH)  is 0.3-0.7 IU/mL.  The therapeutic range for low molecular weight heparins   (LMWH) varies with the type and , but is   typically between 0.4 and 1.1 IU/mL.  (H) 0.13  Comment:  Expected therapeutic range for Unfractionated heparin (UFH)  is 0.3-0.7 IU/mL.  The therapeutic range for low molecular weight heparins   (LMWH) varies with the type and , but is   typically between 0.4 and 1.1 IU/mL.  (L)     Hypo   Occasional       LD          Lymph #   0.0(L)       Lymph%   0.3(L)       Magnesium   2.0       MCH   29.4       MCHC   32.6       MCV   90       Mode SPONT         Mono #   0.1(L)       Mono%   1.6(L)       MPV   11.8       nRBC   5(A)       Ovalocytes   Occasional       Platelet Estimate          Platelets   126(L)       POC BE 17         POC HCO3 39.7(H)         POC PCO2 48.1(H)         POC PH 7.525(H)         POC PO2 25(LL)         POC SATURATED O2 52(L)         POC TCO2 41(H)         Poik   Slight       Poly   Occasional       Potassium   3.4(L)  3.4(L)     Total Protein   4.6(L)       RBC   3.03(L)       RDW   19.4(H)       Sample VENOUS         Sodium   145  143     Toxic Granulation   Present       WBC   6.93                   05/30/18  1112 05/30/18  1110      Immature Granulocytes  1.6(H)     Immature Grans (Abs)  0.13  Comment:  Mild elevation in immature granulocytes is non specific and   can be seen in a variety of conditions including stress response,   acute inflammation, trauma  and pregnancy. Correlation with other   laboratory and clinical findings is essential.  (H)     Albumin       Alkaline Phosphatase       Allens Test       ALT       Anion Gap       Aniso  Slight     AST       Baso #  0.01     Basophil%  0.1     Total Bilirubin       Site       BUN, Bld       Calcium       Chloride       CO2       Creatinine       DelSys       Differential Method  Automated     eGFR if        eGFR if non        Eos #  0.0     Eosinophil%  0.0     FiO2       Flow       Glucose       Gran # (ANC)  8.0(H)     Gran%  95.1(H)     Hematocrit  30.4(L)     Hemoglobin  9.8(L)     Heparin Anti-Xa       Hypo  Occasional       Comment:  Results are increased in hemolyzed samples.(H)      Lymph #  0.1(L)     Lymph%  1.4(L)     Magnesium       MCH  28.5     MCHC  32.2     MCV  88     Mode       Mono #  0.2(L)     Mono%  1.8(L)     MPV  11.2     nRBC  7(A)     Ovalocytes  Occasional     Platelet Estimate  Decreased(A)     Platelets  141(L)     POC BE       POC HCO3       POC PCO2       POC PH       POC PO2       POC SATURATED O2       POC TCO2       Poik  Slight     Poly  Occasional     Potassium       Total Protein       RBC  3.44(L)     RDW  19.4(H)     Sample       Sodium       Toxic Granulation  Present     WBC  8.36           Significant Imaging: I have reviewed all pertinent imaging results/findings within the past 24 hours.

## 2018-05-31 NOTE — ASSESSMENT & PLAN NOTE
-- Improving  -- likely due to contraction alkalosis and upper GI losses with concomitant hypochloremia

## 2018-05-31 NOTE — PT/OT/SLP PROGRESS
"Speech Language Pathology Treatment    Patient Name:  Kamla Coulter   MRN:  [id number]  Admitting Diagnosis: Acute hypoxemic respiratory failure  The primary encounter diagnosis was Essential hypertension. Diagnoses of Respiratory failure, Acute hypoxemic respiratory failure, ABPA (allergic bronchopulmonary aspergillosis), Mucus plugging of bronchi, and Atelectasis were also pertinent to this visit.    Recommendations:                 General Recommendations:  Dysphagia therapy  Diet recommendations:  NPO, Liquid Diet Level: NPO   Aspiration Precautions: Alternate means of nutrition/hydration and Strict aspiration precautions   General Precautions: Standard, aspiration, contact, respiratory  Communication strategies:  none    Subjective     SLP reviews pt with nurse pre/post session  Pt presents lethargic  Pt says "I want more [ice] chips"  Pt denies pain    Pain/Comfort:  · Pain Rating 1: 0/10  · Pain Rating Post-Intervention 1: 0/10    Objective:     Has the patient been evaluated by SLP for swallowing?   Yes  Keep patient NPO? No   Current Respiratory Status: nasal cannula (HFNC)    CXR 5/31/18: 1. No interval detrimental change.  2. Bilateral dependent pleural effusions with atelectasis or airspace consolidation of the adjacent lung.    Pt seen for ongoing swallow assessment. Pt found asleep in bed, nurse at bedside. HOB elevated. Pt with clear vocal quality, reduced intensity. Pt with generalized labial/lingual weakness upon review of the oral mechanism. Pt with High-Flow Nasal Canula at 40%, 32L. Pt seen with presentation of ice chips x2. Pt with drop in SpO2 to 89% with trial x1. Pt lethargic and requiring consistent, maximum cueing to attend to SLP. Additional trials held 2/2 lethargy, oxygenation requirements and aspiration risk. Pt educated on SLP role, aspiration precautions and need for further assessment prior to initiation of PO intake. Patient with partial verbal understanding. No further " questions noted. Whiteboard updated. Findings reviewed with nurse following session.     Assessment:     Kamla Coulter is a 57 y.o. female with an SLP diagnosis of Dysphagia.  She presents with high aspiration risk due to lethargy and high oxygenation requirements. ST to continue to follow to assess safety/feasibility of re-initiation of PO diet.     Goals:    SLP Goals        Problem: SLP Goal    Goal Priority Disciplines Outcome   SLP Goal     SLP Ongoing (interventions implemented as appropriate)   Description:  Speech Language Pathology Goals  Goals expected to be met by 6/6/2018  1. Pt will participate in ongoing swallow assessment                       Plan:     · Patient to be seen:  4 x/week   · Plan of Care expires:  06/29/18  · Plan of Care reviewed with:  patient   · SLP Follow-Up:  Yes       Discharge recommendations:  nursing facility, skilled   Barriers to Discharge:  Level of Skilled Assistance Needed     Time Tracking:     SLP Treatment Date:   05/31/18  Speech Start Time:  1253  Speech Stop Time:  1306     Speech Total Time (min):  13 min    Billable Minutes: Treatment Swallowing Dysfunction 13     KUSH Pierson, [facility]-SLP  Speech-Language Pathology  Pager: [phone]      05/31/2018

## 2018-05-31 NOTE — MEDICAL/APP STUDENT
"5/31/2018 9:12 AM   Kamla Coulter   1960   [id number]        PSYCHIATRY CONSULT PROGRESS NOTE       BRIEF HPI   Kamla Coulter is a 57 y.o. Female with a PMH of persistent asthma, immunosuppression, ABPA, bronchiectasis, HTN, DVT, and GERD. She has a PPH of anxiety and is currently presenting with acute hypoxemic respiratory failure. Psychiatry was originally consulted to address the patient's anxiety and symptoms of delirium.    Upon interviewing the patient, the pt is sitting up in bed, is somnolent, and appears anxious but is cooperative and answers my questions. She states she is tired and calls for her  when he is not there. Her MMSE is 16, and she performs SAVEAHAART with 3 errors. She is oriented to Person and Place but not to Time. She is less attentive and has trouble following commands as compared to yesterday morning. For example, when asked to spell "world" backwards, she continues to spell it 3x forwards. When asked to write a sentence or to copy a picture, pt scribbles incoherently.         ASSESSMENT   Delirium  Unspecified anxiety      RECOMMENDATIONS      · PSYCH Meds-    Lexapro 5 mg daily   Start Zyprexa 2.5 mg q6   Ramelteon 8mg PRN     · Legal status-  · The above will be discussed with staff psychiatrist and update any changes after rounds in the afternoon.  · Thank you for this consult will continue to follow      ----------------------------------------------------------------------------------------------------------------------  SUBJECTIVE:     Nursing note    Per Day Nurse, Pt has been less agitated and slept overnight. However, she reports the pt seems more anxious today which is possibly due to having her sit up instead of letting her lay down. She reports the pt has been calling out "Daddy".     Today,  Pt reports she is tired but that she was able to sleep overnight. She states she is feeling anxious and wants to lay down.         Current Medications:   Scheduled " "Meds:    albuterol-ipratropium  3 mL Nebulization Q4H    alteplase  2 mg Intra-Catheter Once    baclofen  5 mg Oral TID    dapsone  100 mg Oral Daily    diclofenac sodium   Topical (Top) BID    escitalopram oxalate  10 mg Per NG tube Daily    famotidine  20 mg Oral BID    guaifenesin 100 mg/5 ml  400 mg Per NG tube Q4H    custom IVPB builder  372 mg Intravenous Q24H    lactulose  20 g Per NG tube TID    meropenem (MERREM) IVPB  2 g Intravenous Q8H    methylPREDNISolone sodium succinate  80 mg Intravenous Q12H    montelukast  10 mg Oral Daily    polyethylene glycol  17 g Per G Tube QID    pravastatin  80 mg Oral Daily    sodium chloride 3%  4 mL Nebulization BID    vancomycin (VANCOCIN) IVPB  1,250 mg Intravenous Q24H      PRN Meds: acetaminophen, artificial tears, ondansetron, promethazine, ramelteon, sodium chloride 0.9%, sodium chloride 3%   Psychotherapeutics     Start     Stop Route Frequency Ordered    05/31/18 0900  escitalopram oxalate tablet 10 mg      -- PER NG TUBE Daily 05/30/18 1339    05/25/18 2249  ramelteon tablet 8 mg      -- Oral Nightly PRN 05/25/18 2150          Allergies:   Review of patient's allergies indicates:   Allergen Reactions    Neosporin (neomycin-polymyx) Rash     "I get a skin rash"    Sulfa (sulfonamide antibiotics) Anaphylaxis    Bactrim [sulfamethoxazole-trimethoprim]     Venlafaxine analogues     Pollen extracts Other (See Comments)     "allergic rhinitis mess"    Vfend [voriconazole] Other (See Comments)     Vision loss        Past Medical History:   Diagnosis Date    ABPA (allergic bronchopulmonary aspergillosis)     Allergy     Anxiety     Arachnoid cyst     monitoring    Asthma     COPD (chronic obstructive pulmonary disease)     Hemoptysis     Hypertension     Tachycardia     Thalamic disease     thalamia minor       OBJECTIVE:   Vitals   Vitals:    05/31/18 0731   BP:    Pulse: (!) 112   Resp: (!) 24   Temp:         Labs/Imaging/Studies: "   Recent Results (from the past 36 hour(s))   Comprehensive metabolic panel    Collection Time: 05/30/18  4:13 AM   Result Value Ref Range    Sodium 144 136 - 145 mmol/L    Potassium 2.6 (LL) 3.5 - 5.1 mmol/L    Chloride 92 (L) 95 - 110 mmol/L    CO2 40 (H) 23 - 29 mmol/L    Glucose 122 (H) 70 - 110 mg/dL    BUN, Bld 21 (H) 6 - 20 mg/dL    Creatinine 0.6 0.5 - 1.4 mg/dL    Calcium 7.9 (L) 8.7 - 10.5 mg/dL    Total Protein 4.9 (L) 6.0 - 8.4 g/dL    Albumin 2.3 (L) 3.5 - 5.2 g/dL    Total Bilirubin 1.3 (H) 0.1 - 1.0 mg/dL    Alkaline Phosphatase 272 (H) 55 - 135 U/L    AST 56 (H) 10 - 40 U/L     (H) 10 - 44 U/L    Anion Gap 12 8 - 16 mmol/L    eGFR if African American >60.0 >60 mL/min/1.73 m^2    eGFR if non African American >60.0 >60 mL/min/1.73 m^2   CBC auto differential    Collection Time: 05/30/18  4:13 AM   Result Value Ref Range    WBC 10.28 3.90 - 12.70 K/uL    RBC 3.55 (L) 4.00 - 5.40 M/uL    Hemoglobin 10.3 (L) 12.0 - 16.0 g/dL    Hematocrit 30.8 (L) 37.0 - 48.5 %    MCV 87 82 - 98 fL    MCH 29.0 27.0 - 31.0 pg    MCHC 33.4 32.0 - 36.0 g/dL    RDW 19.3 (H) 11.5 - 14.5 %    Platelets 138 (L) 150 - 350 K/uL    MPV 10.8 9.2 - 12.9 fL    Immature Granulocytes 3.1 (H) 0.0 - 0.5 %    Gran # (ANC) 9.6 (H) 1.8 - 7.7 K/uL    Immature Grans (Abs) 0.32 (H) 0.00 - 0.04 K/uL    Lymph # 0.2 (L) 1.0 - 4.8 K/uL    Mono # 0.2 (L) 0.3 - 1.0 K/uL    Eos # 0.0 0.0 - 0.5 K/uL    Baso # 0.03 0.00 - 0.20 K/uL    nRBC 8 (A) 0 /100 WBC    Gran% 92.9 (H) 38.0 - 73.0 %    Lymph% 2.1 (L) 18.0 - 48.0 %    Mono% 1.6 (L) 4.0 - 15.0 %    Eosinophil% 0.0 0.0 - 8.0 %    Basophil% 0.3 0.0 - 1.9 %    Aniso Slight     Poik Slight     Poly Occasional     Hypo Occasional     Ovalocytes Occasional     Toxic Granulation Present     Differential Method Automated    Magnesium    Collection Time: 05/30/18  4:13 AM   Result Value Ref Range    Magnesium 1.8 1.6 - 2.6 mg/dL   Haptoglobin    Collection Time: 05/30/18  4:13 AM   Result Value Ref  Range    Haptoglobin 273 (H) 30 - 250 mg/dL   CBC auto differential    Collection Time: 05/30/18 11:10 AM   Result Value Ref Range    WBC 8.36 3.90 - 12.70 K/uL    RBC 3.44 (L) 4.00 - 5.40 M/uL    Hemoglobin 9.8 (L) 12.0 - 16.0 g/dL    Hematocrit 30.4 (L) 37.0 - 48.5 %    MCV 88 82 - 98 fL    MCH 28.5 27.0 - 31.0 pg    MCHC 32.2 32.0 - 36.0 g/dL    RDW 19.4 (H) 11.5 - 14.5 %    Platelets 141 (L) 150 - 350 K/uL    MPV 11.2 9.2 - 12.9 fL    Immature Granulocytes 1.6 (H) 0.0 - 0.5 %    Gran # (ANC) 8.0 (H) 1.8 - 7.7 K/uL    Immature Grans (Abs) 0.13 (H) 0.00 - 0.04 K/uL    Lymph # 0.1 (L) 1.0 - 4.8 K/uL    Mono # 0.2 (L) 0.3 - 1.0 K/uL    Eos # 0.0 0.0 - 0.5 K/uL    Baso # 0.01 0.00 - 0.20 K/uL    nRBC 7 (A) 0 /100 WBC    Gran% 95.1 (H) 38.0 - 73.0 %    Lymph% 1.4 (L) 18.0 - 48.0 %    Mono% 1.8 (L) 4.0 - 15.0 %    Eosinophil% 0.0 0.0 - 8.0 %    Basophil% 0.1 0.0 - 1.9 %    Platelet Estimate Decreased (A)     Aniso Slight     Poik Slight     Poly Occasional     Hypo Occasional     Ovalocytes Occasional     Toxic Granulation Present     Differential Method Automated    Lactate dehydrogenase    Collection Time: 05/30/18 11:12 AM   Result Value Ref Range     (H) 110 - 260 U/L   Basic metabolic panel    Collection Time: 05/30/18  4:29 PM   Result Value Ref Range    Sodium 143 136 - 145 mmol/L    Potassium 3.4 (L) 3.5 - 5.1 mmol/L    Chloride 96 95 - 110 mmol/L    CO2 39 (H) 23 - 29 mmol/L    Glucose 103 70 - 110 mg/dL    BUN, Bld 19 6 - 20 mg/dL    Creatinine 0.6 0.5 - 1.4 mg/dL    Calcium 7.9 (L) 8.7 - 10.5 mg/dL    Anion Gap 8 8 - 16 mmol/L    eGFR if African American >60.0 >60 mL/min/1.73 m^2    eGFR if non African American >60.0 >60 mL/min/1.73 m^2   Anti-Xa Heparin Monitoring    Collection Time: 05/30/18  4:29 PM   Result Value Ref Range    Heparin Anti-Xa 0.13 (L) 0.30 - 0.70 IU/mL   Anti-Xa Heparin Monitoring    Collection Time: 05/31/18 12:15 AM   Result Value Ref Range    Heparin Anti-Xa 1.04 (H) 0.30 - 0.70  IU/mL   Comprehensive metabolic panel    Collection Time: 05/31/18  3:40 AM   Result Value Ref Range    Sodium 145 136 - 145 mmol/L    Potassium 3.4 (L) 3.5 - 5.1 mmol/L    Chloride 100 95 - 110 mmol/L    CO2 36 (H) 23 - 29 mmol/L    Glucose 153 (H) 70 - 110 mg/dL    BUN, Bld 20 6 - 20 mg/dL    Creatinine 0.6 0.5 - 1.4 mg/dL    Calcium 7.7 (L) 8.7 - 10.5 mg/dL    Total Protein 4.6 (L) 6.0 - 8.4 g/dL    Albumin 2.2 (L) 3.5 - 5.2 g/dL    Total Bilirubin 0.6 0.1 - 1.0 mg/dL    Alkaline Phosphatase 241 (H) 55 - 135 U/L    AST 55 (H) 10 - 40 U/L     (H) 10 - 44 U/L    Anion Gap 9 8 - 16 mmol/L    eGFR if African American >60.0 >60 mL/min/1.73 m^2    eGFR if non African American >60.0 >60 mL/min/1.73 m^2   CBC auto differential    Collection Time: 05/31/18  3:40 AM   Result Value Ref Range    WBC 6.93 3.90 - 12.70 K/uL    RBC 3.03 (L) 4.00 - 5.40 M/uL    Hemoglobin 8.9 (L) 12.0 - 16.0 g/dL    Hematocrit 27.3 (L) 37.0 - 48.5 %    MCV 90 82 - 98 fL    MCH 29.4 27.0 - 31.0 pg    MCHC 32.6 32.0 - 36.0 g/dL    RDW 19.4 (H) 11.5 - 14.5 %    Platelets 126 (L) 150 - 350 K/uL    MPV 11.8 9.2 - 12.9 fL    Immature Granulocytes 1.0 (H) 0.0 - 0.5 %    Gran # (ANC) 6.7 1.8 - 7.7 K/uL    Immature Grans (Abs) 0.07 (H) 0.00 - 0.04 K/uL    Lymph # 0.0 (L) 1.0 - 4.8 K/uL    Mono # 0.1 (L) 0.3 - 1.0 K/uL    Eos # 0.0 0.0 - 0.5 K/uL    Baso # 0.01 0.00 - 0.20 K/uL    nRBC 5 (A) 0 /100 WBC    Gran% 97.0 (H) 38.0 - 73.0 %    Lymph% 0.3 (L) 18.0 - 48.0 %    Mono% 1.6 (L) 4.0 - 15.0 %    Eosinophil% 0.0 0.0 - 8.0 %    Basophil% 0.1 0.0 - 1.9 %    Aniso Slight     Poik Slight     Poly Occasional     Hypo Occasional     Ovalocytes Occasional     Toxic Granulation Present     Differential Method Automated    Magnesium    Collection Time: 05/31/18  3:40 AM   Result Value Ref Range    Magnesium 2.0 1.6 - 2.6 mg/dL   Anti-Xa Heparin Monitoring    Collection Time: 05/31/18  7:29 AM   Result Value Ref Range    Heparin Anti-Xa 0.97 (H) 0.30 - 0.70  "IU/mL   ISTAT PROCEDURE    Collection Time: 05/31/18  7:33 AM   Result Value Ref Range    POC PH 7.525 (H) 7.35 - 7.45    POC PCO2 48.1 (H) 35 - 45 mmHg    POC PO2 25 (LL) 40 - 60 mmHg    POC HCO3 39.7 (H) 24 - 28 mmol/L    POC BE 17 -2 to 2 mmol/L    POC SATURATED O2 52 (L) 95 - 100 %    POC TCO2 41 (H) 24 - 29 mmol/L    Sample VENOUS     Site Other     Allens Test N/A     DelSys Nasal Can     Mode SPONT     Flow 35     FiO2 50           Medical ROS  General ROS: negative for - chills, fatigue or fever  Respiratory ROS: shortness of breath  Cardiovascular ROS: no chest pain or dyspnea on exertion  Gastrointestinal ROS: no abdominal pain, change in bowel habits, or black or bloody stools  Musculoskeletal ROS: negative for - gait disturbance, joint stiffness, muscle pain or muscular weakness  Neurological ROS: negative for - confusion, dizziness, gait disturbance, headaches, impaired coordination/balance, seizures or visual changes      Mental Status Exam:  Appearance: older than stated age, sitting up in bed  Behavior/Cooperation:  normal, cooperative, eye contact minimal (pt keeps closing eyes)  Speech:  normal tone, normal pitch, normal volume, increased latency of response  Language: grossly intact, able to name, able to repeat with spontaneous speech  Mood: "tired and anxious"  Affect:  congruent with mood and appropriate to situation/content   Thought Process: normal and logical  Thought Content: normal, no suicidality, no homicidality, delusions, or paranoia  Sensorium:  Somnolent  Alert and Oriented: x2 person, place  Memory: 3/3 immediate, 0/3 at 5 minutes    Recent:  Limitedt; able to report some recent events   Remote: Limitedt; Named 2/4 past presidents   Attention/concentration: impaired; able to spell [id number] but unable to spell backwards   Similarities: Intact; (difference between apple and orange?)  Abstract reasoning: Limited  Insight:  Intact  Judgment: Appropriate to situation      RASS- -2  CAM " ICU- POSITIVE      Paula Robertson, MS3  [facility]  5/31/2018 9:12 AM

## 2018-05-31 NOTE — PROCEDURES
Bronchoscopy Procedure Note        Date of Procedure: 5/31/18     Pre-op Diagnosis: Concern for mucous plugging, endobronchial lesion, UIP     Post-op Diagnosis: No mucous plugging, no endobronchial lesion, UIP     Physicians: Deyanira Kothari MD (fellow) & JAYSON Martínez MD (staff)     Anesthesia:   Propofol gtt     Operation: Flexible fiberoptic bronchoscopy, diagnostic     Specimen: none     Estimated Blood Loss: Minimal        Consent:   The risks, benefits, complications, treatment options and expected outcomes were discussed with the patient.  The possibilities of reaction to medication, pulmonary aspiration, pneumothorax, bleeding, failure to diagnose her condition, and creating a complication requiring transfusion or operation were discussed with the patient who freely signed the consent.       Description of Procedure:  The patient was seen in ICU. The patient was identified as Kamla Coulter and the procedure verified as Flexible Fiberoptic Bronchoscopy.  A Time Out was conducted, and the above information confirmed.      The bronchoscope was passed through the ETT. The scope was then passed into the trachea. Careful inspection of the tracheal lumen was accomplished. There were no thick secretions noted. Mucosa with scattered erythema and friable mucosa. The scope was sequentially passed into the left main and then left upper and lower bronchi and segmental bronchi. The scope was then withdrawn and advanced into the right main bronchus and then into the RUL, RML, and RLL bronchi and segmental bronchi.      The scope was then withdrawn, and the patient remained in the ICU with no immediate complications.    Deyanira Kothari MD  [employer] [facility]

## 2018-05-31 NOTE — ASSESSMENT & PLAN NOTE
-- hx of DVT in left lower extremity. Completed 3 month course of Eliquis late 2017   -- CT with contrast on 5/28 showed poor lingular artery filling concerning for PE  -- Continue hep gtt  -- needs life-long AC.

## 2018-05-31 NOTE — SUBJECTIVE & OBJECTIVE
Interval History/Significant Events:     Ms. Coulter slept well overnight on CPAP. Yesterday afternoon she had a bowel movement with digital stimulation from nursing. Psych saw her for her anxiety and recommended supportive care with zyprexa PRN.     Review of Systems   Constitutional: Negative for chills, diaphoresis and fever.   HENT: Negative for rhinorrhea and sore throat.    Respiratory: Positive for shortness of breath and wheezing. Negative for cough.    Cardiovascular: Negative for chest pain and leg swelling.   Gastrointestinal: Negative for abdominal pain, diarrhea, nausea and vomiting.   Genitourinary: Negative for dysuria and hematuria.   Musculoskeletal: Negative for arthralgias and myalgias.   Skin: Negative for rash.   Neurological: Positive for weakness. Negative for headaches.   Psychiatric/Behavioral: Positive for confusion.     Objective:     Vital Signs (Most Recent):  Temp: 98.4 °F (36.9 °C) (05/31/18 0300)  Pulse: (!) 112 (05/31/18 0731)  Resp: (!) 24 (05/31/18 0731)  BP: (!) 141/67 (05/31/18 0600)  SpO2: 95 % (05/31/18 0731) Vital Signs (24h Range):  Temp:  [97.9 °F (36.6 °C)-98.7 °F (37.1 °C)] 98.4 °F (36.9 °C)  Pulse:  [100-140] 112  Resp:  [20-61] 24  SpO2:  [86 %-100 %] 95 %  BP: (119-164)/(61-95) 141/67   Weight: 81.4 kg (179 lb 7.3 oz)  Body mass index is 32.82 kg/m².      Intake/Output Summary (Last 24 hours) at 05/31/18 0736  Last data filed at 05/31/18 0632   Gross per 24 hour   Intake          1986.53 ml   Output             1190 ml   Net           796.53 ml       Physical Exam   Constitutional: No distress.   Chronically ill-appearing woman   HENT:   Head: Normocephalic and atraumatic.   Eyes: Conjunctivae and EOM are normal. Pupils are equal, round, and reactive to light.   Neck: Normal range of motion. Neck supple. No JVD present.   Cardiovascular: Regular rhythm.  Exam reveals no gallop and no friction rub.    No murmur heard.  Tachycardic  R. Brachial PICC in place  "  Pulmonary/Chest: Effort normal. No respiratory distress. She has wheezes (intermittent). She has rales (bilateral).   Abdominal: Soft. Bowel sounds are normal. She exhibits no distension and no mass. There is no tenderness.   Genitourinary:   Genitourinary Comments: Olivares in place   Musculoskeletal: Normal range of motion. She exhibits edema (1+ LE bilateral). She exhibits no tenderness.   Neurological: She is alert. No cranial nerve deficit.   Follows commands   Skin: Skin is warm and dry. No rash noted. No erythema.       Vents:  Vent Mode: Spont (05/29/18 1820)  Ventilator Initiated: Yes (05/28/18 0806)  Set Rate: 0 bmp (05/29/18 1820)  Vt Set: 400 mL (05/29/18 1820)  Pressure Support: 5 cmH20 (05/29/18 1820)  PEEP/CPAP: 5 cmH20 (05/29/18 1820)  Oxygen Concentration (%): 50 (05/31/18 0731)  Peak Airway Pressure: 11 cmH2O (05/29/18 1820)  Plateau Pressure: 0 cmH20 (05/29/18 1820)  Total Ve: 5.88 mL (05/29/18 1820)  F/VT Ratio<105 (RSBI): (!) 43.93 (05/29/18 1820)  Lines/Drains/Airways     Peripherally Inserted Central Catheter Line                 PICC Double Lumen 05/24/18 right brachial 7 days          Drain                 NG/OG Tube 05/24/18 0000 7 days         Urethral Catheter 05/24/18 16 Fr. 7 days              Significant Labs:    CBC/Anemia Profile:    Recent Labs  Lab 05/30/18  0413 05/30/18  1110 05/31/18  0340   WBC 10.28 8.36 6.93   HGB 10.3* 9.8* 8.9*   HCT 30.8* 30.4* 27.3*   * 141* 126*   MCV 87 88 90   RDW 19.3* 19.4* 19.4*        Chemistries:    Recent Labs  Lab 05/30/18  0413 05/30/18  1629 05/31/18  0340    143 145   K 2.6* 3.4* 3.4*   CL 92* 96 100   CO2 40* 39* 36*   BUN 21* 19 20   CREATININE 0.6 0.6 0.6   CALCIUM 7.9* 7.9* 7.7*   ALBUMIN 2.3*  --  2.2*   PROT 4.9*  --  4.6*   BILITOT 1.3*  --  0.6   ALKPHOS 272*  --  241*   *  --  126*   AST 56*  --  55*   MG 1.8  --  2.0       Significant Imaging:    CXR 5/31  "1. No interval detrimental change.  2. Bilateral " "dependent pleural effusions with atelectasis or airspace consolidation of the adjacent lung." - per interpreting radiologist  "

## 2018-05-31 NOTE — PLAN OF CARE
Problem: SLP Goal  Goal: SLP Goal  Speech Language Pathology Goals  Goals expected to be met by 6/6/2018  1. Pt will participate in ongoing swallow assessment      Outcome: Ongoing (interventions implemented as appropriate)  Pt remains lethargic and on HFNC. Pt not appropriate for PO intake. Recommend continue NPO with alternative means nutrition/hydration/medication and ST to continue to follow for ongoing swallow assessment. Thank you.    LEONEL Pierson., [facility]-SLP  Speech-Language Pathology  Pager: [phone]  5/31/2018

## 2018-05-31 NOTE — ASSESSMENT & PLAN NOTE
-- Stable  -- likely due to being on steroids chronically.   -- a1c 5.2, good poct glucose measurements earlier in admission  -- initiate TF / diet today with impact peptide 1.5, appreciate nutrition rec's.

## 2018-05-31 NOTE — ASSESSMENT & PLAN NOTE
-- s/p voriconazole and steroid taper treated at [facility] end of 2017. Complicated by vision loss  -- continue isavuconazonium per ID

## 2018-05-31 NOTE — ASSESSMENT & PLAN NOTE
- Worsening this morning despite improvement yesterday without medications  - Scheduled zyprexa for a day. Will administer on PRN basis going foward  - Appreciate psych recommendations

## 2018-05-31 NOTE — ASSESSMENT & PLAN NOTE
Though patient oriented currently displays impairment in concentration and attention, recent nursing note states patient has been making illogical statements. CAM ICU positive. Likely multifactorial given MRSA bacteremia, MRSA bacteriuria, MRSA/Achromobacter pneumonia, acute respiratory failure in setting of asthma/ABPA exacerbation. Delirium likely to worsen given poor respiratory effort and oxygenation post extubation.   - Recommend Zyprexa 2.5 mg PO via NGT (per pharmacy ok to crush) qHS scheduled, also recommend Zyprexa 2.5 mg PO/IM q8 PRN nonredirectable agitation. QTc 462 on 5/30. Ammonia level ordered given elevated LFTs.  - Decreased Lexpro to 5 mg given continued decreased platelets  - Recommend discontinue Famotidine as this can contribute to delirium  · DELIRIUM BEHAVIOR MANAGEMENT  · PLEASE utilize CHEMICAL restraints with PRN meds first for agitation. Minimize use of PHYSICAL restraints  · Keep window shades open and room lit during day and room dim at night in order to promote normal sleep-wake cycles  · Encourage family at bedside. [place] patient often to situation, location, date.  · Continue to Limit or Discontinue use of Narcotics, Benzos and Anti-cholinergic medications as they may worsen delirium.  · Continue medical workup for causative etiology of Delirium.

## 2018-05-31 NOTE — ASSESSMENT & PLAN NOTE
-- Improving  -- Likely multifactorial with respiratory secretion, asthma exacerbation, and volume overload all likely to be contributing  -- new PE findings on CT chest with contrast performed on 5/28/2018 with concerning questionable lingular branch PE. Heparin held in event of acute drop in H/H on 5/29/2018 and heparin resumed am of 5/30/2018.   -- continue broad spectrum abx with vancomycin and meropenem covering for MRSA and Achromobacter xylogens per ID for +ve cultures from bronchial wash on EBUS and bronch on 5/22 and 5/24, respectively.   -- Continue Solumedrol 80mg bid.  -- duonebs [id number] + 3% NS + cough assist device + use Aerobika with duonebs when appropriate.  -- intubated on 5/28/2018 and bronchoscopy revealing mucus plugging of left main bronchial system. extubated on 5/29/2018 following repeat bronchoscopy.   -- CT chest with contrast on 5/28/2018 revealed no drainable collection at site of former abscess  -- holding lasix, +781 mL total yesterday. Will match I/O  -- Extubated without complication. CPAP overnight

## 2018-05-31 NOTE — ASSESSMENT & PLAN NOTE
- Some confusion noted this morning, improving now. Numerous possible etiologies  - Scheduling zyprexa  - see ELAINA above

## 2018-06-01 NOTE — PROGRESS NOTES
Notified  that Oral medications unable to be given due to patient not having NGT in place and awaiting swallow evaluation from speech therapy. Noted that patient refusing NG Tube placement.

## 2018-06-01 NOTE — ASSESSMENT & PLAN NOTE
-- Stable  -- On Xolair and prednisone taper at home  -- continue solumedrol 80 mg IV BID  -- continue home spiriva, breo, and singulair.  -- rest as described in hypoxic resp failure.

## 2018-06-01 NOTE — SUBJECTIVE & OBJECTIVE
"Interval History: Patient states she is doing well. BiPAP is helping with breathing. Slept well through night. Denies feelings of confusion, paranoia, AVH. Denies depressed mood, anxiety, hopelessness, SI/HI.  present with her permission.     feels she is doing well today and had a good night. Pulled out own NG tube, but felt this was done accidentally while she was sleeping. Does not think she has been confused.    Family History     Problem Relation (Age of Onset)    Atrial fibrillation Mother    Diabetes Mother    Heart disease Father    Hyperlipidemia Mother, Father    Hypertension Mother, Father, Sister        Social History Main Topics    Smoking status: Never Smoker    Smokeless tobacco: Never Used    Alcohol use No      Comment: social occ    Drug use: No    Sexual activity: Yes     Partners: Male     Psychotherapeutics     Start     Stop Route Frequency Ordered    06/01/18 2100  OLANZapine tablet 2.5 mg      -- PER NG TUBE Nightly 06/01/18 0847    06/01/18 0900  escitalopram oxalate tablet 5 mg      -- PER NG TUBE Daily 05/31/18 1023    05/31/18 1208  OLANZapine tablet 2.5 mg      -- PER NG TUBE Every 8 hours PRN 05/31/18 1117    05/25/18 2249  ramelteon tablet 8 mg      -- Oral Nightly PRN 05/25/18 2150           Review of Systems  Objective:     Vital Signs (Most Recent):  Temp: 98 °F (36.7 °C) (06/01/18 0700)  Pulse: 107 (06/01/18 1020)  Resp: (!) 24 (06/01/18 1020)  BP: (!) 141/85 (06/01/18 1000)  SpO2: 96 % (06/01/18 1020) Vital Signs (24h Range):  Temp:  [97.7 °F (36.5 °C)-98.8 °F (37.1 °C)] 98 °F (36.7 °C)  Pulse:  [100-129] 107  Resp:  [14-55] 24  SpO2:  [90 %-96 %] 96 %  BP: (113-174)/() 141/85     Height: 5' 2" (157.5 cm)  Weight: 81.4 kg (179 lb 7.3 oz)  Body mass index is 32.82 kg/m².      Intake/Output Summary (Last 24 hours) at 06/01/18 1022  Last data filed at 06/01/18 1000   Gross per 24 hour   Intake          1991.28 ml   Output              781 ml   Net          " 1210.28 ml       Physical Exam   Psychiatric:   Appearance: older than stated age, sitting up in bed, in mild distress, BiPAP mask in place  Behavior/Cooperation:  cooperative, good eye contact  Speech: soft but spontaneous  Language: grossly intact, able to name, able to repeat with spontaneous speech  Mood: motions with a thumbs up  Affect:  congruent with stated mood  Thought Process: logical  Thought Content: normal, no suicidality, no homicidality, delusions, or paranoia  Sensorium:  awake, alert  Alert and Oriented: oriented to person, place, time, and situation  Memory: recent and remote grossly intact  Attention/concentration: completed SAVEAHAART successfully  Insight:  fair  Judgment: Appropriate to situation        Significant Labs: All pertinent labs within the past 24 hours have been reviewed.    Significant Imaging: None

## 2018-06-01 NOTE — PLAN OF CARE
Problem: Patient Care Overview  Goal: Plan of Care Review  Outcome: Ongoing (interventions implemented as appropriate)  No acute events overnight. Comfort flow settings remain unchanged. Pt.removed NG during shift. Per MD Wade placement to be held pending SLP follow up during day shift. x1 loose BM overnight. UO minimal. POC reviewed and discussed with pt. And spouse. All questions and concerns were addressed. Spouse and pt verbalized understanding.

## 2018-06-01 NOTE — ASSESSMENT & PLAN NOTE
CAM-ICU now negative, seems to be improving cognitively. Delirium may have been multifactorial given MRSA bacteremia, MRSA bacteriuria, MRSA/Achromobacter pneumonia, acute respiratory failure in setting of asthma/ABPA exacerbation - initiation of BiPAP seems most likely explanation for improvement this morning  - scheduled antipsychotics do not seem necessary at this point, olanzapine 2.5 qhs can be discontinued. Would just leave PRN dose of olanzapine available in case agitation does occur  -  QTc 462 on 5/30. Ammonia level ordered given elevated LFTs - normal at 45  - Decreased Lexpro to 5 mg given continued decreased platelets  - Recommend discontinue Famotidine as this can contribute to delirium  · DELIRIUM BEHAVIOR MANAGEMENT  · PLEASE utilize CHEMICAL restraints with PRN meds first for agitation. Minimize use of PHYSICAL restraints  · Keep window shades open and room lit during day and room dim at night in order to promote normal sleep-wake cycles  · Encourage family at bedside. [place] patient often to situation, location, date.  · Continue to Limit or Discontinue use of Narcotics, Benzos and Anti-cholinergic medications as they may worsen delirium.  · Continue medical workup for causative etiology of Delirium.

## 2018-06-01 NOTE — PROGRESS NOTES
2nd Charge Nurse called to patient room regarding swallow evaluation. Patient and  demanding explanation. Explanation given secondary to speech therapist note on 6/1/18. Patient and  demanding an explanation. second attempt to place NG Tube for medication administration and Patient refused.    16:30pm Dr. Pressley notified and asked to call speech office to locate speech therapist on call.     16:45pm Dr. Pressley notified that speech therapy has not returned page and office closed. Dr. Pressley requested to re-attempt NG Tube placement.    1700pm Patient refused NG tube placement.

## 2018-06-01 NOTE — SUBJECTIVE & OBJECTIVE
Interval History/Significant Events:   NAEON. Removed NG overnight.     Review of Systems   Constitutional: no fever or chills  ENT: no nasal congestion or sore throat  Respiratory: no cough + shortness of breath  Cardiovascular: no chest pain or palpitations  Gastrointestinal: no nausea or vomiting  Genitourinary: no hematuria or dysuria  Integument/Breast: no rash or pruritis  Hematologic/Lymphatic: no easy bruising or lymphadenopathy  Musculoskeletal: no arthralgias or myalgias  Neurological: no seizures or tremors  Endocrine: no heat or cold intolerance      Objective:     Vital Signs (Most Recent):  Temp: 97.8 °F (36.6 °C) (06/01/18 0300)  Pulse: 104 (06/01/18 0600)  Resp: (!) 27 (06/01/18 0600)  BP: 124/84 (06/01/18 0600)  SpO2: (!) 94 % (06/01/18 0600) Vital Signs (24h Range):  Temp:  [97.7 °F (36.5 °C)-98.8 °F (37.1 °C)] 97.8 °F (36.6 °C)  Pulse:  [100-130] 104  Resp:  [20-59] 27  SpO2:  [90 %-96 %] 94 %  BP: (113-174)/() 124/84   Weight: 81.4 kg (179 lb 7.3 oz)  Body mass index is 32.82 kg/m².      Intake/Output Summary (Last 24 hours) at 06/01/18 0721  Last data filed at 06/01/18 0600   Gross per 24 hour   Intake          1892.54 ml   Output              802 ml   Net          1090.54 ml       Physical Exam    General: obese lady with Cushingoid facies, tachypnic. On CFNC.   Eyes: chemosis improved with lateral conjunctival edema b/l, PERRL. EOMI.   Neck: supple, symmetrical, trachea midline, no JVD.  HENT: AT NC  Cardiovascular: Heart: tachycardic, regular rhythm, S1, S2 normal, no murmur, click, rub or gallop.  Lungs: slight ronchi heard dispersedly,   Chest Wall: no tenderness.  Abdomen/Rectal: Abdomen: Non distended. No BS. No masses. No TTP.   Extremities: 1+ pitting edema up to the ankles, no redness or tenderness in the calves or thighs. Pulses: 2+ and symmetric  Skin: Skin color, texture, turgor normal. No rashes or lesions  Musculoskeletal: full range of motion of joints.   Lymph Nodes: No  cervical or supraclavicular adenopathy  Psych/Behavioral: Normal. Alert and oriented.      Lines/Drains/Airways     Peripherally Inserted Central Catheter Line                 PICC Double Lumen 05/24/18 right brachial 8 days          Drain                 Urethral Catheter 05/24/18 16 Fr. 8 days              Significant Labs:    CBC/Anemia Profile:    Recent Labs  Lab 05/30/18  1110 05/31/18  0340 05/31/18  1735   WBC 8.36 6.93 7.35   HGB 9.8* 8.9* 8.9*   HCT 30.4* 27.3* 27.7*   * 126* 123*   MCV 88 90 91   RDW 19.4* 19.4* 19.0*        Chemistries:    Recent Labs  Lab 05/31/18  0340 05/31/18  1253 06/01/18  0228    144 145   K 3.4* 4.0 3.7    100 102   CO2 36* 35* 35*   BUN 20 23* 24*   CREATININE 0.6 0.6 0.6   CALCIUM 7.7* 8.0* 7.7*   ALBUMIN 2.2*  --  2.1*   PROT 4.6*  --  4.4*   BILITOT 0.6  --  0.5   ALKPHOS 241*  --  217*   *  --  107*   AST 55*  --  48*   MG 2.0  --  2.2   PHOS  --   --  1.1*         DVT upper ext negative for DVT.

## 2018-06-01 NOTE — ASSESSMENT & PLAN NOTE
-- hx of DVT in left lower extremity. Completed 3 month course of Eliquis late 2017   -- CT with contrast on 5/28 showed poor lingular artery filling concerning for PE  -- Continue hep gtt.   -- needs life-long AC.

## 2018-06-01 NOTE — ASSESSMENT & PLAN NOTE
-- slowly down trending plt pending this am.   -- sending for HIT but proceeding with resuming the heparin gtt  -- 4T score of 4, intermediate. HIT Ab sent, will follow up on result.

## 2018-06-01 NOTE — ASSESSMENT & PLAN NOTE
--  Improving today 35   -- likely due to contraction alkalosis and upper GI losses with concomitant hypochloremia.

## 2018-06-01 NOTE — PROGRESS NOTES
[facility]  Critical Care Medicine  Progress Note    Patient Name: Kamla Coulter  MRN: [id number]  Admission Date: 5/25/2018  Hospital Length of Stay: 7 days  Code Status: Full Code  Attending Provider: Mario Stallings MD  Primary Care Provider: Molina Alexandre MD   Principal Problem: Acute hypoxemic respiratory failure    Subjective:     HPI:    This is a 57 year old female with hx of severe persistent asthma on Xolair, prolonged immunosuppression on steroids, ABPA,bronchiectasis, HTN, DVT, and GERD who is transferred from [facility] for higher level of care. She initially presented to the OSH on May second for progressive shortness of breath, wheezing and orthopnea over a period of 2-3 days associated with low-grade fever. She was admitted to the hospital medicine floor and started on IV solumedrol 40mg bid and was placed on BiPAP. She was weaned off to NC on the second day of admission. Blood cultures from admission grew MRSA bacteremia and patient was started on vancomycin. Urine and sputum cultures both grew MRSA. The patient had persistent MRSA bacteremia despite being on vancomycin and a SHEILA was done on 5/8/2018 that was negative for endocarditis and revealed normal EF%. A bone scan on 5/10/2018 was similarly negative for an infection nidus. On 5/10/2018 the patient developed an acute hypoxic respiratory failure that did not improve promptly on BiPAP with an FiO2 of 100% and the patient was moved to the ICU and started empirically on therapeutic Lovenox. A CTA done on the same day revealed no PE but was notable for worsening bilateral pleural effusion and a non-specific infiltrate/collection around the mid-esophagus. The collection was thought to be a loculated pleural effusion. IV solumedrol was increased to 80mg twice daily and Lovenox was decreased to ppx dosing. By 5/12/2018 the patient's respiratory failure had improved and patient was weaned off of BiPAP to  nasal cannula. Sputum cultures collected on 5/13/2018 showed budding yeast and the patient was started on voriconazole. On 5/15/2018 the patient had worsening hypoxia and increased work of breathing and a CXR showed HAP and Ceftaroline was added to vancomycin. On 5/16/2018, repeat blood cultures showed persistent MRSA bacteremia and an Indium scan performed on 5/18/2018 revealed significant uptake by a collection located adjacent to the mid-esophagus. The patient was electively intubated on 5/22/2018 and an EGD revealed a whitish plaque at the proximal esophagus that was biopsied and resulted negative for malignant changes. Similarly, on the same day, an EBUS was performed and showed fluid collection posterior to the distal trachea that was better assessed through 5 FNA passes. Cultures from the EBUS grew MRSA + Alcaligenes/Achromobcater Xylosoxidens. However, FNA was negative for any malignancies. The patient was extubated the same day. Two days later (5/24/2018), the patient developed an acute decompensated respiratory failure with oxygen saturations dropping to the 60's and the patient was emergently intubated. CXR showed left lower lobe atelectasis. An emergent bronchoscopy revealed a mucus plug in the left mainstem bronchus. The mucus plug was brown, thick, and difficult to suction raising suspicion for Aspergillus infection. Following mucus plug disimpaction, the patient's respiratory status recovered immediately and the patient was extubated the next morning (5/25/2018). Prior to that a repeat CT chest showed persistent collection around the mid-esophagus. At this point, it was decided that the patient was better served at a higher level of care facility and the patient was transferred to [facility]. The patient arrived on BiPAP 10/5 and a 45% FiO2 with good oxygen saturation.       Hospital/ICU Course:  Admitted as a transfer from [facility] on 5/25/2018 for higher level of care. Patient required BiPAP on  admission but was able to wean to HFNC on second day of hospitalization. ID was consulted and had made changes to antimicrobials with continuing vancomycin, switching voriconazole to isavuconazonium and zosyn to meropenem. Blood cultures showed NGTD.  05/28/2018 Worsening respiratory status requiring intubation morning of 5/28/2018 for oxygen saturation in the lower 70's with emergent bronchoscopy done following intubation revealing left system mucus plugging. Following suctioning of mucus plugging oxygen saturation had finally improved. Repeat CT chest did not show mediastinal collection.    05/29/2018 drop in H/H requiring two units of pRBC. Heparin held. Plan for extubation today. Continuing diuresis and management of resp secretions. CT abdomen revealed no intraabdominal source of bleeding. Patient had repeat bronchoscopy and extubated to [facility].    05/30/2018 resuming heparin gtt and weaning oxygen requirement prn. Delirious overnight.       06/01/2018 NG removed by patient overnight. Patient with SLP repeat eval this am. Continue weaning off oxygen.       Interval History/Significant Events:   NAEON. Removed NG overnight.     Review of Systems   Constitutional: no fever or chills  ENT: no nasal congestion or sore throat  Respiratory: no cough + shortness of breath  Cardiovascular: no chest pain or palpitations  Gastrointestinal: no nausea or vomiting  Genitourinary: no hematuria or dysuria  Integument/Breast: no rash or pruritis  Hematologic/Lymphatic: no easy bruising or lymphadenopathy  Musculoskeletal: no arthralgias or myalgias  Neurological: no seizures or tremors  Endocrine: no heat or cold intolerance      Objective:     Vital Signs (Most Recent):  Temp: 97.8 °F (36.6 °C) (06/01/18 0300)  Pulse: 104 (06/01/18 0600)  Resp: (!) 27 (06/01/18 0600)  BP: 124/84 (06/01/18 0600)  SpO2: (!) 94 % (06/01/18 0600) Vital Signs (24h Range):  Temp:  [97.7 °F (36.5 °C)-98.8 °F (37.1 °C)] 97.8 °F (36.6 °C)  Pulse:   [100-130] 104  Resp:  [20-59] 27  SpO2:  [90 %-96 %] 94 %  BP: (113-174)/() 124/84   Weight: 81.4 kg (179 lb 7.3 oz)  Body mass index is 32.82 kg/m².      Intake/Output Summary (Last 24 hours) at 06/01/18 0721  Last data filed at 06/01/18 0600   Gross per 24 hour   Intake          1892.54 ml   Output              802 ml   Net          1090.54 ml       Physical Exam    General: obese lady with Cushingoid facies, tachypnic. On CFNC.   Eyes: chemosis improved with lateral conjunctival edema b/l, PERRL. EOMI.   Neck: supple, symmetrical, trachea midline, no JVD.  HENT: AT NC  Cardiovascular: Heart: tachycardic, regular rhythm, S1, S2 normal, no murmur, click, rub or gallop.  Lungs: slight ronchi heard dispersedly,   Chest Wall: no tenderness.  Abdomen/Rectal: Abdomen: Non distended. No BS. No masses. No TTP.   Extremities: 1+ pitting edema up to the ankles, no redness or tenderness in the calves or thighs. Pulses: 2+ and symmetric  Skin: Skin color, texture, turgor normal. No rashes or lesions  Musculoskeletal: full range of motion of joints.   Lymph Nodes: No cervical or supraclavicular adenopathy  Psych/Behavioral: Normal. Alert and oriented.      Lines/Drains/Airways     Peripherally Inserted Central Catheter Line                 PICC Double Lumen 05/24/18 right brachial 8 days          Drain                 Urethral Catheter 05/24/18 16 Fr. 8 days              Significant Labs:    CBC/Anemia Profile:    Recent Labs  Lab 05/30/18  1110 05/31/18  0340 05/31/18  1735   WBC 8.36 6.93 7.35   HGB 9.8* 8.9* 8.9*   HCT 30.4* 27.3* 27.7*   * 126* 123*   MCV 88 90 91   RDW 19.4* 19.4* 19.0*        Chemistries:    Recent Labs  Lab 05/31/18  0340 05/31/18  1253 06/01/18  0228    144 145   K 3.4* 4.0 3.7    100 102   CO2 36* 35* 35*   BUN 20 23* 24*   CREATININE 0.6 0.6 0.6   CALCIUM 7.7* 8.0* 7.7*   ALBUMIN 2.2*  --  2.1*   PROT 4.6*  --  4.4*   BILITOT 0.6  --  0.5   ALKPHOS 241*  --  217*   *   --  107*   AST 55*  --  48*   MG 2.0  --  2.2   PHOS  --   --  1.1*         DVT upper ext negative for DVT.    Assessment/Plan:     Neuro   Delirium    - likely due to prolonged ICU stay  - resolving  - Scheduling zyprexa  - see ELAINA above        Psychiatric   Generalized anxiety disorder    - zyprexa PRN   - Appreciate psych recommendations        Ophtho   Conjunctival edema of both eyes    -- likely due to voriconazole currently improving after switching to isavuconazonium.           Pulmonary   * Acute hypoxemic respiratory failure    -- Improving  -- Likely multifactorial with respiratory secretion, asthma exacerbation, and volume overload all likely to be contributing  -- new PE findings on CT chest with contrast performed on 5/28/2018 with concerning questionable lingular branch PE. Heparin held in event of acute drop in H/H on 5/29/2018 and resumed am of 5/30/2018.   -- continue broad spectrum abx with vancomycin and meropenem covering for MRSA and Achromobacter xylogens per ID for +ve cultures from bronchial wash on EBUS and bronch on 5/22 and 5/24, respectively.   -- Continue Solumedrol 80mg bid.  -- duonebs [id number] + 3% NS + cough assist device + use Aerobika with duonebs when appropriate.  -- intubated on 5/28/2018 and bronchoscopy revealing mucus plugging of left main bronchial system. extubated on 5/29/2018 following repeat bronchoscopy.   -- CT chest with contrast on 5/28/2018 revealed no drainable collection at site of former abscess  -- holding lasix, +781 mL total yesterday. Will match I/O.  -- Extubated without complication. CPAP overnight.  -- out of bed with PT today.        Atelectasis    -- stable  -- continue home vest during the day        Achromobacter pneumonia    -- continuing total of 7 days of Meropenem per ID today 7/7.         Severe persistent asthma    -- Stable  -- On Xolair and prednisone taper at home  -- continue solumedrol 80 mg IV BID  -- continue home spiriva, breo, and  singulair.  -- rest as described in hypoxic resp failure.        Cardiac/Vascular   Mixed hyperlipidemia    -- Continue home pravastatin        Essential hypertension    -- Stable  -- Continue verapamil at 80mg Q8H.        Renal/   Metabolic alkalosis    --  Improving today 35   -- likely due to contraction alkalosis and upper GI losses with concomitant hypochloremia.          ID   ABPA (allergic bronchopulmonary aspergillosis)    -- s/p voriconazole and steroid taper treated at [facility] end of 2017. Complicated by vision loss  -- continue isavuconazonium per ID. Continue steroids.         MRSA bacteremia     -- MRSA grew initially in urine, then blood, and finally in mediastinal collection.   -- SHEILA performed 5/8/2018 was negative for endocarditis.  -- hx of prolonged immunosuppression on Prednisone and Xolair predisposing patient to fungal infection.   -- continue IV Vancomycin for total of 6 weeks per ID beginning 5/25/2018 and follow up in ID clinic with weekly labs.   -- bronchial washing on 5/22/2018 grew MRSA.           Mediastinal collection    Bilateral pleural effusion     -- ill-defined mediastinal collection with mediastinal abscess, loculated pleural effusion, and complex pleural effusion are among the possible differentials.  -- GS and culture during EBUS from bronchial washing at OSH grew MRSA + Achromobacter.   -- had 3-4 FNA passes on EBUS that showed no malignancy. Apparently, no samples were obtained from mediastinal abscess during EBUS at OSH on 5/22/2018.  -- continue broad spectrum abx including vancomycin and Meropenem treating for MRSA bacteremia and achromobacter from BAL on 5/22 and 5/24.    -- repeat CT chest with contrast on 5/28/2018 here revealed no persistent mediastinal collection amenable to drainage.   -- abscess likely has improved following appropriate abx treatment. No need for surgical/IR intervention at this time.              Hematology   Thrombocytopenia    -- slowly  down trending plt pending this am.   -- sending for HIT but proceeding with resuming the heparin gtt  -- 4T score of 4, intermediate. HIT Ab sent, will follow up on result.        History of DVT of lower extremity    -- hx of DVT in left lower extremity. Completed 3 month course of Eliquis late 2017   -- CT with contrast on 5/28 showed poor lingular artery filling concerning for PE  -- Continue hep gtt.   -- needs life-long AC.         Oncology   Hereditary spherocytosis    -- Stable  -- CBC pending  -- Haptoglobin > 250 at time of anemia re-assuring.        Endocrine   Impaired glucose tolerance    -- Stable  -- likely due to being on steroids chronically.   -- a1c 5.2, good poct glucose measurements earlier in admission  -- initiate TF / diet today with impact peptide 1.5, appreciate nutrition rec's.    Dysphagia  -- failed swallow yesterday repeating today. Appreciate SLP assistance. Keep NPO for now.       GI   Abdominal wall fluid collections    -- likely dependent edema > abdo wall hematoma.  -- continue to observe        Abnormal LFTs    -- likely due to drug adverse effect suspected from voriconazole. Switched to isavuconazonium in setting of conjunctival swelling now with improving LFT's.   -- LFT's improving.        GERD (gastroesophageal reflux disease)    -- Stable  -- Continue famotidine             Critical Care Daily Checklist:    A: Awake: RASS Goal/Actual Goal: RASS Goal: 0-->alert and calm  Actual: Ricks Agitation Sedation Scale (RASS): Alert and calm   B: Spontaneous Breathing Trial Performed?     C: SAT & SBT Coordinated?  na                      D: Delirium: CAM-ICU Overall CAM-ICU: Negative   E: Early Mobility Performed? yes   F: Feeding Goal: Goals: Meet % EEN, EPN  Status: Nutrition Goal Status: new   Current Diet Order   Procedures    Diet NPO      AS: Analgesia/Sedation prn   T: Thromboembolic Prophylaxis Heparin gtt   H: HOB > 300 yes   U: Stress Ulcer Prophylaxis (if needed)  pepcid    G: Glucose Control prn   B: Bowel Function Stool Occurrence: 1   I: Indwelling Catheter (Lines & Olivares) Necessity yes   D: De-escalation of Antimicrobials/Pharmacotherapies yes    Plan for the day/ETD Supportive care    Code Status:  Family/Goals of Care: Full Code           Hien Pressley MD  Critical Care Medicine  [facility]

## 2018-06-01 NOTE — MEDICAL/APP STUDENT
"6/1/2018 9:22 AM   Kamla Ventura Yfn   1960   [id number]        PSYCHIATRY CONSULT PROGRESS NOTE       BRIEF HPI   Patient is a 57 y.o. female with a PPH of anxiety and a PMH of persistent asthma, prolonged immunosuppression, ABPA, bronchiectasis, HTN, DVT, and GERD. Pt is admitted for acute respiratory failure. Psychiatry was originally consulted for anxiety.    Pt was increasingly delirious yesterday and was prescribed Zyprexa 2.5 mg. Pt has not require Zyprexa during the day and did not receive scheduled Zyprexa last night. I was unable to interview/examine the pt at this time since pt was undergoing vest therapy and had on CPAP.     Per , pt is still confused and slightly agitated.  reports she "flipped him off" for stepping out of the room earlier which he says is very unusual of her. Pt's  reports that pt has been more alert compared to yesterday and their children visited her last night. He has not yet tried music therapy but plans on playing her music later today.         ASSESSMENT   Delirium  Generalized Anxiety Disorder      RECOMMENDATIONS      · PSYCH Meds-   Continue Lexapro 5mg daily, Zyprexa 2.5mg qHS, and Ramelteon 8mg PRN    · Legal status-  · The above will be discussed with staff psychiatrist and update any changes after rounds in the afternoon.  · Thank you for this consult will continue to follow      ----------------------------------------------------------------------------------------------------------------------  SUBJECTIVE:     Unable to interview patient.         Current Medications:   Scheduled Meds:    albuterol-ipratropium  3 mL Nebulization Q4H    baclofen  5 mg Oral TID    dapsone  100 mg Oral Daily    diclofenac sodium   Topical (Top) BID    escitalopram oxalate  5 mg Per NG tube Daily    famotidine  20 mg Oral BID    guaifenesin 100 mg/5 ml  400 mg Per NG tube Q4H    custom IVPB builder  372 mg Intravenous Q24H    meropenem (MERREM) IVPB  2 g " "Intravenous Q8H    methylPREDNISolone sodium succinate  80 mg Intravenous Q12H    montelukast  10 mg Oral Daily    OLANZapine  2.5 mg Per NG tube QHS    polyethylene glycol  17 g Per G Tube QID    potassium phosphate IVPB  30 mmol Intravenous Once    pravastatin  80 mg Oral Daily    sodium chloride 3%  4 mL Nebulization BID    vancomycin (VANCOCIN) IVPB  1,500 mg Intravenous Q24H      PRN Meds: acetaminophen, artificial tears, heparin (PORCINE), heparin (PORCINE), magnesium sulfate IVPB, magnesium sulfate IVPB, OLANZapine, ondansetron, potassium chloride in water **AND** potassium chloride in water **AND** potassium chloride in water, promethazine, ramelteon, sodium chloride 0.9%, sodium chloride 3%, sodium phosphate IVPB, sodium phosphate IVPB, sodium phosphate IVPB   Psychotherapeutics     Start     Stop Route Frequency Ordered    06/01/18 2100  OLANZapine tablet 2.5 mg      -- PER NG TUBE Nightly 06/01/18 0847    06/01/18 0900  escitalopram oxalate tablet 5 mg      -- PER NG TUBE Daily 05/31/18 1023    05/31/18 1208  OLANZapine tablet 2.5 mg      -- PER NG TUBE Every 8 hours PRN 05/31/18 1117    05/25/18 2249  ramelteon tablet 8 mg      -- Oral Nightly PRN 05/25/18 2150          Allergies:   Review of patient's allergies indicates:   Allergen Reactions    Neosporin (neomycin-polymyx) Rash     "I get a skin rash"    Sulfa (sulfonamide antibiotics) Anaphylaxis    Bactrim [sulfamethoxazole-trimethoprim]     Venlafaxine analogues     Pollen extracts Other (See Comments)     "allergic rhinitis mess"    Vfend [voriconazole] Other (See Comments)     Vision loss        Past Medical History:   Diagnosis Date    ABPA (allergic bronchopulmonary aspergillosis)     Allergy     Anxiety     Arachnoid cyst     monitoring    Asthma     COPD (chronic obstructive pulmonary disease)     Hemoptysis     Hypertension     Tachycardia     Thalamic disease     thalamia minor       OBJECTIVE:   Vitals   Vitals:    " 06/01/18 0824   BP:    Pulse: 106   Resp: (!) 23   Temp:         Labs/Imaging/Studies:   Recent Results (from the past 36 hour(s))   Anti-Xa Heparin Monitoring    Collection Time: 05/31/18 12:15 AM   Result Value Ref Range    Heparin Anti-Xa 1.04 (H) 0.30 - 0.70 IU/mL   Comprehensive metabolic panel    Collection Time: 05/31/18  3:40 AM   Result Value Ref Range    Sodium 145 136 - 145 mmol/L    Potassium 3.4 (L) 3.5 - 5.1 mmol/L    Chloride 100 95 - 110 mmol/L    CO2 36 (H) 23 - 29 mmol/L    Glucose 153 (H) 70 - 110 mg/dL    BUN, Bld 20 6 - 20 mg/dL    Creatinine 0.6 0.5 - 1.4 mg/dL    Calcium 7.7 (L) 8.7 - 10.5 mg/dL    Total Protein 4.6 (L) 6.0 - 8.4 g/dL    Albumin 2.2 (L) 3.5 - 5.2 g/dL    Total Bilirubin 0.6 0.1 - 1.0 mg/dL    Alkaline Phosphatase 241 (H) 55 - 135 U/L    AST 55 (H) 10 - 40 U/L     (H) 10 - 44 U/L    Anion Gap 9 8 - 16 mmol/L    eGFR if African American >60.0 >60 mL/min/1.73 m^2    eGFR if non African American >60.0 >60 mL/min/1.73 m^2   CBC auto differential    Collection Time: 05/31/18  3:40 AM   Result Value Ref Range    WBC 6.93 3.90 - 12.70 K/uL    RBC 3.03 (L) 4.00 - 5.40 M/uL    Hemoglobin 8.9 (L) 12.0 - 16.0 g/dL    Hematocrit 27.3 (L) 37.0 - 48.5 %    MCV 90 82 - 98 fL    MCH 29.4 27.0 - 31.0 pg    MCHC 32.6 32.0 - 36.0 g/dL    RDW 19.4 (H) 11.5 - 14.5 %    Platelets 126 (L) 150 - 350 K/uL    MPV 11.8 9.2 - 12.9 fL    Immature Granulocytes 1.0 (H) 0.0 - 0.5 %    Gran # (ANC) 6.7 1.8 - 7.7 K/uL    Immature Grans (Abs) 0.07 (H) 0.00 - 0.04 K/uL    Lymph # 0.0 (L) 1.0 - 4.8 K/uL    Mono # 0.1 (L) 0.3 - 1.0 K/uL    Eos # 0.0 0.0 - 0.5 K/uL    Baso # 0.01 0.00 - 0.20 K/uL    nRBC 5 (A) 0 /100 WBC    Gran% 97.0 (H) 38.0 - 73.0 %    Lymph% 0.3 (L) 18.0 - 48.0 %    Mono% 1.6 (L) 4.0 - 15.0 %    Eosinophil% 0.0 0.0 - 8.0 %    Basophil% 0.1 0.0 - 1.9 %    Aniso Slight     Poik Slight     Poly Occasional     Hypo Occasional     Ovalocytes Occasional     Toxic Granulation Present      Differential Method Automated    Magnesium    Collection Time: 05/31/18  3:40 AM   Result Value Ref Range    Magnesium 2.0 1.6 - 2.6 mg/dL   Anti-Xa Heparin Monitoring    Collection Time: 05/31/18  7:29 AM   Result Value Ref Range    Heparin Anti-Xa 0.97 (H) 0.30 - 0.70 IU/mL   ISTAT PROCEDURE    Collection Time: 05/31/18  7:33 AM   Result Value Ref Range    POC PH 7.525 (H) 7.35 - 7.45    POC PCO2 48.1 (H) 35 - 45 mmHg    POC PO2 25 (LL) 40 - 60 mmHg    POC HCO3 39.7 (H) 24 - 28 mmol/L    POC BE 17 -2 to 2 mmol/L    POC SATURATED O2 52 (L) 95 - 100 %    POC TCO2 41 (H) 24 - 29 mmol/L    Sample VENOUS     Site Other     Allens Test N/A     DelSys Nasal Can     Mode SPONT     Flow 35     FiO2 50    VANCOMYCIN, TROUGH before next dose    Collection Time: 05/31/18 12:53 PM   Result Value Ref Range    Vancomycin-Trough 14.9 10.0 - 22.0 ug/mL   Ammonia    Collection Time: 05/31/18 12:53 PM   Result Value Ref Range    Ammonia 45 10 - 50 umol/L   Basic metabolic panel    Collection Time: 05/31/18 12:53 PM   Result Value Ref Range    Sodium 144 136 - 145 mmol/L    Potassium 4.0 3.5 - 5.1 mmol/L    Chloride 100 95 - 110 mmol/L    CO2 35 (H) 23 - 29 mmol/L    Glucose 218 (H) 70 - 110 mg/dL    BUN, Bld 23 (H) 6 - 20 mg/dL    Creatinine 0.6 0.5 - 1.4 mg/dL    Calcium 8.0 (L) 8.7 - 10.5 mg/dL    Anion Gap 9 8 - 16 mmol/L    eGFR if African American >60.0 >60 mL/min/1.73 m^2    eGFR if non African American >60.0 >60 mL/min/1.73 m^2   Anti-Xa Heparin Monitoring    Collection Time: 05/31/18  5:34 PM   Result Value Ref Range    Heparin Anti-Xa 1.06 (H) 0.30 - 0.70 IU/mL   CBC auto differential    Collection Time: 05/31/18  5:35 PM   Result Value Ref Range    WBC 7.35 3.90 - 12.70 K/uL    RBC 3.04 (L) 4.00 - 5.40 M/uL    Hemoglobin 8.9 (L) 12.0 - 16.0 g/dL    Hematocrit 27.7 (L) 37.0 - 48.5 %    MCV 91 82 - 98 fL    MCH 29.3 27.0 - 31.0 pg    MCHC 32.1 32.0 - 36.0 g/dL    RDW 19.0 (H) 11.5 - 14.5 %    Platelets 123 (L) 150 - 350 K/uL     MPV 11.7 9.2 - 12.9 fL    Immature Granulocytes 0.7 (H) 0.0 - 0.5 %    Gran # (ANC) 7.1 1.8 - 7.7 K/uL    Immature Grans (Abs) 0.05 (H) 0.00 - 0.04 K/uL    Lymph # 0.0 (L) 1.0 - 4.8 K/uL    Mono # 0.1 (L) 0.3 - 1.0 K/uL    Eos # 0.0 0.0 - 0.5 K/uL    Baso # 0.01 0.00 - 0.20 K/uL    nRBC 4 (A) 0 /100 WBC    Gran% 97.2 (H) 38.0 - 73.0 %    Lymph% 0.1 (L) 18.0 - 48.0 %    Mono% 1.9 (L) 4.0 - 15.0 %    Eosinophil% 0.0 0.0 - 8.0 %    Basophil% 0.1 0.0 - 1.9 %    Differential Method Automated    Comprehensive metabolic panel    Collection Time: 06/01/18  2:28 AM   Result Value Ref Range    Sodium 145 136 - 145 mmol/L    Potassium 3.7 3.5 - 5.1 mmol/L    Chloride 102 95 - 110 mmol/L    CO2 35 (H) 23 - 29 mmol/L    Glucose 171 (H) 70 - 110 mg/dL    BUN, Bld 24 (H) 6 - 20 mg/dL    Creatinine 0.6 0.5 - 1.4 mg/dL    Calcium 7.7 (L) 8.7 - 10.5 mg/dL    Total Protein 4.4 (L) 6.0 - 8.4 g/dL    Albumin 2.1 (L) 3.5 - 5.2 g/dL    Total Bilirubin 0.5 0.1 - 1.0 mg/dL    Alkaline Phosphatase 217 (H) 55 - 135 U/L    AST 48 (H) 10 - 40 U/L     (H) 10 - 44 U/L    Anion Gap 8 8 - 16 mmol/L    eGFR if African American >60.0 >60 mL/min/1.73 m^2    eGFR if non African American >60.0 >60 mL/min/1.73 m^2   Magnesium    Collection Time: 06/01/18  2:28 AM   Result Value Ref Range    Magnesium 2.2 1.6 - 2.6 mg/dL   Phosphorus    Collection Time: 06/01/18  2:28 AM   Result Value Ref Range    Phosphorus 1.1 (L) 2.7 - 4.5 mg/dL   Anti-Xa Heparin Monitoring    Collection Time: 06/01/18  2:28 AM   Result Value Ref Range    Heparin Anti-Xa 0.44 0.30 - 0.70 IU/mL          Medical ROS  General ROS: negative for - chills, fatigue or fever  Respiratory ROS: no cough, shortness of breath, or wheezing  Cardiovascular ROS: no chest pain or dyspnea on exertion  Gastrointestinal ROS: no abdominal pain, change in bowel habits, or black or bloody stools  Musculoskeletal ROS: negative for - gait disturbance, joint stiffness, muscle pain or muscular  weakness  Neurological ROS: negative for - confusion, dizziness, gait disturbance, headaches, impaired coordination/balance, seizures or visual changes      Mental Status Exam: N/A  RASS- N/A  CAM ICU- N/A      Paula Robertson MS3  Psychiatry   [facility]  6/1/2018 9:22 AM
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
## 2018-06-01 NOTE — ASSESSMENT & PLAN NOTE
Pt with recent exacerbation of chronic generalized anxiety in setting of ICU hospitalization, recent intubation and respiratory failure. Patient currently prescribed Lexapro 20 mg by PCP, decreased Lexpro to 5 mg given continued decreased platelets. Anxiety likely to improve as respiratory status and other medical conditions improve. Medication options limited given respiratory status and delirium, would thus avoid benzodiazepines and anticholinergics for anxiety. Recommend continue frequent support from care team as you are doing, may also consider SW consult for counseling and additional support.    -continue escitalopram 5mg PO daily

## 2018-06-01 NOTE — PT/OT/SLP PROGRESS
Occupational Therapy      Patient Name:  Kamla Coulter   MRN:  [id number]    Patient not seen today secondary to pt going for CT scan to r/o PE's. OT to checks status at later date.     ANNABELLE Burns  6/1/2018

## 2018-06-01 NOTE — ASSESSMENT & PLAN NOTE
-- s/p voriconazole and steroid taper treated at [facility] end of 2017. Complicated by vision loss  -- continue isavuconazonium per ID. Continue steroids.

## 2018-06-01 NOTE — PROGRESS NOTES
Paged Speech Therapist at [phone] due to family concerns about swallow evaluation. No call back received.

## 2018-06-01 NOTE — PROGRESS NOTES
Paged Speech Therapist at [phone] due to family concerns about swallow evaluation. No call back received.

## 2018-06-01 NOTE — PLAN OF CARE
Patient remains on comfort O2 at 32L/50% FiO2, plan to continue weaning as tolerated.  SLP re-eval today, NGT dislodged overnight.  Psych following for delirium management.  PT/OT wesly, recs for SNF upon d/c.  Patient remains on Heparin gtt.  CM will continue to follow.     06/01/18 1034   Right Care Assessment   Can the patient answer the patient profile reliably? Yes, cognitively intact  (experiencing delirium)   How often would a person be available to care for the patient? Whenever needed   Describe the patient's ability to walk at the present time. Major restrictions/daily assistance from another person   How does the patient rate their overall health at the present time? Good   Number of comorbid conditions (as recorded on the chart) None   During the past month, has the patient often been bothered by feeling down, depressed or hopeless? Yes   Have you felt down, depressed, or hopeless? 3   During the past month, has the patient often been bothered by little interest or pleasure in doing things? No   Have you felt little interest or pleasure in doing things? (TELLO)   Xuan Waldrop RN, BSN, CCM  Case Management  [facility]  Ext. [phone]

## 2018-06-01 NOTE — PT/OT/SLP PROGRESS
Speech Language Pathology      Kamla Coulter  MRN: [id number]    Patient not seen today secondary to Other (Comment). Upon first attempt, Patient on CPAP.  SLP reviews Patient with MD team at afternoon rounds.  Patient remains on HFNC on 32L/50% FiO2  and is not appropriate for PO trials this service day. ST to continue to monitor. Thank you.    LEONEL Pierson., [facility]-SLP  Speech-Language Pathology  Pager: [phone]  6/1/2018

## 2018-06-01 NOTE — ASSESSMENT & PLAN NOTE
-- Improving  -- Likely multifactorial with respiratory secretion, asthma exacerbation, and volume overload all likely to be contributing  -- new PE findings on CT chest with contrast performed on 5/28/2018 with concerning questionable lingular branch PE. Heparin held in event of acute drop in H/H on 5/29/2018 and resumed am of 5/30/2018.   -- continue broad spectrum abx with vancomycin and meropenem covering for MRSA and Achromobacter xylogens per ID for +ve cultures from bronchial wash on EBUS and bronch on 5/22 and 5/24, respectively.   -- Continue Solumedrol 80mg bid.  -- duonebs [id number] + 3% NS + cough assist device + use Aerobika with duonebs when appropriate.  -- intubated on 5/28/2018 and bronchoscopy revealing mucus plugging of left main bronchial system. extubated on 5/29/2018 following repeat bronchoscopy.   -- CT chest with contrast on 5/28/2018 revealed no drainable collection at site of former abscess  -- holding lasix, +781 mL total yesterday. Will match I/O.  -- Extubated without complication. CPAP overnight.  -- out of bed with PT today.

## 2018-06-02 NOTE — PLAN OF CARE
Problem: SLP Goal  Goal: SLP Goal  Speech Language Pathology Goals  Goals expected to be met by 6/6/2018  1. Pt will participate in ongoing swallow assessment      Outcome: Ongoing (interventions implemented as appropriate)  Swallowing reassessed. Patient with significantly increased TETE this date. Recommend: Dental soft diet, thin liquids, crushed po medications, NO STRAWS, ONLY FEED WHEN AWAKE AND ALERT, monitor for s/s of aspiration. ST will continue to follow.   LEONEL Guy., CCC-SLP  Pager: [phone]  06/02/2018

## 2018-06-02 NOTE — ASSESSMENT & PLAN NOTE
-- hx of DVT in left lower extremity. Completed 3 month course of Eliquis late 2017   -- CTA 6/1/2018 revealed no PE. Heparin gtt held.

## 2018-06-02 NOTE — SUBJECTIVE & OBJECTIVE
Interval History/Significant Events:   NAEON. On CFNC overnight. CTA negative for PE. Heparin gtt held.    Review of Systems   Constitutional: no fever or chills  ENT: no nasal congestion or sore throat  Respiratory: no cough or shortness of breath  Cardiovascular: no chest pain or palpitations  Gastrointestinal: no nausea or vomiting, no abdominal pain   Genitourinary: no hematuria or dysuria  Integument/Breast: no rash or pruritis  Hematologic/Lymphatic: no easy bruising or lymphadenopathy  Musculoskeletal: no arthralgias or myalgias  Neurological: no seizures or tremors  Endocrine: no heat or cold intolerance    Objective:     Vital Signs (Most Recent):  Temp: 98.2 °F (36.8 °C) (06/02/18 0300)  Pulse: 104 (06/02/18 0726)  Resp: (!) 23 (06/02/18 0726)  BP: 129/89 (06/02/18 0500)  SpO2: 100 % (06/02/18 0726) Vital Signs (24h Range):  Temp:  [97.8 °F (36.6 °C)-98.3 °F (36.8 °C)] 98.2 °F (36.8 °C)  Pulse:  [] 104  Resp:  [14-43] 23  SpO2:  [92 %-100 %] 100 %  BP: ()/() 129/89   Weight: 81.4 kg (179 lb 7.3 oz)  Body mass index is 32.82 kg/m².      Intake/Output Summary (Last 24 hours) at 06/02/18 0740  Last data filed at 06/02/18 0500   Gross per 24 hour   Intake              500 ml   Output              905 ml   Net             -405 ml       Physical Exam  General: obese lady with Cushingoid facies. Comfortable on CFNC.   Eyes: chemosis improved with lateral conjunctival edema b/l, PERRL. EOMI.   Neck: supple, symmetrical, trachea midline, no JVD.  HENT: AT NC.   Cardiovascular: Heart: tachycardic, regular rhythm, S1, S2 normal, no murmur, click, rub or gallop.  Lungs: slight ronchi heard dispersedly.    Chest Wall: no tenderness.  Abdomen/Rectal: Abdomen: Non distended. No BS. No masses. No TTP.   Extremities: trace edema up to the ankles, no redness or tenderness in the calves or thighs. Pulses: 2+ and symmetric  Skin: Skin color, texture, turgor normal. No rashes or lesions  Musculoskeletal: full  range of motion of joints.   Lymph Nodes: No cervical or supraclavicular adenopathy  Psych/Behavioral: Normal. Alert and oriented.      Lines/Drains/Airways     Peripherally Inserted Central Catheter Line                 PICC Double Lumen 05/24/18 right brachial 9 days          Drain                 Urethral Catheter 05/24/18 16 Fr. 9 days              Significant Labs:    CBC/Anemia Profile:    Recent Labs  Lab 05/31/18  1735 06/02/18  0313   WBC 7.35 5.61   HGB 8.9* 8.3*   HCT 27.7* 25.7*   * 110*   MCV 91 93   RDW 19.0* 19.2*        Chemistries:    Recent Labs  Lab 05/31/18  1253 06/01/18  0228 06/02/18  0313    145 144   K 4.0 3.7 4.4    102 103   CO2 35* 35* 32*   BUN 23* 24* 16   CREATININE 0.6 0.6 0.6   CALCIUM 8.0* 7.7* 7.9*   ALBUMIN  --  2.1* 2.1*   PROT  --  4.4* 4.6*   BILITOT  --  0.5 0.6   ALKPHOS  --  217* 220*   ALT  --  107* 110*   AST  --  48* 53*   MG  --  2.2 2.1   PHOS  --  1.1* 3.1

## 2018-06-02 NOTE — ASSESSMENT & PLAN NOTE
-- likely due to being on steroids chronically.   -- a1c 5.2, good poct glucose measurements earlier in admission.   -- initiate TF if patient fails swallow eval this am.

## 2018-06-02 NOTE — ASSESSMENT & PLAN NOTE
-- Improved.    -- Likely multifactorial with respiratory secretion, asthma exacerbation, and volume overload all likely to be contributing  -- CTA on 6/1/2018 negative for PE. Heparin gtt stopped.   -- completed course of abx for possible HAP.   -- Continue Solumedrol 80mg IV bid.  -- duonebs h9doxypl + 3% NS + cough assist device + use Aerobika with duonebs when appropriate.  -- intubated on 5/28/2018 and bronchoscopy revealing mucus plugging of left main bronchial system. extubated on 5/29/2018 following repeat bronchoscopy.   -- CT chest with contrast on 5/28/2018 revealed no drainable collection at site of former abscess.   -- continue to hold lasix, currently -ve 400ml over lasdt 24 hours and negative 2L since admission.  -- out of bed with PT daily.

## 2018-06-02 NOTE — PT/OT/SLP EVAL
"Speech Language Pathology Evaluation  Bedside Swallow  Re-assessment    Patient Name:  Kamla Coulter   MRN:  [id number]   [id number]    Admitting Diagnosis: Acute hypoxemic respiratory failure    Recommendations:                 General Recommendations:  Dysphagia therapy  Diet recommendations:  Dental Soft, Thin   Aspiration Precautions:   · 1 bite/sip at a time,   · Assistance with meals,   · Check for pocketing/oral residue,   · Eliminate distractions,   · Feed only when awake/alert,   · Frequent oral care,   · HOB to 90 degrees,   · Meds crushed in puree,   · Monitor for s/s of aspiration,   · No straws,   · Remain upright 30 minutes post meal,   · Small bites/sips,   · Strict aspiration precautions and   · Wear oxygen during intake   General Precautions: Standard, aspiration, contact, respiratory, dental soft  Communication strategies:  none    History:     Past Medical History:   Diagnosis Date    ABPA (allergic bronchopulmonary aspergillosis)     Allergy     Anxiety     Arachnoid cyst     monitoring    Asthma     COPD (chronic obstructive pulmonary disease)     Hemoptysis     Hypertension     Tachycardia     Thalamic disease     thalamia minor       Past Surgical History:   Procedure Laterality Date    BREAST SURGERY      breast bx     [facility]  10/2016    [facility]     SECTION      CHOLECYSTECTOMY      COLONOSCOPY N/A 2017    Procedure: COLONOSCOPY;  Surgeon: Horacio Pelaez MD;  Location: [facility];  Service: General;  Laterality: N/A;    DENTAL SURGERY      GALLBLADDER SURGERY      HERNIA REPAIR      INTRAUTERINE DEVICE INSERTION      SINUS SURGERY       MD Note: "This is a 57 year old female with hx of severe persistent asthma on Xolair, prolonged immunosuppression on steroids, ABPA,bronchiectasis, HTN, DVT, and GERD who is transferred from [facility] for higher level of care. She initially presented to the OSH on May second for progressive " shortness of breath, wheezing and orthopnea over a period of 2-3 days associated with low-grade fever. She was admitted to the hospital medicine floor and started on IV solumedrol 40mg bid and was placed on BiPAP. She was weaned off to NC on the second day of admission. Blood cultures from admission grew MRSA bacteremia and patient was started on vancomycin. Urine and sputum cultures both grew MRSA. The patient had persistent MRSA bacteremia despite being on vancomycin and a SHEILA was done on 5/8/2018 that was negative for endocarditis and revealed normal EF%. A bone scan on 5/10/2018 was similarly negative for an infection nidus. On 5/10/2018 the patient developed an acute hypoxic respiratory failure that did not improve promptly on BiPAP with an FiO2 of 100% and the patient was moved to the ICU and started empirically on therapeutic Lovenox. A CTA done on the same day revealed no PE but was notable for worsening bilateral pleural effusion and a non-specific infiltrate/collection around the mid-esophagus. The collection was thought to be a loculated pleural effusion. IV solumedrol was increased to 80mg twice daily and Lovenox was decreased to ppx dosing. By 5/12/2018 the patient's respiratory failure had improved and patient was weaned off of BiPAP to nasal cannula. Sputum cultures collected on 5/13/2018 showed budding yeast and the patient was started on voriconazole. On 5/15/2018 the patient had worsening hypoxia and increased work of breathing and a CXR showed HAP and Ceftaroline was added to vancomycin. On 5/16/2018, repeat blood cultures showed persistent MRSA bacteremia and an Indium scan performed on 5/18/2018 revealed significant uptake by a collection located adjacent to the mid-esophagus. The patient was electively intubated on 5/22/2018 and an EGD revealed a whitish plaque at the proximal esophagus that was biopsied and resulted negative for malignant changes. Similarly, on the same day, an EBUS was  performed and showed fluid collection posterior to the distal trachea that was better assessed through 5 FNA passes. Cultures from the EBUS grew MRSA + Alcaligenes/Achromobcater Xylosoxidens. However, FNA was negative for any malignancies. The patient was extubated the same day. Two days later (5/24/2018), the patient developed an acute decompensated respiratory failure with oxygen saturations dropping to the 60's and the patient was emergently intubated. CXR showed left lower lobe atelectasis. An emergent bronchoscopy revealed a mucus plug in the left mainstem bronchus. The mucus plug was brown, thick, and difficult to suction raising suspicion for Aspergillus infection. Following mucus plug disimpaction, the patient's respiratory status recovered immediately and the patient was extubated the next morning (5/25/2018). Prior to that a repeat CT chest showed persistent collection around the mid-esophagus. At this point, it was decided that the patient was better served at a higher level of care facility and the patient was [place] [facility] The patient arrived on BiPAP 10/5 and a 45% FiO2 with good oxygen saturation.   Hospital/ICU Course:  Admitted as a transfer from [facility] on 5/25/2018 for higher level of care. Patient required BiPAP on admission but was able to wean to HFNC on second day of hospitalization. ID was consulted and had made changes to antimicrobials with continuing vancomycin, switching voriconazole to isavuconazonium and zosyn to meropenem. Blood cultures showed NGTD.  05/28/2018 Worsening respiratory status requiring intubation morning of 5/28/2018 for oxygen saturation in the lower 70's with emergent bronchoscopy done following intubation revealing left system mucus plugging. Following suctioning of mucus plugging oxygen saturation had finally improved. Repeat CT chest did not show mediastinal collection.  05/29/2018 drop in H/H requiring two units of pRBC. Heparin held. Plan for  "extubation today. Continuing diuresis and management of resp secretions. CT abdomen revealed no intraabdominal source of bleeding. Patient had repeat bronchoscopy and extubated to [facility].  05/30/2018 resuming heparin gtt and weaning oxygen requirement prn. Delirious overnight.   06/01/2018 NG removed by patient overnight. Patient with SLP repeat eval this am. Continue weaning off oxygen.   06/02/2018 CTA negative for PE and heparin held. Speech eval today."     Chest X-Rays:   Results for orders placed or performed during the hospital encounter of 05/25/18   X-Ray Chest 1 View    Narrative    EXAMINATION:  XR CHEST 1 VIEW    CLINICAL HISTORY:  evaluation of pleural effusions s/p diuresis;    TECHNIQUE:  Single frontal view of the chest was performed.    COMPARISON:  Radiograph 05/29/2018.    FINDINGS:  Right-sided central venous catheter with tip projected over the cavoatrial junction.  Enteric catheter crosses the diaphragm.  Cardiopulmonary status is unchanged noting stable size of the cardiac silhouette on persistent increased attenuation within the middle and lower lung zones which suggests sequela of dependent pleural effusions with atelectasis or airspace consolidation of the adjacent lung bases.  No pneumothorax.  No free air beneath the diaphragm.      Impression    1. No interval detrimental change.  2. Bilateral dependent pleural effusions with atelectasis or airspace consolidation of the adjacent lung.      Electronically signed by: Mike Quintana MD  Date:    05/31/2018  Time:    07:19       Social History: Patient lives with Spouse in [place], [place].     Prior Intubation HX:  5/28/18 -5/29/18    Prior diet: Patient and family report h/o dysphagia with straw sips of liquids only.     Occupation/hobbies/homemaking: Retired [employer].    Subjective     MD paged SLP for re-evaluation of swallowing 2/2 patient pulled NG tube and is refusing re-placement.  Patient awake with family present in room. Family " reporting significantly improved TETE from previous ST trials.   Patient goals: to eat    Pain/Comfort:  · Pain Rating 1: 0/10    Objective:     Oral Musculature Evaluation  · Oral Musculature: general weakness  · Dentition: present and adequate  · Secretion Management: adequate  · Mucosal Quality: dry  · Mandibular Strength and Mobility: impaired  · Oral Labial Strength and Mobility: functional coordination, functional seal, functional retraction  · Lingual Strength and Mobility: impaired strength  · Volitional Cough: elicited  · Volitional Swallow: timely  · Voice Prior to PO Intake: clear, strong    Bedside Swallow Eval:   Consistencies Assessed:  · Thin liquids ice chip x1, tsp sip x2, cup sips x6, straw sips x5  · Puree tsp bites of pudding x2  · Solids bites of tommy cracker x7     Oral Phase:   · WFL  · Dry mouth    Pharyngeal Phase:   · coughing/choking with 1 straw sip of water only  · No overt s/s of aspiration with all further trials.   · O2 sats remained between 100-98 throughout evaluation    Compensatory Strategies  · None    Treatment: SLP provided patient and family education on SLP role, s/s and risks of aspiraiton, safe swallow precautions, POC, and importance of patient being awake, alert, and seated upright to eat/drink. Patient and family verbalized understanding of all discussed. Patient and family in agreement with POC.  SLP communicated recommendations with MD and RN.       Assessment:     Kamla Coulter is a 57 y.o. female with an SLP diagnosis of Dysphagia.  She presents with improved level of alertness and improved tolerance of po trials. ST will continue to follow.     Goals:    SLP Goals        Problem: SLP Goal    Goal Priority Disciplines Outcome   SLP Goal     SLP Ongoing (interventions implemented as appropriate)   Description:  Speech Language Pathology Goals  Goals expected to be met by 6/6/2018  1. Pt will participate in ongoing swallow assessment                       Plan:      · Patient to be seen:  4 x/week   · Plan of Care expires:  07/01/18  · Plan of Care reviewed with:  patient, family   · SLP Follow-Up:  Yes       Discharge recommendations:  nursing facility, skilled   Barriers to Discharge:  Level of Skilled Assistance Needed      Time Tracking:     SLP Treatment Date:   06/02/18  Speech Start Time:  0848  Speech Stop Time:  0905     Speech Total Time (min):  17 min    Billable Minutes: Eval Swallow and Oral Function 17     EUSEBIO Sev[place], CCC-SLP   Pager: [phone]  06/02/2018

## 2018-06-02 NOTE — PROGRESS NOTES
[facility]  Critical Care Medicine  Progress Note    Patient Name: Kamla Coulter  MRN: [id number]  Admission Date: 5/25/2018  Hospital Length of Stay: 8 days  Code Status: Full Code  Attending Provider: Mario Stallings MD  Primary Care Provider: Molina Alexandre MD   Principal Problem: Acute hypoxemic respiratory failure    Subjective:     HPI:    This is a 57 year old female with hx of severe persistent asthma on Xolair, prolonged immunosuppression on steroids, ABPA,bronchiectasis, HTN, DVT, and GERD who is transferred from [facility] for higher level of care. She initially presented to the OSH on May second for progressive shortness of breath, wheezing and orthopnea over a period of 2-3 days associated with low-grade fever. She was admitted to the hospital medicine floor and started on IV solumedrol 40mg bid and was placed on BiPAP. She was weaned off to NC on the second day of admission. Blood cultures from admission grew MRSA bacteremia and patient was started on vancomycin. Urine and sputum cultures both grew MRSA. The patient had persistent MRSA bacteremia despite being on vancomycin and a SHEILA was done on 5/8/2018 that was negative for endocarditis and revealed normal EF%. A bone scan on 5/10/2018 was similarly negative for an infection nidus. On 5/10/2018 the patient developed an acute hypoxic respiratory failure that did not improve promptly on BiPAP with an FiO2 of 100% and the patient was moved to the ICU and started empirically on therapeutic Lovenox. A CTA done on the same day revealed no PE but was notable for worsening bilateral pleural effusion and a non-specific infiltrate/collection around the mid-esophagus. The collection was thought to be a loculated pleural effusion. IV solumedrol was increased to 80mg twice daily and Lovenox was decreased to ppx dosing. By 5/12/2018 the patient's respiratory failure had improved and patient was weaned off of BiPAP to  nasal cannula. Sputum cultures collected on 5/13/2018 showed budding yeast and the patient was started on voriconazole. On 5/15/2018 the patient had worsening hypoxia and increased work of breathing and a CXR showed HAP and Ceftaroline was added to vancomycin. On 5/16/2018, repeat blood cultures showed persistent MRSA bacteremia and an Indium scan performed on 5/18/2018 revealed significant uptake by a collection located adjacent to the mid-esophagus. The patient was electively intubated on 5/22/2018 and an EGD revealed a whitish plaque at the proximal esophagus that was biopsied and resulted negative for malignant changes. Similarly, on the same day, an EBUS was performed and showed fluid collection posterior to the distal trachea that was better assessed through 5 FNA passes. Cultures from the EBUS grew MRSA + Alcaligenes/Achromobcater Xylosoxidens. However, FNA was negative for any malignancies. The patient was extubated the same day. Two days later (5/24/2018), the patient developed an acute decompensated respiratory failure with oxygen saturations dropping to the 60's and the patient was emergently intubated. CXR showed left lower lobe atelectasis. An emergent bronchoscopy revealed a mucus plug in the left mainstem bronchus. The mucus plug was brown, thick, and difficult to suction raising suspicion for Aspergillus infection. Following mucus plug disimpaction, the patient's respiratory status recovered immediately and the patient was extubated the next morning (5/25/2018). Prior to that a repeat CT chest showed persistent collection around the mid-esophagus. At this point, it was decided that the patient was better served at a higher level of care facility and [place] [facility] patient arrived on BiPAP 10/5 and a 45% FiO2 with good oxygen saturation.       Hospital/ICU Course:  Admitted as a transfer from [facility] on 5/25/2018 for higher level of care. Patient required BiPAP on  admission but was able to wean to HFNC on second day of hospitalization. ID was consulted and had made changes to antimicrobials with continuing vancomycin, switching voriconazole to isavuconazonium and zosyn to meropenem. Blood cultures showed NGTD.  05/28/2018 Worsening respiratory status requiring intubation morning of 5/28/2018 for oxygen saturation in the lower 70's with emergent bronchoscopy done following intubation revealing left system mucus plugging. Following suctioning of mucus plugging oxygen saturation had finally improved. Repeat CT chest did not show mediastinal collection.    05/29/2018 drop in H/H requiring two units of pRBC. Heparin held. Plan for extubation today. Continuing diuresis and management of resp secretions. CT abdomen revealed no intraabdominal source of bleeding. Patient had repeat bronchoscopy and extubated to [facility].    05/30/2018 resuming heparin gtt and weaning oxygen requirement prn. Delirious overnight.       06/01/2018 NG removed by patient overnight. Patient with SLP repeat eval this am. Continue weaning off oxygen.     06/02/2018 CTA negative for PE and heparin held. Speech eval today.       Interval History/Significant Events:   NAEON. Reportedly restless overnight due to lack of sleep. On CFNC overnight. CTA negative for PE. Heparin gtt held.    Review of Systems   Constitutional: no fever or chills  ENT: no nasal congestion or sore throat  Respiratory: no cough or shortness of breath  Cardiovascular: no chest pain or palpitations  Gastrointestinal: no nausea or vomiting, no abdominal pain   Genitourinary: no hematuria or dysuria  Integument/Breast: no rash or pruritis  Hematologic/Lymphatic: no easy bruising or lymphadenopathy  Musculoskeletal: no arthralgias or myalgias  Neurological: no seizures or tremors  Endocrine: no heat or cold intolerance    Objective:     Vital Signs (Most Recent):  Temp: 98.2 °F (36.8 °C) (06/02/18 0300)  Pulse: 104 (06/02/18 0726)  Resp: (!) 23  (06/02/18 0726)  BP: 129/89 (06/02/18 0500)  SpO2: 100 % (06/02/18 0726) Vital Signs (24h Range):  Temp:  [97.8 °F (36.6 °C)-98.3 °F (36.8 °C)] 98.2 °F (36.8 °C)  Pulse:  [] 104  Resp:  [14-43] 23  SpO2:  [92 %-100 %] 100 %  BP: ()/() 129/89   Weight: 81.4 kg (179 lb 7.3 oz)  Body mass index is 32.82 kg/m².      Intake/Output Summary (Last 24 hours) at 06/02/18 0740  Last data filed at 06/02/18 0500   Gross per 24 hour   Intake              500 ml   Output              905 ml   Net             -405 ml       Physical Exam  General: obese lady with Cushingoid facies. Comfortable on CFNC.   Eyes: chemosis improved with lateral conjunctival edema b/l, PERRL. EOMI.   Neck: supple, symmetrical, trachea midline, no JVD.  HENT: AT NC.   Cardiovascular: Heart: tachycardic, regular rhythm, S1, S2 normal, no murmur, click, rub or gallop.  Lungs: slight ronchi heard dispersedly.    Chest Wall: no tenderness.  Abdomen/Rectal: Abdomen: Non distended. No BS. No masses. No TTP.   Extremities: trace edema up to the ankles, no redness or tenderness in the calves or thighs. Pulses: 2+ and symmetric  Skin: Skin color, texture, turgor normal. No rashes or lesions  Musculoskeletal: full range of motion of joints.   Lymph Nodes: No cervical or supraclavicular adenopathy  Psych/Behavioral: Normal. Alert and oriented.      Lines/Drains/Airways     Peripherally Inserted Central Catheter Line                 PICC Double Lumen 05/24/18 right brachial 9 days          Drain                 Urethral Catheter 05/24/18 16 Fr. 9 days              Significant Labs:    CBC/Anemia Profile:    Recent Labs  Lab 05/31/18  1735 06/02/18  0313   WBC 7.35 5.61   HGB 8.9* 8.3*   HCT 27.7* 25.7*   * 110*   MCV 91 93   RDW 19.0* 19.2*        Chemistries:    Recent Labs  Lab 05/31/18  1253 06/01/18  0228 06/02/18  0313    145 144   K 4.0 3.7 4.4    102 103   CO2 35* 35* 32*   BUN 23* 24* 16   CREATININE 0.6 0.6 0.6   CALCIUM  8.0* 7.7* 7.9*   ALBUMIN  --  2.1* 2.1*   PROT  --  4.4* 4.6*   BILITOT  --  0.5 0.6   ALKPHOS  --  217* 220*   ALT  --  107* 110*   AST  --  48* 53*   MG  --  2.2 2.1   PHOS  --  1.1* 3.1         Assessment/Plan:     Neuro   Delirium    - likely due to prolonged ICU stay.  - prn Zyprexa.  - resolved.   - delirium precautions        Psychiatric   Generalized anxiety disorder    - zyprexa PRN   - Appreciate psych recommendations.          Ophtho   Conjunctival edema of both eyes    -- likely due to voriconazole currently improving after switching to isavuconazonium.           Pulmonary   * Acute hypoxemic respiratory failure    -- Improved.    -- Likely multifactorial with respiratory secretion, asthma exacerbation, and volume overload all likely to be contributing  -- CTA on 6/1/2018 negative for PE. Heparin gtt stopped.   -- completed course of abx for possible HAP.   -- Continue Solumedrol 80mg IV bid.  -- duonebs [id number] + 3% NS + cough assist device + use Aerobika with duonebs when appropriate.  -- intubated on 5/28/2018 and bronchoscopy revealing mucus plugging of left main bronchial system. extubated on 5/29/2018 following repeat bronchoscopy.   -- CT chest with contrast on 5/28/2018 revealed no drainable collection at site of former abscess.   -- continue to hold lasix, currently -ve 400ml over lasdt 24 hours and negative 2L since admission.  -- out of bed with PT daily.         Atelectasis    -- stable  -- continue home vest during the day        Achromobacter pneumonia    -- completed 7 days of Meropenem on 6/1/2018.        Severe persistent asthma    -- Stable  -- On Xolair and prednisone taper at home  -- continue solumedrol 80 mg IV BID  -- continue home spiriva, breo, and singulair.  -- rest as described in hypoxic resp failure.        Cardiac/Vascular   Mixed hyperlipidemia    -- Continue home pravastatin.         Essential hypertension    -- resume verapamil at 80mg Q8H when stable.        Renal/    Metabolic alkalosis    -- likely due to contraction alkalosis. Now improving after holding lasix.        ID   ABPA (allergic bronchopulmonary aspergillosis)    -- s/p voriconazole and steroid taper treated at [facility] end of 2017. Complicated by vision loss  -- continue isavuconazonium per ID. Continue steroids.         MRSA bacteremia     -- MRSA grew initially in urine, then blood, and finally in mediastinal collection.   -- SHEILA performed 5/8/2018 was negative for endocarditis.  -- hx of prolonged immunosuppression on Prednisone and Xolair predisposing patient to fungal infection.   -- continue IV Vancomycin for total of 6 weeks per ID beginning 5/25/2018 and follow up in ID clinic with weekly labs.   -- bronchial washing on 5/22/2018 grew MRSA.           Mediastinal collection    Bilateral pleural effusion     -- ill-defined mediastinal collection with mediastinal abscess, loculated pleural effusion, and complex pleural effusion are among the possible differentials.  -- GS and culture during EBUS from bronchial washing at OSH grew MRSA + Achromobacter.   -- had 3-4 FNA passes on EBUS that showed no malignancy. Apparently, no samples were obtained from mediastinal abscess during EBUS at OSH on 5/22/2018.  -- continue broad spectrum abx including vancomycin and Meropenem treating for MRSA bacteremia and achromobacter from BAL on 5/22 and 5/24.    -- repeat CT chest with contrast on 5/28/2018 here revealed no persistent mediastinal collection amenable to drainage.   -- abscess likely has improved following appropriate abx treatment. No need for surgical/IR intervention at this time.              Hematology   Thrombocytopenia    -- slowly down trending plt pending this am.   -- sending for HIT but proceeding with resuming the heparin gtt  -- 4T score of 4, intermediate.   -- plt continues to down trend stably at 110. HIT panel pending.        History of DVT of lower extremity    -- hx of DVT in left lower  extremity. Completed 3 month course of Eliquis late 2017   -- CTA 6/1/2018 revealed no PE. Heparin gtt held.          Oncology   Hereditary spherocytosis    -- CBC pending.  -- Haptoglobin > 250 at time of anemia re-assuring.        Endocrine   Impaired glucose tolerance    -- likely due to being on steroids chronically.   -- a1c 5.2, good poct glucose measurements earlier in admission.   -- initiate TF if patient fails swallow eval this am.          GI   Abdominal wall fluid collections    -- likely dependent edema > abdo wall hematoma.  -- continue to observe.         Abnormal LFTs    -- likely due to drug adverse effect suspected from voriconazole. Switched to isavuconazonium in setting of conjunctival swelling now with improving LFT's.   -- LFT's improving.        GERD (gastroesophageal reflux disease)    -- Stable  -- Continue famotidine             Critical Care Daily Checklist:    A: Awake: RASS Goal/Actual Goal: RASS Goal: 0-->alert and calm  Actual: Ricks Agitation Sedation Scale (RASS): Alert and calm   B: Spontaneous Breathing Trial Performed?     C: SAT & SBT Coordinated?  n/a                      D: Delirium: CAM-ICU Overall CAM-ICU: Negative   E: Early Mobility Performed? yes   F: Feeding Goal: Goals: Meet % EEN, EPN  Status: Nutrition Goal Status: new   Current Diet Order   Procedures    Diet NPO      AS: Analgesia/Sedation prn   T: Thromboembolic Prophylaxis lovenox   H: HOB > 300 yes   U: Stress Ulcer Prophylaxis (if needed) pepcid   G: Glucose Control prn   B: Bowel Function Stool Occurrence: 1   I: Indwelling Catheter (Lines & Olivares) Necessity yes   D: De-escalation of Antimicrobials/Pharmacotherapies yes    Plan for the day/ETD Continue supportive care, swallow eval    Code Status:  Family/Goals of Care: Full Code         Hien Pressley MD  Critical Care Medicine  [facility]

## 2018-06-02 NOTE — ASSESSMENT & PLAN NOTE
-- s/p voriconazole and steroid taper treated at [facility] end of 2017. Complicated by vision loss  -- continue isavuconazonium per ID. Continue steroids.

## 2018-06-02 NOTE — ASSESSMENT & PLAN NOTE
-- slowly down trending plt pending this am.   -- sending for HIT but proceeding with resuming the heparin gtt  -- 4T score of 4, intermediate.   -- plt continues to down trend stably at 110. HIT panel pending.

## 2018-06-03 NOTE — ASSESSMENT & PLAN NOTE
-- Improved.    -- Likely multifactorial with respiratory secretion, asthma exacerbation, and volume overload all likely interchangeably contributing.   -- CTA on 6/1/2018 negative for PE. Heparin gtt stopped.   -- completed course of abx for possible HAP.   -- decrease Solumedrol to 80mg IV daily with plans on tapering to home prednisone dose of (40mg-60mg) over the next few days with improved respiratory status.  -- duonebs [id number] + 3% NS + cough assist device + use Aerobika with duonebs when appropriate.  -- intubated on 5/28/2018 and bronchoscopy revealing mucus plugging of left main bronchial system. extubated on 5/29/2018 following repeat bronchoscopy.   -- CT chest with contrast on 5/28/2018 revealed no drainable collection at site of former abscess.   -- continue to hold lasix, currently -ve 400ml over lasdt 24 hours and negative 2L since admission.  -- out of bed with PT daily.   -- CPAP at night.

## 2018-06-03 NOTE — ASSESSMENT & PLAN NOTE
-- likely due to contraction alkalosis. Continues to improve to HCO3 of 30 with holding diuresis.   -- net +210ml last 24 hours.

## 2018-06-03 NOTE — ASSESSMENT & PLAN NOTE
-- Stable  -- On Xolair and prednisone taper at home  -- continue solumedrol 80 mg IV daily.   -- continue home spiriva, breo, and singulair.  -- rest as described in hypoxic resp failure.

## 2018-06-03 NOTE — ASSESSMENT & PLAN NOTE
-- likely due to drug adverse effect suspected from voriconazole. Switched to isavuconazonium in setting of conjunctival swelling now with improving LFT's.   -- LFT's stable.

## 2018-06-03 NOTE — NURSING
"During bedside report - pm rn noted pupils were different sizes (R 4 L3 - sluggish); pt had no neuro deficits-moving all extremities the same; answering questions approp but "slowed" r/t to comfort flow pressure as she had done all day.  Dr Adams came to bedside and assessed.  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  Pt's sats probe was changed also and sats were 94% and correlated to HR.  "

## 2018-06-03 NOTE — ASSESSMENT & PLAN NOTE
-- MRSA grew initially in urine, then blood, and finally in mediastinal collection.   -- SHEILA performed 5/8/2018 was negative for endocarditis.  -- hx of prolonged immunosuppression on Prednisone and Xolair predisposing patient to fungal infection.   -- continue IV Vancomycin for total of 6 weeks per ID beginning 5/25/2018 and follow up in ID clinic with weekly labs.   -- bronchial washing on 5/22/2018 grew MRSA.  -- vanc trough 6/2/2018 was low at 9. Increasing dose to 1250mg bid from 1500mg daily.

## 2018-06-03 NOTE — ASSESSMENT & PLAN NOTE
- likely due to prolonged ICU stay and receiving benzodiazepines (Xanax).  - prn Zyprexa nightly for delirium and agitation.  - delirium precautions. Resolved.

## 2018-06-03 NOTE — SUBJECTIVE & OBJECTIVE
Interval History/Significant Events:   Reportedly, patient had an anxiety attack after her  had left to go home to sleep resulting in tachypnea. The patient was difficult to control and her RR had reached at one point to 52 bpm. SpO2 remained >95%. Patient was started on CPAP around midnight, which reportedly helped a little. However, became more anxious this early morning. CXR and ABG were obtained which revealed interval improvement in lung imaging and respiratory alkalosis, respectively. Patient was switched to BiPAP 18/8. Patient continued to be restless on BiPAP and was given one IV dose of fentanyl.     Review of Systems   Unable to obtain given patient's factors.    Objective:     Vital Signs (Most Recent):  Temp: 97.9 °F (36.6 °C) (06/03/18 0701)  Pulse: 102 (06/03/18 0701)  Resp: (!) 27 (06/03/18 0701)  BP: (!) 174/81 (06/03/18 0701)  SpO2: 97 % (06/03/18 0701) Vital Signs (24h Range):  Temp:  [97.9 °F (36.6 °C)-98.8 °F (37.1 °C)] 97.9 °F (36.6 °C)  Pulse:  [102-127] 102  Resp:  [25-58] 27  SpO2:  [84 %-100 %] 97 %  BP: (120-174)/() 174/81   Weight: 81.4 kg (179 lb 7.3 oz)  Body mass index is 32.82 kg/m².      Intake/Output Summary (Last 24 hours) at 06/03/18 0753  Last data filed at 06/03/18 0701   Gross per 24 hour   Intake              785 ml   Output              570 ml   Net              215 ml       Physical Exam  General: obese lady with Cushingoid facies. Comfortable on CPAP mask.   Eyes: chemosis improved with lateral conjunctival edema b/l, PERRL. EOMI.   Neck: supple, symmetrical, trachea midline, no JVD.  HENT: AT NC.   Cardiovascular: Heart: tachycardic, regular rhythm, S1, S2 normal, no murmur, click, rub or gallop.  Lungs: slight ronchi heard dispersedly.    Chest Wall: no tenderness.  Abdomen/Rectal: Abdomen: Non distended. No BS. No masses. No TTP.   Extremities: trace edema up to the ankles, no redness or tenderness in the calves or thighs. Pulses: 2+ and symmetric  Skin: Skin  color, texture, turgor normal. No rashes or lesions  Musculoskeletal: full range of motion of joints.   Lymph Nodes: No cervical or supraclavicular adenopathy  Psych/Behavioral: Normal. Alert and oriented.    Lines/Drains/Airways     Peripherally Inserted Central Catheter Line                 PICC Double Lumen 05/24/18 right brachial 10 days          Drain                 Urethral Catheter 05/24/18 16 Fr. 10 days              Significant Labs:    CBC/Anemia Profile:    Recent Labs  Lab 06/02/18  0313 06/03/18  0435   WBC 5.61 3.35*   HGB 8.3* 7.7*   HCT 25.7* 24.2*   * 116*   MCV 93 92   RDW 19.2* 19.2*        Chemistries:    Recent Labs  Lab 06/02/18  0313 06/03/18  0435    143   K 4.4 3.7    103   CO2 32* 30*   BUN 16 20   CREATININE 0.6 0.6   CALCIUM 7.9* 7.8*   ALBUMIN 2.1* 2.1*   PROT 4.6* 4.5*   BILITOT 0.6 0.6   ALKPHOS 220* 256*   * 123*   AST 53* 75*   MG 2.1 2.3   PHOS 3.1 2.0*           Significant Imaging:    CXR this am with interval improvement.

## 2018-06-03 NOTE — PROGRESS NOTES
[facility]  Critical Care Medicine  Progress Note    Patient Name: Kamla Coulter  MRN: [id number]  Admission Date: 5/25/2018  Hospital Length of Stay: 9 days  Code Status: Full Code  Attending Provider: Mario Stallings MD  Primary Care Provider: Molina Alexandre MD   Principal Problem: Acute hypoxemic respiratory failure    Subjective:     HPI:    This is a 57 year old female with hx of severe persistent asthma on Xolair, prolonged immunosuppression on steroids, ABPA,bronchiectasis, HTN, DVT, and GERD who is transferred from [facility] for higher level of care. She initially presented to the OSH on May second for progressive shortness of breath, wheezing and orthopnea over a period of 2-3 days associated with low-grade fever. She was admitted to the hospital medicine floor and started on IV solumedrol 40mg bid and was placed on BiPAP. She was weaned off to NC on the second day of admission. Blood cultures from admission grew MRSA bacteremia and patient was started on vancomycin. Urine and sputum cultures both grew MRSA. The patient had persistent MRSA bacteremia despite being on vancomycin and a SHEILA was done on 5/8/2018 that was negative for endocarditis and revealed normal EF%. A bone scan on 5/10/2018 was similarly negative for an infection nidus. On 5/10/2018 the patient developed an acute hypoxic respiratory failure that did not improve promptly on BiPAP with an FiO2 of 100% and the patient was moved to the ICU and started empirically on therapeutic Lovenox. A CTA done on the same day revealed no PE but was notable for worsening bilateral pleural effusion and a non-specific infiltrate/collection around the mid-esophagus. The collection was thought to be a loculated pleural effusion. IV solumedrol was increased to 80mg twice daily and Lovenox was decreased to ppx dosing. By 5/12/2018 the patient's respiratory failure had improved and patient was weaned off of BiPAP to  nasal cannula. Sputum cultures collected on 5/13/2018 showed budding yeast and the patient was started on voriconazole. On 5/15/2018 the patient had worsening hypoxia and increased work of breathing and a CXR showed HAP and Ceftaroline was added to vancomycin. On 5/16/2018, repeat blood cultures showed persistent MRSA bacteremia and an Indium scan performed on 5/18/2018 revealed significant uptake by a collection located adjacent to the mid-esophagus. The patient was electively intubated on 5/22/2018 and an EGD revealed a whitish plaque at the proximal esophagus that was biopsied and resulted negative for malignant changes. Similarly, on the same day, an EBUS was performed and showed fluid collection posterior to the distal trachea that was better assessed through 5 FNA passes. Cultures from the EBUS grew MRSA + Alcaligenes/Achromobcater Xylosoxidens. However, FNA was negative for any malignancies. The patient was extubated the same day. Two days later (5/24/2018), the patient developed an acute decompensated respiratory failure with oxygen saturations dropping to the 60's and the patient was emergently intubated. CXR showed left lower lobe atelectasis. An emergent bronchoscopy revealed a mucus plug in the left mainstem bronchus. The mucus plug was brown, thick, and difficult to suction raising suspicion for Aspergillus infection. Following mucus plug disimpaction, the patient's respiratory status recovered immediately and the patient was extubated the next morning (5/25/2018). Prior to that a repeat CT chest showed persistent collection around the mid-esophagus. At this point, it was decided that the patient was better served at a higher level of care facility and the patient was transferred to [facility]. The patient arrived on BiPAP 10/5 and a 45% FiO2 with good oxygen saturation.       Hospital/ICU Course:  Admitted as a transfer from [facility] on 5/25/2018 for higher level of care. Patient required BiPAP on  admission but was able to wean to HFNC on second day of hospitalization. ID was consulted and had made changes to antimicrobials with continuing vancomycin, switching voriconazole to isavuconazonium and zosyn to meropenem. Blood cultures showed NGTD.  05/28/2018 Worsening respiratory status requiring intubation morning of 5/28/2018 for oxygen saturation in the lower 70's with emergent bronchoscopy done following intubation revealing left system mucus plugging. Following suctioning of mucus plugging oxygen saturation had finally improved. Repeat CT chest did not show mediastinal collection.    05/29/2018 drop in H/H requiring two units of pRBC. Heparin held. Plan for extubation today. Continuing diuresis and management of resp secretions. CT abdomen revealed no intraabdominal source of bleeding. Patient had repeat bronchoscopy and extubated to [facility].    05/30/2018 resuming heparin gtt and weaning oxygen requirement prn. Delirious overnight.       06/01/2018 NG removed by patient overnight. Patient with SLP repeat eval this am. Continue weaning off oxygen.     06/02/2018 CTA negative for PE and heparin held. Speech eval today.     06/03/2018 continue supportive care and weaning oxygen requirement and steroids.         Interval History/Significant Events:   Reportedly, patient had an anxiety attack after her  had left to go home to sleep resulting in tachypnea. The patient was difficult to control and her RR had reached at one point to 52 bpm. SpO2 remained >95%. Patient was started on CPAP around midnight, which reportedly helped a little. However, became more anxious this early morning. CXR and ABG were obtained which revealed interval improvement in lung imaging and respiratory alkalosis, respectively. Patient was switched to BiPAP 18/8. Patient continued to be restless on BiPAP and was given one IV dose of fentanyl.     Review of Systems   Unable to obtain given patient's factors.    Objective:     Vital  Signs (Most Recent):  Temp: 97.9 °F (36.6 °C) (06/03/18 0701)  Pulse: 102 (06/03/18 0701)  Resp: (!) 27 (06/03/18 0701)  BP: (!) 174/81 (06/03/18 0701)  SpO2: 97 % (06/03/18 0701) Vital Signs (24h Range):  Temp:  [97.9 °F (36.6 °C)-98.8 °F (37.1 °C)] 97.9 °F (36.6 °C)  Pulse:  [102-127] 102  Resp:  [25-58] 27  SpO2:  [84 %-100 %] 97 %  BP: (120-174)/() 174/81   Weight: 81.4 kg (179 lb 7.3 oz)  Body mass index is 32.82 kg/m².      Intake/Output Summary (Last 24 hours) at 06/03/18 0753  Last data filed at 06/03/18 0701   Gross per 24 hour   Intake              785 ml   Output              570 ml   Net              215 ml       Physical Exam  General: obese lady with Cushingoid facies. Comfortable on CPAP mask.   Eyes: chemosis improved with lateral conjunctival edema b/l, PERRL. EOMI.   Neck: supple, symmetrical, trachea midline, no JVD.  HENT: AT NC.   Cardiovascular: Heart: tachycardic, regular rhythm, S1, S2 normal, no murmur, click, rub or gallop.  Lungs: slight ronchi heard dispersedly.    Chest Wall: no tenderness.  Abdomen/Rectal: Abdomen: Non distended. No BS. No masses. No TTP.   Extremities: trace edema up to the ankles, no redness or tenderness in the calves or thighs. Pulses: 2+ and symmetric  Skin: Skin color, texture, turgor normal. No rashes or lesions  Musculoskeletal: full range of motion of joints.   Lymph Nodes: No cervical or supraclavicular adenopathy  Psych/Behavioral: Normal. Alert and oriented.    Lines/Drains/Airways     Peripherally Inserted Central Catheter Line                 PICC Double Lumen 05/24/18 right brachial 10 days          Drain                 Urethral Catheter 05/24/18 16 Fr. 10 days              Significant Labs:    CBC/Anemia Profile:    Recent Labs  Lab 06/02/18  0313 06/03/18  0435   WBC 5.61 3.35*   HGB 8.3* 7.7*   HCT 25.7* 24.2*   * 116*   MCV 93 92   RDW 19.2* 19.2*        Chemistries:    Recent Labs  Lab 06/02/18  0313 06/03/18  0435    143   K 4.4  3.7    103   CO2 32* 30*   BUN 16 20   CREATININE 0.6 0.6   CALCIUM 7.9* 7.8*   ALBUMIN 2.1* 2.1*   PROT 4.6* 4.5*   BILITOT 0.6 0.6   ALKPHOS 220* 256*   * 123*   AST 53* 75*   MG 2.1 2.3   PHOS 3.1 2.0*           Significant Imaging:    CXR this am with interval improvement.     Assessment/Plan:     Neuro   Delirium    - likely due to prolonged ICU stay and receiving benzodiazepines (Xanax).  - prn Zyprexa nightly for delirium and agitation.  - delirium precautions. Resolved.         Psychiatric   Generalized anxiety disorder    - zyprexa PRN nightly for agitation and delirium.   - Appreciate psych recommendations. Continue Lexapro 5mg daily.           Ophtho   Conjunctival edema of both eyes    -- likely due to voriconazole currently improving after switching to isavuconazonium.           Pulmonary   * Acute hypoxemic respiratory failure    -- Improved.    -- Likely multifactorial with respiratory secretion, asthma exacerbation, and volume overload all likely interchangeably contributing.   -- CTA on 6/1/2018 negative for PE. Heparin gtt stopped.   -- completed course of abx for possible HAP.   -- decrease Solumedrol to 80mg IV daily with plans on tapering to home prednisone dose of (40mg-60mg) over the next few days with improved respiratory status.  -- duonebs [id number] + 3% NS + cough assist device + use Aerobika with duonebs when appropriate.  -- intubated on 5/28/2018 and bronchoscopy revealing mucus plugging of left main bronchial system. extubated on 5/29/2018 following repeat bronchoscopy.   -- CT chest with contrast on 5/28/2018 revealed no drainable collection at site of former abscess.   -- continue to hold lasix, currently -ve 400ml over lasdt 24 hours and negative 2L since admission.  -- out of bed with PT daily.   -- CPAP at night.        Atelectasis    -- stable  -- continue home vest during the day        Achromobacter pneumonia    -- completed 7 days of Meropenem on 6/1/2018.         Severe persistent asthma    -- Stable  -- On Xolair and prednisone taper at home  -- continue solumedrol 80 mg IV daily.   -- continue home spiriva, breo, and singulair.  -- rest as described in hypoxic resp failure.        Cardiac/Vascular   Mixed hyperlipidemia    -- Continue home pravastatin.         Essential hypertension    -- resume verapamil at 80mg Q8H when stable.        Renal/   Metabolic alkalosis    -- likely due to contraction alkalosis. Continues to improve to HCO3 of 30 with holding diuresis.   -- net +210ml last 24 hours.         ID   ABPA (allergic bronchopulmonary aspergillosis)    -- s/p voriconazole and steroid taper treated at [facility] end of 2017. Complicated by vision loss  -- continue isavuconazonium per ID. Continue steroids.         MRSA bacteremia     -- MRSA grew initially in urine, then blood, and finally in mediastinal collection.   -- SHEILA performed 5/8/2018 was negative for endocarditis.  -- hx of prolonged immunosuppression on Prednisone and Xolair predisposing patient to fungal infection.   -- continue IV Vancomycin for total of 6 weeks per ID beginning 5/25/2018 and follow up in ID clinic with weekly labs.   -- bronchial washing on 5/22/2018 grew MRSA.  -- vanc trough 6/2/2018 was low at 9. Increasing dose to 1250mg bid from 1500mg daily.            Mediastinal collection    Bilateral pleural effusion     -- ill-defined mediastinal collection with mediastinal abscess, loculated pleural effusion, and complex pleural effusion are among the possible differentials.  -- GS and culture during EBUS from bronchial washing at OSH grew MRSA + Achromobacter.   -- had 3-4 FNA passes on EBUS that showed no malignancy. Apparently, no samples were obtained from mediastinal abscess during EBUS at OSH on 5/22/2018.  -- continue broad spectrum abx including vancomycin and Meropenem treating for MRSA bacteremia and achromobacter from BAL on 5/22 and 5/24.    -- repeat CT chest with contrast  on 5/28/2018 here revealed no persistent mediastinal collection amenable to drainage.   -- abscess likely has improved following appropriate abx treatment. No need for surgical/IR intervention at this time.              Hematology   Thrombocytopenia    -- no longer trending down with plt up to 116 from 110 yesterday.  -- HIT panel negative. Heparin gtt held on 6/1/2018 and lovenox ppx for DVT was resumed.           History of DVT of lower extremity    -- hx of DVT in left lower extremity. Completed 3 month course of Eliquis late 2017   -- CTA 6/1/2018 revealed no PE. Heparin gtt held.          Oncology   Hereditary spherocytosis    -- CBC pending.  -- Haptoglobin > 250 at time of anemia re-assuring.        Endocrine   Impaired glucose tolerance    -- likely due to being on steroids chronically.   -- a1c 5.2, good poct glucose measurements earlier in admission.             GI   Abdominal wall fluid collections    -- likely dependent edema > abdo wall hematoma.  -- continue to observe.         Abnormal LFTs    -- likely due to drug adverse effect suspected from voriconazole. Switched to isavuconazonium in setting of conjunctival swelling now with improving LFT's.   -- LFT's stable.         GERD (gastroesophageal reflux disease)    -- Stable  -- Continue famotidine             Critical Care Daily Checklist:    A: Awake: RASS Goal/Actual Goal: RASS Goal: 0-->alert and calm  Actual: Ricks Agitation Sedation Scale (RASS): Drowsy   B: Spontaneous Breathing Trial Performed?     C: SAT & SBT Coordinated?  n/a                      D: Delirium: CAM-ICU Overall CAM-ICU: Negative   E: Early Mobility Performed? yes   F: Feeding Goal: Goals: Meet % EEN, EPN  Status: Nutrition Goal Status: new   Current Diet Order   Procedures    Diet Dysphagia Mechanical Soft (IDDSI Level 5) Thin     Order Specific Question:   Fluid consistency:     Answer:   Thin      AS: Analgesia/Sedation Prn, avoid benzo's    T: Thromboembolic  Prophylaxis lovenox   H: HOB > 300 yes   U: Stress Ulcer Prophylaxis (if needed) pepcid   G: Glucose Control prn   B: Bowel Function Stool Occurrence: 1   I: Indwelling Catheter (Lines & Olivares) Necessity yes   D: De-escalation of Antimicrobials/Pharmacotherapies Yes, continues to be on vancomycin for total of 6 weeks beginning 5/25/2018.     Plan for the day/ETD Continue supportive care.    Code Status:  Family/Goals of Care: Full Code           Hien Pressley MD  Critical Care Medicine  [facility]

## 2018-06-03 NOTE — ASSESSMENT & PLAN NOTE
-- no longer trending down with plt up to 116 from 110 yesterday.  -- HIT panel negative. Heparin gtt held on 6/1/2018 and lovenox ppx for DVT was resumed.

## 2018-06-03 NOTE — ASSESSMENT & PLAN NOTE
-- s/p voriconazole and steroid taper treated at [facility] end of 2017. Complicated by vision loss  -- continue isavuconazonium per ID. Continue steroids.

## 2018-06-03 NOTE — ASSESSMENT & PLAN NOTE
- zyprexa PRN nightly for agitation and delirium.   - Appreciate psych recommendations. Continue Lexapro 5mg daily.

## 2018-06-03 NOTE — CONSULTS
Spoke with pt's family members regarding diet at home per consult request. Pt following strict diet recommended to her at [facility] for allergic bronchopulmonary aspergillosis. Majority of pt's diet at home consists of tuna, avocados, blueberries, and spinach. Will follow up with dietary management to assist in accommodating pt's preferences. RD to follow up as scheduled.

## 2018-06-03 NOTE — ASSESSMENT & PLAN NOTE
-- likely due to being on steroids chronically.   -- a1c 5.2, good poct glucose measurements earlier in admission.

## 2018-06-03 NOTE — NURSING
Patient complaint of more shortness of breath than normally. Sats 97%; tachypenic in the 40-50's. Dr. Adams notified

## 2018-06-04 PROBLEM — Z86.718 HISTORY OF DVT OF LOWER EXTREMITY: Status: RESOLVED | Noted: 2018-01-01 | Resolved: 2018-01-01

## 2018-06-04 PROBLEM — J15.69 ACHROMOBACTER PNEUMONIA: Status: RESOLVED | Noted: 2018-01-01 | Resolved: 2018-01-01

## 2018-06-04 PROBLEM — D69.6 THROMBOCYTOPENIA: Status: RESOLVED | Noted: 2018-01-01 | Resolved: 2018-01-01

## 2018-06-04 PROBLEM — J85.3 MEDIASTINAL ABSCESS: Status: RESOLVED | Noted: 2018-01-01 | Resolved: 2018-01-01

## 2018-06-04 NOTE — ASSESSMENT & PLAN NOTE
-- s/p voriconazole and steroid taper treated at [facility] end of 2017. Complicated by vision loss  -- continue isavuconazonium 372 mg IV qd per ID

## 2018-06-04 NOTE — SUBJECTIVE & OBJECTIVE
Interval History/Significant Events: Overnight patient c/o anxiety.  Otherwise patient is tenuous from respiratory standpoint.  She was lethargic on exam this morning & only minimally interactive but still w/ GCS > 8    Review of Systems   Unable to perform ROS: Mental status change     Objective:     Vital Signs (Most Recent):  Temp: 98.7 °F (37.1 °C) (06/04/18 0701)  Pulse: (!) 128 (06/04/18 0906)  Resp: (!) 39 (06/04/18 0906)  BP: 134/86 (06/04/18 0900)  SpO2: 96 % (06/04/18 0906) Vital Signs (24h Range):  Temp:  [97.9 °F (36.6 °C)-99 °F (37.2 °C)] 98.7 °F (37.1 °C)  Pulse:  [] 128  Resp:  [22-57] 39  SpO2:  [89 %-99 %] 96 %  BP: ()/(57-99) 134/86   Weight: 81.4 kg (179 lb 7.3 oz)  Body mass index is 32.82 kg/m².    Intake/Output Summary (Last 24 hours) at 06/04/18 0959  Last data filed at 06/04/18 0900   Gross per 24 hour   Intake             1025 ml   Output              555 ml   Net              470 ml       Physical Exam   Constitutional: She has a sickly appearance. She appears distressed (Belly breathing).   Chronically ill-appearing woman   HENT:   Head: Normocephalic and atraumatic.   Cardiovascular: Regular rhythm and intact distal pulses.  Tachycardia present.  Exam reveals no gallop and no friction rub.    No murmur heard.  Tachycardic  R. Brachial PICC in place   Pulmonary/Chest: Effort normal. Tachypnea noted. No respiratory distress. She has decreased breath sounds (Diffusely). She has no wheezes (intermittent). She has no rhonchi. She has rales (bilateral).   Abdominal: Soft. Bowel sounds are normal. She exhibits no distension and no mass. There is no tenderness.   Genitourinary:   Genitourinary Comments: Olivares in place   Musculoskeletal: She exhibits edema (1+ nonpitting BL UE & LE edema).   Neurological: She is alert. GCS eye subscore is 4. GCS verbal subscore is 2. GCS motor subscore is 5.       Vents:  Vent Mode: Spont (05/29/18 1820)  Ventilator Initiated: Yes (05/28/18 0806)  Set  Rate: 0 bmp (05/29/18 1820)  Vt Set: 400 mL (05/29/18 1820)  Pressure Support: 5 cmH20 (05/29/18 1820)  PEEP/CPAP: 5 cmH20 (05/29/18 1820)  Oxygen Concentration (%): 40 (06/04/18 0906)  Peak Airway Pressure: 11 cmH2O (05/29/18 1820)  Plateau Pressure: 0 cmH20 (05/29/18 1820)  Total Ve: 5.88 mL (05/29/18 1820)  F/VT Ratio<105 (RSBI): (!) 43.93 (05/29/18 1820)  Lines/Drains/Airways     Peripherally Inserted Central Catheter Line                 PICC Double Lumen 05/24/18 right brachial 11 days          Drain                 Urethral Catheter 05/24/18 16 Fr. 11 days              Significant Labs:    CBC/Anemia Profile:    Recent Labs  Lab 06/03/18  0435 06/04/18  0400   WBC 3.35* 2.94*   HGB 7.7* 7.6*   HCT 24.2* 24.0*   * 119*   MCV 92 93   RDW 19.2* 19.3*        Chemistries:    Recent Labs  Lab 06/03/18  0435 06/04/18  0400    144   K 3.7 3.9    106   CO2 30* 28   BUN 20 21*   CREATININE 0.6 0.6   CALCIUM 7.8* 7.7*   ALBUMIN 2.1* 2.0*   PROT 4.5* 4.5*   BILITOT 0.6 0.5   ALKPHOS 256* 236*   * 103*   AST 75* 57*   MG 2.3 2.3   PHOS 2.0* 2.1*       ABGs:   Recent Labs  Lab 06/04/18  0426   PH 7.558*   PCO2 32.7*   HCO3 29.1*   POCSATURATED 97   BE 7       Significant Imaging:  I have reviewed all pertinent imaging results/findings within the past 24 hours.

## 2018-06-04 NOTE — ASSESSMENT & PLAN NOTE
-- likely due to contraction alkalosis. Improved to 28 today from 30, able to tolerate Lasix 40 mg IV x 1

## 2018-06-04 NOTE — PT/OT/SLP PROGRESS
Occupational Therapy      Patient Name:  Kamla Coulter   MRN:  [id number]    Patient not seen today secondary to on hold due to decreased respiratory status.  Will follow-up as POC allows.    ANNABELLE Mace  6/4/2018

## 2018-06-04 NOTE — PLAN OF CARE
Problem: Patient Care Overview  Goal: Plan of Care Review  Outcome: Ongoing (interventions implemented as appropriate)  NAEON. See flowsheets for vital signs and assessments. See below for updates on progress made.       Neuro: RASS -3 at start of shift, but pt now alert, talking, and following commands    Cardiovascular: ST      Pulmonary: pH 7.558 this AM, pt switched to CPAP    Gastrointestinal: BM x2    Genitourinary: voiding 20-30 mL/hr per mays    Endocrine: K and phos being replaced this AM    Integumentary/other: wound care consult ordered for pressure ulcers and skin tears    Infusions: abx    Patient progressing towards goals as tolerated, plan of care communicated and reviewed with Kamla Coulter and , Kulwant, at bedside. All concerns addressed. Will continue to monitor.

## 2018-06-04 NOTE — PROCEDURES
"Kamla Coulter is a 57 y.o. female patient.    Temp: 98.5 °F (36.9 °C) (18 1100)  Pulse: (!) 112 (18 1406)  Resp: (!) 37 (18 1406)  BP: 135/78 (18 1406)  SpO2: (!) 94 % (18 1406)  Weight: 81.4 kg (179 lb 7.3 oz) (18 1100)  Height: 5' 2" (157.5 cm) (18 1100)       Intubation  Date/Time: 2018 3:36 PM  Performed by: ALFIE KOTHARI  Authorized by: ALFIE KOTHARI   Consent Done: Yes  Consent: Verbal consent obtained.  Risks and benefits: risks, benefits and alternatives were discussed  Consent given by: spouse  Required items: required blood products, implants, devices, and special equipment available  Patient identity confirmed: , MRN and name  Time out: Immediately prior to procedure a "time out" was called to verify the correct patient, procedure, equipment, support staff and site/side marked as required.  Indications: airway protection and  pulmonary toilet  Intubation method: video-assisted  Patient status: unconscious  Sedatives: etomidate  Paralytic: succinylcholine  Laryngoscope size: Mac 4 (Grade I view)  Tube size: 7.5 mm  Tube type: cuffed  Number of attempts: 1  Cricoid pressure: no  Cords visualized: yes  Post-procedure assessment: chest rise and CO2 detector  Breath sounds: clear and equal  Cuff inflated: yes  ETT to teeth: 22 cm  Tube secured with: ETT bonds  Chest x-ray interpreted by: pending.  Patient tolerance: Patient tolerated the procedure well with no immediate complications  Complications: No  Specimens: No  Implants: No          Alfie Kothari  2018  "

## 2018-06-04 NOTE — PROGRESS NOTES
[facility]  Critical Care Medicine  Progress Note    Patient Name: Kamla Coulter  MRN: [id number]  Admission Date: 5/25/2018  Hospital Length of Stay: 10 days  Code Status: Full Code  Attending Provider: Rigo Zuñiga MD  Primary Care Provider: Molina Alexandre MD   Principal Problem: Acute hypoxemic respiratory failure    Subjective:     Hospital/ICU Course:  Admitted as a transfer from [facility] on 5/25/2018 for higher level of care. Patient required BiPAP on admission but was able to wean to HFNC on second day of hospitalization. ID was consulted and had made changes to antimicrobials with continuing vancomycin, switching voriconazole to isavuconazonium and zosyn to meropenem. Blood cultures showed NGTD.  05/28/2018 Worsening respiratory status requiring intubation morning of 5/28/2018 for oxygen saturation in the lower 70's with emergent bronchoscopy done following intubation revealing left system mucus plugging. Following suctioning of mucus plugging oxygen saturation had finally improved. Repeat CT chest did not show mediastinal collection.  05/29/2018 drop in H/H requiring two units of pRBC. Heparin held. Plan for extubation today. Continuing diuresis and management of resp secretions. CT abdomen revealed no intraabdominal source of bleeding. Patient had repeat bronchoscopy and extubated to [facility].  05/30/2018 resuming heparin gtt and weaning oxygen requirement prn. Delirious overnight.   06/01/2018 NG removed by patient overnight. Patient with SLP repeat eval this am. Continue weaning off oxygen.   06/02/2018 CTA negative for PE and heparin held. Speech eval today.   06/03/2018 continue supportive care and weaning oxygen requirement and steroids.   06/04/2018 No acute events overnight.  Patient  C/o increased anxiety overnight and still on CPAP w/ PEEP 5 & FiO2 40%    Interval History/Significant Events: Overnight patient c/o anxiety.  Otherwise patient is tenuous from  respiratory standpoint.  She was lethargic on exam this morning & only minimally interactive but still w/ GCS > 8    Review of Systems   Unable to perform ROS: Mental status change     Objective:     Vital Signs (Most Recent):  Temp: 98.7 °F (37.1 °C) (06/04/18 0701)  Pulse: (!) 128 (06/04/18 0906)  Resp: (!) 39 (06/04/18 0906)  BP: 134/86 (06/04/18 0900)  SpO2: 96 % (06/04/18 0906) Vital Signs (24h Range):  Temp:  [97.9 °F (36.6 °C)-99 °F (37.2 °C)] 98.7 °F (37.1 °C)  Pulse:  [] 128  Resp:  [22-57] 39  SpO2:  [89 %-99 %] 96 %  BP: ()/(57-99) 134/86   Weight: 81.4 kg (179 lb 7.3 oz)  Body mass index is 32.82 kg/m².    Intake/Output Summary (Last 24 hours) at 06/04/18 0959  Last data filed at 06/04/18 0900   Gross per 24 hour   Intake             1025 ml   Output              555 ml   Net              470 ml       Physical Exam   Constitutional: She has a sickly appearance. She appears distressed (Belly breathing).   Chronically ill-appearing woman   HENT:   Head: Normocephalic and atraumatic.   Cardiovascular: Regular rhythm and intact distal pulses.  Tachycardia present.  Exam reveals no gallop and no friction rub.    No murmur heard.  Tachycardic  R. Brachial PICC in place   Pulmonary/Chest: Effort normal. Tachypnea noted. No respiratory distress. She has decreased breath sounds (Diffusely). She has no wheezes (intermittent). She has no rhonchi. She has rales (bilateral).   Abdominal: Soft. Bowel sounds are normal. She exhibits no distension and no mass. There is no tenderness.   Genitourinary:   Genitourinary Comments: Olivares in place   Musculoskeletal: She exhibits edema (1+ nonpitting BL UE & LE edema).   Neurological: She is alert. GCS eye subscore is 4. GCS verbal subscore is 2. GCS motor subscore is 5.       Vents:  Vent Mode: Spont (05/29/18 1820)  Ventilator Initiated: Yes (05/28/18 0806)  Set Rate: 0 bmp (05/29/18 1820)  Vt Set: 400 mL (05/29/18 1820)  Pressure Support: 5 cmH20 (05/29/18  1820)  PEEP/CPAP: 5 cmH20 (05/29/18 1820)  Oxygen Concentration (%): 40 (06/04/18 0906)  Peak Airway Pressure: 11 cmH2O (05/29/18 1820)  Plateau Pressure: 0 cmH20 (05/29/18 1820)  Total Ve: 5.88 mL (05/29/18 1820)  F/VT Ratio<105 (RSBI): (!) 43.93 (05/29/18 1820)  Lines/Drains/Airways     Peripherally Inserted Central Catheter Line                 PICC Double Lumen 05/24/18 right brachial 11 days          Drain                 Urethral Catheter 05/24/18 16 Fr. 11 days              Significant Labs:    CBC/Anemia Profile:    Recent Labs  Lab 06/03/18  0435 06/04/18  0400   WBC 3.35* 2.94*   HGB 7.7* 7.6*   HCT 24.2* 24.0*   * 119*   MCV 92 93   RDW 19.2* 19.3*        Chemistries:    Recent Labs  Lab 06/03/18  0435 06/04/18  0400    144   K 3.7 3.9    106   CO2 30* 28   BUN 20 21*   CREATININE 0.6 0.6   CALCIUM 7.8* 7.7*   ALBUMIN 2.1* 2.0*   PROT 4.5* 4.5*   BILITOT 0.6 0.5   ALKPHOS 256* 236*   * 103*   AST 75* 57*   MG 2.3 2.3   PHOS 2.0* 2.1*       ABGs:   Recent Labs  Lab 06/04/18  0426   PH 7.558*   PCO2 32.7*   HCO3 29.1*   POCSATURATED 97   BE 7       Significant Imaging:  I have reviewed all pertinent imaging results/findings within the past 24 hours.    Assessment/Plan:     Neuro   Delirium    -- likely due to prolonged ICU stay and receiving benzodiazepines (Xanax).  -- prn Zyprexa nightly for delirium and agitation.  -- Control anxiety w/  precedex gtt   -- delirium precautions. Resolved.         Psychiatric   Generalized anxiety disorder    - zyprexa PRN nightly for agitation and delirium.   - Appreciate psych recommendations. Continue Lexapro 5mg daily.           Ophtho   Conjunctival edema of both eyes    -- likely due to voriconazole currently improving after switching to isavuconazonium.        Pulmonary   * Acute hypoxemic respiratory failure    Improved. She was intubated on 5/28/2018 and bronchoscopy revealing mucus plugging of left main bronchial system & extubated on  5/29/2018 following repeat bronchoscopy.  Likely multifactorial with respiratory secretion, asthma exacerbation, and volume overload all likely interchangeably contributing.   Today we changed her over to HFNC and will reassess later this afternoon for worsening function. She may need to go to q4h BiPAP if she is unable to tolerate her HFNC.  -- Completed course of abx for possible HAP.   -- Continue Solumedrol 80mg IV daily, decrease to 60 mg if she tolerate HFNC today  -- duonebs [id number] + 3% NS + cough assist device + use Aerobika with duonebs when appropriate.  -- Lasix 40 mg  IV x 1 & follow up 14:00   -- Out of bed with PT daily.   -- CPAP at night.  -- Repeat ABG at 14:00  -- q4h respiratory checks with low threshold for intubation        Atelectasis    -- stable  -- continue home vest during the day        Severe persistent asthma    -- Stable  -- On Xolair and prednisone taper at home  -- continue solumedrol 80 mg IV daily.   -- continue home spiriva, breo, and singulair.  -- rest as described in hypoxic resp failure.        Cardiac/Vascular   Mixed hyperlipidemia    -- Continue home pravastatin 80 mg qd        Essential hypertension    -- resume verapamil at 80mg Q8H when stable.        Renal/   Metabolic alkalosis    -- likely due to contraction alkalosis. Improved to 28 today from 30, able to tolerate Lasix 40 mg IV x 1        ID   MRSA bacteremia    MRSA grew initially in urine, then blood, and finally in mediastinal collection. SHEILA performed 5/8/2018 was negative for endocarditis.  Hx of prolonged immunosuppression on Prednisone and Xolair predisposing patient to fungal infection. Bronchial washing on 5/22/2018 grew MRSA.  -- continue IV Vancomycin for total of 6 weeks per ID beginning 5/25/2018 and follow up in ID clinic with weekly labs.   -- Continue Vanc 1250 mg IV BID  -- Continue Dapsone 100 mg po qd        ABPA (allergic bronchopulmonary aspergillosis)    -- s/p voriconazole and steroid taper  treated at [facility] end of 2017. Complicated by vision loss  -- continue isavuconazonium 372 mg IV qd per ID        Oncology   Hereditary spherocytosis    -- CBC pending.  -- Haptoglobin > 250 at time of anemia re-assuring.        Endocrine   Impaired glucose tolerance    -- likely due to being on steroids chronically.   -- a1c 5.2, good poct glucose measurements earlier in admission.         GI   Abdominal wall fluid collections    -- Likely dependent edema > abdo wall hematoma  -- Continue to observe        Abnormal LFTs    -- likely due to drug adverse effect suspected from voriconazole. Switched to isavuconazonium in setting of conjunctival swelling now with improving LFT's.   -- LFT's stable.         GERD (gastroesophageal reflux disease)    -- Stable  -- Continue famotidine           Critical Care Daily Checklist:    A: Awake: RASS Goal/Actual Goal: RASS Goal: 0-->alert and calm  Actual: Ricks Agitation Sedation Scale (RASS): Drowsy   B: Spontaneous Breathing Trial Performed?  Pending   C: SAT & SBT Coordinated?  Yes                      D: Delirium: CAM-ICU Overall CAM-ICU: Negative   E: Early Mobility Performed? Yes   F: Feeding Goal: Goals: Meet % EEN, EPN  Status: Nutrition Goal Status: new   Current Diet Order   Procedures    Diet Dysphagia Mechanical Soft (IDDSI Level 5) Thin     Order Specific Question:   Fluid consistency:     Answer:   Thin      AS: Analgesia/Sedation As above   T: Thromboembolic Prophylaxis Lovenox   H: HOB > 300 Yes   U: Stress Ulcer Prophylaxis (if needed) Famotidine   G: Glucose Control Na   B: Bowel Function Stool Occurrence: 0   I: Indwelling Catheter (Lines & Olivares) Necessity As above   D: De-escalation of Antimicrobials/Pharmacotherapies NA    Plan for the day/ETD As above    Code Status:  Family/Goals of Care: Full Code         Critical secondary to Patient has a condition that poses threat to life and bodily function: Severe Respiratory Distress       Critical care was time spent personally by me on the following activities: development of treatment plan with patient or surrogate and bedside caregivers, discussions with consultants, evaluation of patient's response to treatment, examination of patient, ordering and performing treatments and interventions, ordering and review of laboratory studies, ordering and review of radiographic studies, pulse oximetry, re-evaluation of patient's condition. This critical care time did not overlap with that of any other provider or involve time for any procedures.     Isaiah Whitaker MD  Critical Care Medicine  [facility]

## 2018-06-04 NOTE — ASSESSMENT & PLAN NOTE
Improved. She was intubated on 5/28/2018 and bronchoscopy revealing mucus plugging of left main bronchial system & extubated on 5/29/2018 following repeat bronchoscopy.  Likely multifactorial with respiratory secretion, asthma exacerbation, and volume overload all likely interchangeably contributing.   Today we changed her over to HFNC and will reassess later this afternoon for worsening function. She may need to go to q4h BiPAP if she is unable to tolerate her HFNC.  -- Completed course of abx for possible HAP.   -- Continue Solumedrol 80mg IV daily, decrease to 60 mg if she tolerate HFNC today  -- duonebs [id number] + 3% NS + cough assist device + use Aerobika with duonebs when appropriate.  -- Lasix 40 mg  IV x 1 & follow up 14:00   -- Out of bed with PT daily.   -- CPAP at night.  -- Repeat ABG at 14:00  -- q4h respiratory checks with low threshold for intubation

## 2018-06-04 NOTE — ASSESSMENT & PLAN NOTE
-- likely due to prolonged ICU stay and receiving benzodiazepines (Xanax).  -- prn Zyprexa nightly for delirium and agitation.  -- Control anxiety w/  precedex gtt   -- delirium precautions. Resolved.

## 2018-06-04 NOTE — PT/OT/SLP PROGRESS
Physical Therapy      Patient Name:  Kamla Coulter   MRN:  [id number]    Patient not seen today secondary to  (pt on hold 2nd to decreased respiratory status. ). Will follow-up at a later date..    Hanh Forrester, PT

## 2018-06-04 NOTE — PROGRESS NOTES
Patient intubated with size 7.5 ETT and placed on MV on documented settings.  Patient ETT is secure in place with commercial tube bonds and is patent.  ETT is secured 22 @ lips. Will continue to monitor.

## 2018-06-04 NOTE — ASSESSMENT & PLAN NOTE
MRSA grew initially in urine, then blood, and finally in mediastinal collection. SHEILA performed 5/8/2018 was negative for endocarditis.  Hx of prolonged immunosuppression on Prednisone and Xolair predisposing patient to fungal infection. Bronchial washing on 5/22/2018 grew MRSA.  -- continue IV Vancomycin for total of 6 weeks per ID beginning 5/25/2018 and follow up in ID clinic with weekly labs.   -- Continue Vanc 1250 mg IV BID  -- Continue Dapsone 100 mg po qd

## 2018-06-05 PROBLEM — D62 ACUTE BLOOD LOSS ANEMIA: Status: ACTIVE | Noted: 2018-01-01

## 2018-06-05 NOTE — PT/OT/SLP PROGRESS
Occupational Therapy   Treatment    Name: Kamla Coulter  MRN: [id number]  Admitting Diagnosis:  Acute hypoxemic respiratory failure       Recommendations:     Discharge Recommendations: nursing facility, skilled      Subjective     Communicated with: nsg prior to session.  Pain/Comfort:  · Pain Rating 1: 0/10    Patients cultural, spiritual, Restorationism conflicts given the current situation: none reported     Objective:     Patient found supine in bed with vent placed early this AM. Pt had passed MOVE screen, but nsg this date reports pt with increased agitation requiring increased sedation. Nsg did approve pt for ROM this date. .       General Precautions: Standard, contact   Orthopedic Precautions:N/A   Braces:       Occupational Performance:    ROM only completed this date. OT provided PROM/AAROM exs UE/LE all available planes. Increased weeping noted right UE with absorbant pad placed under right UE following ROM. OT provided education to pt/spouse re: purpose of OT at this time.  Pt able to follow one step commands and nodded head appropriately.  VSS throughout session.     Patient left supine with all lines intact and nsg notified   Wrist restraints replaced following session.     [facility] 6 Click:  [facility] Total Score: 6      Assessment:     Kamla Coulter is a 57 y.o. female with a medical diagnosis of Acute hypoxemic respiratory failure. Performance deficits affecting function are weakness, gait instability, impaired functional mobilty, impaired self care skills, impaired endurance.  Pt tolerated ROM well this date. Pt lethargic but did follow commands at times. OT to progress functional mobility/aDL skills when appropriate.     Rehab Prognosis:  Good ; patient would benefit from acute skilled OT services to address these deficits and reach maximum level of function.       Plan:     Patient to be seen 4 x/week to address the above listed problems via self-care/home management, therapeutic exercises,  therapeutic activities  · Plan of Care Expires: 06/27/18  · Plan of Care Reviewed with: patient, spouse    This Plan of care has been discussed with the patient who was involved in its development and understands and is in agreement with the identified goals and treatment plan    GOALS:    Occupational Therapy Goals        Problem: Occupational Therapy Goal    Goal Priority Disciplines Outcome Interventions   Occupational Therapy Goal     OT, PT/OT Ongoing (interventions implemented as appropriate)    Description:  Goals to be met by: 6/13/2018    Pt will complete supine>sit with mod A in prep for seated grooming task.  Pt will sit at EOB x10 minutes with min A for balance to wipe face with washcloth.  Pt will complete scooting along EOB with min A in prep for scoot pivot t/f to BSC.  Pt will complete sit>stand with mod A in prep for toilet transfer.  Pt will complete UB Dressing with min A                     Time Tracking:     OT Date of Treatment: 06/05/18  OT Start Time: 1200  OT Stop Time: 1225  OT Total Time (min): 25 min    Billable Minutes:Therapeutic Exercise 25    ANNABELLE Burns  6/5/2018

## 2018-06-05 NOTE — SUBJECTIVE & OBJECTIVE
Interval History/Significant Events: Patient intubated yesterday for increased WOB w/o any immediate complications.  Overnight patient had maroon BM's via rectal tube w/ Hgb this am of 6.4 after slowly trending down over the last 4 days.  Leophed weaned off overnight.  This morning patient appears anxious - propofol increased for anxiety.    Review of Systems   Unable to perform ROS: Intubated     Objective:     Vital Signs (Most Recent):  Temp: 98.6 °F (37 °C) (06/05/18 0718)  Pulse: 109 (06/05/18 1000)  Resp: 20 (06/05/18 1000)  BP: (!) 154/63 (06/05/18 1000)  SpO2: 96 % (06/05/18 1000) Vital Signs (24h Range):  Temp:  [98.5 °F (36.9 °C)-99.9 °F (37.7 °C)] 98.6 °F (37 °C)  Pulse:  [] 109  Resp:  [15-37] 20  SpO2:  [89 %-100 %] 96 %  BP: ()/(52-89) 154/63   Weight: 81.4 kg (179 lb 7.3 oz)  Body mass index is 32.82 kg/m².      Intake/Output Summary (Last 24 hours) at 06/05/18 1054  Last data filed at 06/05/18 1047   Gross per 24 hour   Intake          2292.66 ml   Output             1365 ml   Net           927.66 ml       Physical Exam   Constitutional: She appears ill. She appears distressed. She is intubated.   Cardiovascular: Regular rhythm and intact distal pulses.  Tachycardia present.  Exam reveals no gallop and no friction rub.    No murmur heard.  Pulmonary/Chest: She is intubated. No respiratory distress. She has decreased breath sounds (BL lower lung fields). She has no wheezes. She has rhonchi (BL diffuse). She has no rales.   Abdominal: Soft. Bowel sounds are normal. She exhibits no distension. There is no tenderness.   Musculoskeletal: She exhibits edema (No signifcant change in edema from yesterday).   Neurological: She is alert.   [id number]   Psychiatric: Her mood appears anxious.       Vents:  Vent Mode: A/C (06/05/18 0924)  Ventilator Initiated: Yes (05/28/18 0806)  Set Rate: 16 bmp (06/05/18 0924)  Vt Set: 350 mL (06/05/18 0924)  Pressure Support: 0 cmH20 (06/05/18 0924)  PEEP/CPAP: 5  cmH20 (06/05/18 0924)  Oxygen Concentration (%): 40 (06/05/18 1000)  Peak Airway Pressure: 28 cmH2O (06/05/18 0924)  Plateau Pressure: 19 cmH20 (06/05/18 0924)  Total Ve: 7.52 mL (06/05/18 0924)  F/VT Ratio<105 (RSBI): (!) 65.75 (06/05/18 0924)  Lines/Drains/Airways     Peripherally Inserted Central Catheter Line                 PICC Double Lumen 05/24/18 right brachial 12 days          Drain                 Urethral Catheter 05/24/18 16 Fr. 12 days         NG/OG Tube 06/04/18 1540 orogastric 14 Fr. Right mouth less than 1 day          Airway                 Airway - Non-Surgical 06/04/18 1534 Endotracheal Tube less than 1 day              Significant Labs:    CBC/Anemia Profile:    Recent Labs  Lab 06/04/18  0400 06/05/18  0306   WBC 2.94* 2.48*   HGB 7.6* 6.4*   HCT 24.0* 20.0*   * 113*   MCV 93 93   RDW 19.3* 19.3*        Chemistries:    Recent Labs  Lab 06/04/18  0400 06/05/18  0306    144   K 3.9 3.3*    105   CO2 28 27   BUN 21* 18   CREATININE 0.6 0.6   CALCIUM 7.7* 7.3*   ALBUMIN 2.0* 1.8*   PROT 4.5* 4.3*   BILITOT 0.5 0.4   ALKPHOS 236* 201*   * 83*   AST 57* 51*   MG 2.3 1.8   PHOS 2.1* 2.2*       ABGs:   Recent Labs  Lab 06/05/18  0359   PH 7.531*   PCO2 33.8*   HCO3 28.3*   POCSATURATED 96   BE 6       Significant Imaging:  CXR: I have reviewed all pertinent results/findings within the past 24 hours and my personal findings are:  No significant interval change from 6/4

## 2018-06-05 NOTE — ASSESSMENT & PLAN NOTE
56 yo F with severe asthma and now ABPA currently intubated with one episode of hematochezia. With her respiratory status and no further bloody bowel movements, would favor watching and waiting.    Recommendations:  - Continue supportive care per ICU  - PPI IV BID  - Transfuse to keep Hg>7

## 2018-06-05 NOTE — PROGRESS NOTES
ETT withdrawn 1 cm from 22@ the lip to 21@ the lip per Dr. Zuñiga verbal order.  Will pass on during report.   Will continue to monitor.

## 2018-06-05 NOTE — SUBJECTIVE & OBJECTIVE
"Past Medical History:   Diagnosis Date    ABPA (allergic bronchopulmonary aspergillosis)     Allergy     Anxiety     Arachnoid cyst     monitoring    Asthma     COPD (chronic obstructive pulmonary disease)     Hemoptysis     Hypertension     Tachycardia     Thalamic disease     thalamia minor       Past Surgical History:   Procedure Laterality Date    BREAST SURGERY      breast bx     BRONCHOSCOPY  10/2016    [place]     SECTION      CHOLECYSTECTOMY      COLONOSCOPY N/A 2017    Procedure: COLONOSCOPY;  Surgeon: Horacio Pelaez MD;  Location: [facility];  Service: General;  Laterality: N/A;    DENTAL SURGERY      GALLBLADDER SURGERY      HERNIA REPAIR      INTRAUTERINE DEVICE INSERTION      SINUS SURGERY         Review of patient's allergies indicates:   Allergen Reactions    Neosporin (neomycin-polymyx) Rash     "I get a skin rash"    Sulfa (sulfonamide antibiotics) Anaphylaxis    Bactrim [sulfamethoxazole-trimethoprim]     Venlafaxine analogues     Pollen extracts Other (See Comments)     "allergic rhinitis mess"    Vfend [voriconazole] Other (See Comments)     Vision loss     Family History     Problem Relation (Age of Onset)    Atrial fibrillation Mother    Diabetes Mother    Heart disease Father    Hyperlipidemia Mother, Father    Hypertension Mother, Father, Sister        Social History Main Topics    Smoking status: Never Smoker    Smokeless tobacco: Never Used    Alcohol use No      Comment: social occ    Drug use: No    Sexual activity: Yes     Partners: Male     Review of Systems   Unable to perform ROS: Intubated     Objective:     Vital Signs (Most Recent):  Temp: 98.4 °F (36.9 °C) (18 1200)  Pulse: 93 (18 1509)  Resp: 18 (18 1509)  BP: (!) 109/58 (18 1400)  SpO2: 97 % (18 1509) Vital Signs (24h Range):  Temp:  [98.4 °F (36.9 °C)-99.9 °F (37.7 °C)] 98.4 °F (36.9 °C)  Pulse:  [] 93  Resp:  [15-29] 18  SpO2:  [89 %-99 %] 97 %  BP: " ()/(53-84) 109/58     Weight: 81.4 kg (179 lb 7.3 oz) (05/31/18 1100)  Body mass index is 32.82 kg/m².      Intake/Output Summary (Last 24 hours) at 06/05/18 1656  Last data filed at 06/05/18 1403   Gross per 24 hour   Intake          2536.06 ml   Output              495 ml   Net          2041.06 ml       Lines/Drains/Airways     Peripherally Inserted Central Catheter Line                 PICC Double Lumen 05/24/18 right brachial 12 days          Drain                 Urethral Catheter 05/24/18 16 Fr. 12 days         NG/OG Tube 06/04/18 1540 orogastric 14 Fr. Right mouth 1 day          Airway                 Airway - Non-Surgical 06/04/18 1534 Endotracheal Tube 1 day                Physical Exam   Constitutional: She is oriented to person, place, and time.   Intubated, sedated   HENT:   +OG tube   Eyes: No scleral icterus.   Cardiovascular: Regular rhythm and normal heart sounds.    Pulmonary/Chest: Effort normal and breath sounds normal.   Abdominal: Soft. She exhibits no distension. There is no guarding.   Musculoskeletal: She exhibits no edema or deformity.   Neurological: She is alert and oriented to person, place, and time.   Skin: Skin is warm and dry.   Vitals reviewed.      Significant Labs:  CBC:   Recent Labs  Lab 06/04/18  0400 06/05/18  0306   WBC 2.94* 2.48*   HGB 7.6* 6.4*   HCT 24.0* 20.0*   * 113*     CMP:   Recent Labs  Lab 06/05/18  0306 06/05/18  1510   *  --    CALCIUM 7.3*  --    ALBUMIN 1.8*  --    PROT 4.3* 4.9*     --    K 3.3*  --    CO2 27  --      --    BUN 18  --    CREATININE 0.6  --    ALKPHOS 201*  --    ALT 83*  --    AST 51*  --    BILITOT 0.4  --      Coagulation: No results for input(s): PT, INR, APTT in the last 48 hours.

## 2018-06-05 NOTE — ASSESSMENT & PLAN NOTE
MRSA grew initially in urine, then blood, and finally in mediastinal collection. SHEILA performed 5/8/2018 was negative for endocarditis.  Hx of prolonged immunosuppression on Prednisone and Xolair predisposing patient to fungal infection. Bronchial washing on 5/22/2018 grew MRSA.  -- continue IV Vancomycin for total of 6 weeks per ID beginning 5/25/2018 and follow up in ID clinic with weekly labs.   -- Change Vanc dose from 1250 mg to 1750 mg IV BID  -- Continue Dapsone 100 mg po qd

## 2018-06-05 NOTE — PLAN OF CARE
Patient re-intubated 6/4/18.  Plan to continue supportive care and vent weaning.  ID following, plan to continue antibiotics.  Patient requiring on/off BP support with Levo infusion.  PT/OT to re-evaluate once medically appropriate.  CM will continue to follow.     06/05/18 1047   Right Care Assessment   Can the patient answer the patient profile reliably? No, cognitively impaired   How often would a person be available to care for the patient? Whenever needed   Describe the patient's ability to walk at the present time. Does not walk or unable to take any steps at all   How does the patient rate their overall health at the present time? (TELLO)   Number of comorbid conditions (as recorded on the chart) None   During the past month, has the patient often been bothered by feeling down, depressed or hopeless? (TELLO)   During the past month, has the patient often been bothered by little interest or pleasure in doing things? (TELLO)   Xuan Waldrop, RN, BSN, CCM  Case Management  [facility]  Ext. [phone]

## 2018-06-05 NOTE — ASSESSMENT & PLAN NOTE
Improved. She was intubated on 5/28/2018 and bronchoscopy revealing mucus plugging of left main bronchial system & extubated on 5/29/2018 following repeat bronchoscopy.  Likely multifactorial with respiratory secretion, asthma exacerbation, and volume overload all likely interchangeably contributing.   Yesterday she did not tolerate HFNC or CPAP and had to be intubated for increased WOB.  Suspect she may have another mucinous plug with will likely need to repeat bronchoscopy later today  -- Completed course of abx for possible HAP.   -- Continue Solumedrol 80mg IV daily  -- duonebs [id number] + 3% NS + cough assist device + use Aerobika with duonebs when appropriate.  -- Repeat bronchoscopy later  today  -- Holding giving another dose of Lasix today   -- Continue propofol & fentanyl gtt and wean down today as tolerated

## 2018-06-05 NOTE — PLAN OF CARE
Problem: Occupational Therapy Goal  Goal: Occupational Therapy Goal  Goals to be met by: 6/13/2018    Pt will complete supine>sit with mod A in prep for seated grooming task.  Pt will sit at EOB x10 minutes with min A for balance to wipe face with washcloth.  Pt will complete scooting along EOB with min A in prep for scoot pivot t/f to BSC.  Pt will complete sit>stand with mod A in prep for toilet transfer.  Pt will complete UB Dressing with min A    Goals remain appropriate.

## 2018-06-05 NOTE — PROCEDURES
"Kamla Coulter is a 57 y.o. female patient.    Temp: 98.4 °F (36.9 °C) (06/05/18 1200)  Pulse: 109 (06/05/18 1300)  Resp: (!) 21 (06/05/18 1300)  BP: (!) 100/57 (06/05/18 1300)  SpO2: (!) 92 % (06/05/18 1300)  Weight: 81.4 kg (179 lb 7.3 oz) (05/31/18 1100)  Height: 5' 2" (157.5 cm) (05/31/18 1100)       Procedures    Kan Ruiz  6/5/2018     [facility]  Bronchoscopy   Procedure Note    SUMMARY     Date of Procedure: 6/5/2018    Procedure: Bronchoscopy, Diagnostic  Bronchoscopy, Therapeutic  Bronchoalveolar lavage, BAL    Provider: Kan Ruiz MD    Assisting Provider: --    Pre-Procedure Diagnosis: Mucus Plug    Post-Procedure Diagnosis: Relatively clear of mucus plugs    Indication: Re-intubation    Anesthesia: PRN fentanyl push    Technical Procedures Used: bedside bronchoscopy    Description of the Findings of the Procedure:     Patient was consented for the procedure with all risk and benefit of the procedure explained in detail.  Patient was given the opportunity to ask questions and all concerns were answered.  The bronchocope was inserted into the main airway via the endotracheal tube. An anatomical survey was done of the main airways and the subsegmental bronchus.  The findings are reported above.  A bronchialalveolar lavage was performed using aliquots of normal saline instilled into the airways then aspirated back.    Significant Surgical Tasks Conducted by the Assistant(s), if Applicable: --    Complications: None; patient tolerated the procedure well.    Estimated Blood Loss (EBL): Minimal           Implants: --    Specimens: Sent serosanguinous fluid       Condition: Good        Disposition: ICU - intubated and critically ill.    Attestation: I performed the procedure.     García Ruiz MD  Fellow, [facility]  Cell [phone]      "

## 2018-06-05 NOTE — PT/OT/SLP PROGRESS
Speech Language Pathology  Discharge Summary      Kamla Coulter  MRN: [id number]    Patient not seen today secondary to Other (Comment). Patient intubated.  ST to d/c therapy at this time. Please re consult when appropriate. Thank you.      LEONEL Pierson., [facility]-SLP  Speech-Language Pathology  Pager: [phone]  6/5/2018

## 2018-06-05 NOTE — HPI
This is a 56 yo F with severe persistent asthma now with ABPA, HTN, GERD, DVT who was transferred from OSH on 5/25 for higher level of care. She has been intubated and extubated a few times and now intubated for respiratory distress. GI is being consulted for one episode of hematochezia yesterday. Patient has no history of GI bleed per . She did undergo an EGD and colonoscopy earlier this year which were normal per him. Patient currently intubated and sedated. She has had no further bloody bowel movements. No coffee ground emesis or blood per OG tube. Her blood counts have been downtrending but hemodynamically stable.

## 2018-06-05 NOTE — PHYSICIAN QUERY
PT Name: Kamla Coulter  MR #: [id number]    Physician Query Form - Cause and Effect Relationship Clarification      Rudolph Boland RN CDS    Contact Information: [phone]    This form is a permanent document in the medical record.     Query Date: June 5, 2018    By submitting this query, we are merely seeking further clarification of documentation. Please utilize your independent clinical judgment when addressing the question(s) below.    The Medical record contains the following:  Supporting Clinical Findings   Location in record     Sepsis   MRSA bacteremia with mediastinal fluid collection                                                                                                                      [ X ] Sepsis                                                                      5/29 Critical Care Medicine PN          5/29 Other Documentation      Methicillin Resistant Staphylococcus aureus   Rare                                                                                                                                                                                            5/28 Culture Respiratory, w/Gram           Provider, please clarify if there is any correlation between ___Sepsis_ and __Methicillin Resistant Staphylococcus aureus ___.           Are the conditions:     [  ] Due to or associated with each other     [ x ] Unrelated to each other     [  ] Other (Please Specify): _________________________     [  ] Clinically Undetermined

## 2018-06-05 NOTE — CONSULTS
[facility]  Gastroenterology  Consult Note    Patient Name: Kamla Coulter  MRN: [id number]  Admission Date: 2018  Hospital Length of Stay: 11 days  Code Status: Full Code   Attending Provider: Rigo Zuñiga MD   Consulting Provider: Julien Palmer MD  Primary Care Physician: Molina Alexandre MD  Principal Problem:Acute hypoxemic respiratory failure    Inpatient consult to Gastroenterology  Consult performed by: JULIEN PALMER  Consult ordered by: KARRI VILLARREAL AUGUST        Subjective:     HPI:  This is a 58 yo F with severe persistent asthma now with ABPA, HTN, GERD, DVT who was transferred from [facility] on  for higher level of care. She has been intubated and extubated a few times and now intubated for respiratory distress. GI is being consulted for one episode of hematochezia yesterday. Patient has no history of GI bleed per . She did undergo an EGD and colonoscopy earlier this year which were normal per him. Patient currently intubated and sedated. She has had no further bloody bowel movements. No coffee ground emesis or blood per OG tube. Her blood counts have been downtrending but hemodynamically stable.    Past Medical History:   Diagnosis Date    ABPA (allergic bronchopulmonary aspergillosis)     Allergy     Anxiety     Arachnoid cyst     monitoring    Asthma     COPD (chronic obstructive pulmonary disease)     Hemoptysis     Hypertension     Tachycardia     Thalamic disease     thalamia minor       Past Surgical History:   Procedure Laterality Date    BREAST SURGERY      breast bx     [place]  10/2016    [place]     SECTION      CHOLECYSTECTOMY      COLONOSCOPY N/A 2017    Procedure: COLONOSCOPY;  Surgeon: Horacio Pelaez MD;  [facility];  Service: General;  Laterality: N/A;    DENTAL SURGERY      GALLBLADDER SURGERY      HERNIA REPAIR      INTRAUTERINE DEVICE INSERTION      SINUS SURGERY         Review of patient's  "allergies indicates:   Allergen Reactions    Neosporin (neomycin-polymyx) Rash     "I get a skin rash"    Sulfa (sulfonamide antibiotics) Anaphylaxis    Bactrim [sulfamethoxazole-trimethoprim]     Venlafaxine analogues     Pollen extracts Other (See Comments)     "allergic rhinitis mess"    Vfend [voriconazole] Other (See Comments)     Vision loss     Family History     Problem Relation (Age of Onset)    Atrial fibrillation Mother    Diabetes Mother    Heart disease Father    Hyperlipidemia Mother, Father    Hypertension Mother, Father, Sister        Social History Main Topics    Smoking status: Never Smoker    Smokeless tobacco: Never Used    Alcohol use No      Comment: social occ    Drug use: No    Sexual activity: Yes     Partners: Male     Review of Systems   Unable to perform ROS: Intubated     Objective:     Vital Signs (Most Recent):  Temp: 98.4 °F (36.9 °C) (06/05/18 1200)  Pulse: 93 (06/05/18 1509)  Resp: 18 (06/05/18 1509)  BP: (!) 109/58 (06/05/18 1400)  SpO2: 97 % (06/05/18 1509) Vital Signs (24h Range):  Temp:  [98.4 °F (36.9 °C)-99.9 °F (37.7 °C)] 98.4 °F (36.9 °C)  Pulse:  [] 93  Resp:  [15-29] 18  SpO2:  [89 %-99 %] 97 %  BP: ()/(53-84) 109/58     Weight: 81.4 kg (179 lb 7.3 oz) (05/31/18 1100)  Body mass index is 32.82 kg/m².      Intake/Output Summary (Last 24 hours) at 06/05/18 1656  Last data filed at 06/05/18 1403   Gross per 24 hour   Intake          2536.06 ml   Output              495 ml   Net          2041.06 ml       Lines/Drains/Airways     Peripherally Inserted Central Catheter Line                 PICC Double Lumen 05/24/18 right brachial 12 days          Drain                 Urethral Catheter 05/24/18 16 Fr. 12 days         NG/OG Tube 06/04/18 1540 orogastric 14 Fr. Right mouth 1 day          Airway                 Airway - Non-Surgical 06/04/18 1534 Endotracheal Tube 1 day                Physical Exam   Constitutional: She is oriented to person, place, and " time.   Intubated, sedated   HENT:   +OG tube   Eyes: No scleral icterus.   Cardiovascular: Regular rhythm and normal heart sounds.    Pulmonary/Chest: Effort normal and breath sounds normal.   Abdominal: Soft. She exhibits no distension. There is no guarding.   Musculoskeletal: She exhibits no edema or deformity.   Neurological: She is alert and oriented to person, place, and time.   Skin: Skin is warm and dry.   Vitals reviewed.      Significant Labs:  CBC:   Recent Labs  Lab 06/04/18  0400 06/05/18  0306   WBC 2.94* 2.48*   HGB 7.6* 6.4*   HCT 24.0* 20.0*   * 113*     CMP:   Recent Labs  Lab 06/05/18  0306 06/05/18  1510   *  --    CALCIUM 7.3*  --    ALBUMIN 1.8*  --    PROT 4.3* 4.9*     --    K 3.3*  --    CO2 27  --      --    BUN 18  --    CREATININE 0.6  --    ALKPHOS 201*  --    ALT 83*  --    AST 51*  --    BILITOT 0.4  --      Coagulation: No results for input(s): PT, INR, APTT in the last 48 hours.      Assessment/Plan:     Acute blood loss anemia    58 yo F with severe asthma and now ABPA currently intubated with one episode of hematochezia. With her respiratory status and no further bloody bowel movements, would favor watching and waiting.    Recommendations:  - Continue supportive care per ICU  - PPI IV BID  - Transfuse to keep Hg>7            Thank you for your consult. I will follow-up with patient. Please contact us if you have any additional questions.    Julien Ruelas MD  Gastroenterology  [facility]

## 2018-06-05 NOTE — PROCEDURES
"Kamla Coulter is a 57 y.o. female patient.    Temp: 98.4 °F (36.9 °C) (18 1200)  Pulse: 100 (18 1400)  Resp: 16 (18 1400)  BP: (!) 109/58 (18 1400)  SpO2: (!) 94 % (18 1400)  Weight: 81.4 kg (179 lb 7.3 oz) (18 1100)  Height: 5' 2" (157.5 cm) (18 1100)       Thoracentesis  Date/Time: 2018 3:08 PM  Performed by: TRISH MENDOZA  Authorized by: TRISH MENDOZA   Consent Done: Yes  Consent: Written consent obtained.  Consent given by: spouse  Patient understanding: patient states understanding of the procedure being performed  Patient consent: the patient's understanding of the procedure matches consent given  Procedure consent: procedure consent matches procedure scheduled  Relevant documents: relevant documents present and verified  Test results: test results available and properly labeled  Site marked: the operative site was marked  Imaging studies: imaging studies available  Required items: required blood products, implants, devices, and special equipment available  Patient identity confirmed: , MRN and name  Time out: Immediately prior to procedure a "time out" was called to verify the correct patient, procedure, equipment, support staff and site/side marked as required.  Procedure purpose: therapeutic and diagnostic  Indications: pleural effusion  Preparation: Patient was prepped and draped in the usual sterile fashion.  Local anesthesia used: no    Anesthesia:  Local anesthesia used: no  Preparation: skin prepped with ChloraPrep  Patient position: supine  Ultrasound guidance: yes  Location: right lateral  Intercostal space: 6th  Puncture method: over-the-needle catheter  Needle size: 16  Catheter size: 16 gauge  Number of attempts: 1  Drainage amount: 700 ml  Drainage characteristics: serous  Patient tolerance: Patient tolerated the procedure well with no immediate complications  Chest x-ray performed: yes  Complications: [place] JOHNSON" Joseph  6/5/2018

## 2018-06-05 NOTE — ASSESSMENT & PLAN NOTE
-- likely due to prolonged ICU stay and receiving benzodiazepines (Xanax).  -- prn Zyprexa nightly for delirium and agitation.  -- Change from precedex gtt to propofol ggtt  -- delirium precautions. Resolved.

## 2018-06-05 NOTE — ASSESSMENT & PLAN NOTE
-- Hgb has been slowly trending down over the last several days  -- Holding all AC & Gi consulted for possible EGD

## 2018-06-05 NOTE — PLAN OF CARE
Problem: Patient Care Overview  Goal: Plan of Care Review  Outcome: Ongoing (interventions implemented as appropriate)  H&H 6.4/20, was 7.6/24. Pt had small, dark red BM this AM. MD ordered to transfuse 1 unit RBCs, TF held in case procedure needs to be done. See flowsheets for vital signs and assessments. See below for updates on progress made.       Neuro: easily arouses to voices, follows commands, nods appropriately    Cardiovascular: ST; levo gtt off since 2315      Pulmonary: vent: AC, 14, 350, 5, 40%    Gastrointestinal: OGT, TF started after pt's bath, held at 0530; BM x2, flexi placed d/t significant excoriation and breakdown    Genitourinary: voided 250 per mays    Endocrine: K 3.3, replaced with 60 mEq IVPB; mag 1.8, replaced with 2g IVPB; phos 2.2, to be replaced with 20.01 mmol IVPB    Integumentary/other: wound care cx ordered 6/3 still in place for skin breakdown; no mepilex to pts sacrum/gluteaus d/t her skin tearing more when dsg removed    Infusions: propofol at 25    POC: wound care cx, transfuse 1 unit RBCs    Patient progressing towards goals as tolerated, plan of care communicated and reviewed with Kamla Coutler and , Kulwant, at bedside. All concerns addressed. Will continue to monitor.

## 2018-06-05 NOTE — PROGRESS NOTES
[facility]  Critical Care Medicine  Progress Note    Patient Name: Kamla Coulter  MRN: [id number]  Admission Date: 5/25/2018  Hospital Length of Stay: 11 days  Code Status: Full Code  Attending Provider: Rigo Zuñiga MD  Primary Care Provider: Molina Alexandre MD   Principal Problem: Acute hypoxemic respiratory failure    Subjective:     Hospital/ICU Course:  Admitted as a transfer from [facility] on 5/25/2018 for higher level of care. Patient required BiPAP on admission but was able to wean to HFNC on second day of hospitalization. ID was consulted and had made changes to antimicrobials with continuing vancomycin, switching voriconazole to isavuconazonium and zosyn to meropenem. Blood cultures showed NGTD.  05/28/2018 Worsening respiratory status requiring intubation morning of 5/28/2018 for oxygen saturation in the lower 70's with emergent bronchoscopy done following intubation revealing left system mucus plugging. Following suctioning of mucus plugging oxygen saturation had finally improved. Repeat CT chest did not show mediastinal collection.    05/29/2018 drop in H/H requiring two units of pRBC. Heparin held. Plan for extubation today. Continuing diuresis and management of resp secretions. CT abdomen revealed no intraabdominal source of bleeding. Patient had repeat bronchoscopy and extubated to [facility].    05/30/2018 resuming heparin gtt and weaning oxygen requirement prn. Delirious overnight.       06/01/2018 NG removed by patient overnight. Patient with SLP repeat eval this am. Continue weaning off oxygen.     06/02/2018 CTA negative for PE and heparin held. Speech eval today.     06/03/2018 continue supportive care and weaning oxygen requirement and steroids.     06/04/2018 No acute events overnight.  Patient  C/o increased anxiety overnight and still on CPAP w/ PEEP 5 & FiO2 40%    Interval History/Significant Events: Patient intubated yesterday for increased WOB w/o any  immediate complications.  Overnight patient had maroon BM's via rectal tube w/ Hgb this am of 6.4 after slowly trending down over the last 4 days.  Leophed weaned off overnight.  This morning patient appears anxious - propofol increased for anxiety.    Review of Systems   Unable to perform ROS: Intubated     Objective:     Vital Signs (Most Recent):  Temp: 98.6 °F (37 °C) (06/05/18 0718)  Pulse: 109 (06/05/18 1000)  Resp: 20 (06/05/18 1000)  BP: (!) 154/63 (06/05/18 1000)  SpO2: 96 % (06/05/18 1000) Vital Signs (24h Range):  Temp:  [98.5 °F (36.9 °C)-99.9 °F (37.7 °C)] 98.6 °F (37 °C)  Pulse:  [] 109  Resp:  [15-37] 20  SpO2:  [89 %-100 %] 96 %  BP: ()/(52-89) 154/63   Weight: 81.4 kg (179 lb 7.3 oz)  Body mass index is 32.82 kg/m².      Intake/Output Summary (Last 24 hours) at 06/05/18 1054  Last data filed at 06/05/18 1047   Gross per 24 hour   Intake          2292.66 ml   Output             1365 ml   Net           927.66 ml       Physical Exam   Constitutional: She appears ill. She appears distressed. She is intubated.   Cardiovascular: Regular rhythm and intact distal pulses.  Tachycardia present.  Exam reveals no gallop and no friction rub.    No murmur heard.  Pulmonary/Chest: She is intubated. No respiratory distress. She has decreased breath sounds (BL lower lung fields). She has no wheezes. She has rhonchi (BL diffuse). She has no rales.   Abdominal: Soft. Bowel sounds are normal. She exhibits no distension. There is no tenderness.   Musculoskeletal: She exhibits edema (No signifcant change in edema from yesterday).   Neurological: She is alert.   [id number]   Psychiatric: Her mood appears anxious.       Vents:  Vent Mode: A/C (06/05/18 0924)  Ventilator Initiated: Yes (05/28/18 0806)  Set Rate: 16 bmp (06/05/18 0924)  Vt Set: 350 mL (06/05/18 0924)  Pressure Support: 0 cmH20 (06/05/18 0924)  PEEP/CPAP: 5 cmH20 (06/05/18 0924)  Oxygen Concentration (%): 40 (06/05/18 1000)  Peak Airway Pressure: 28  cmH2O (06/05/18 0924)  Plateau Pressure: 19 cmH20 (06/05/18 0924)  Total Ve: 7.52 mL (06/05/18 0924)  F/VT Ratio<105 (RSBI): (!) 65.75 (06/05/18 0924)  Lines/Drains/Airways     Peripherally Inserted Central Catheter Line                 PICC Double Lumen 05/24/18 right brachial 12 days          Drain                 Urethral Catheter 05/24/18 16 Fr. 12 days         NG/OG Tube 06/04/18 1540 orogastric 14 Fr. Right mouth less than 1 day          Airway                 Airway - Non-Surgical 06/04/18 1534 Endotracheal Tube less than 1 day              Significant Labs:    CBC/Anemia Profile:    Recent Labs  Lab 06/04/18  0400 06/05/18  0306   WBC 2.94* 2.48*   HGB 7.6* 6.4*   HCT 24.0* 20.0*   * 113*   MCV 93 93   RDW 19.3* 19.3*        Chemistries:    Recent Labs  Lab 06/04/18  0400 06/05/18  0306    144   K 3.9 3.3*    105   CO2 28 27   BUN 21* 18   CREATININE 0.6 0.6   CALCIUM 7.7* 7.3*   ALBUMIN 2.0* 1.8*   PROT 4.5* 4.3*   BILITOT 0.5 0.4   ALKPHOS 236* 201*   * 83*   AST 57* 51*   MG 2.3 1.8   PHOS 2.1* 2.2*       ABGs:   Recent Labs  Lab 06/05/18  0359   PH 7.531*   PCO2 33.8*   HCO3 28.3*   POCSATURATED 96   BE 6       Significant Imaging:  CXR: I have reviewed all pertinent results/findings within the past 24 hours and my personal findings are:  No significant interval change from 6/4    Assessment/Plan:     Neuro   Delirium    -- likely due to prolonged ICU stay and receiving benzodiazepines (Xanax).  -- prn Zyprexa nightly for delirium and agitation.  -- Change from precedex gtt to propofol ggtt  -- delirium precautions. Resolved.         Psychiatric   Generalized anxiety disorder    - zyprexa PRN nightly for agitation and delirium.   - Appreciate psych recommendations. Continue Lexapro 5mg daily.         Ophtho   Conjunctival edema of both eyes    -- likely due to voriconazole currently improving after switching to isavuconazonium.        Pulmonary   * Acute hypoxemic respiratory  failure    Improved. She was intubated on 5/28/2018 and bronchoscopy revealing mucus plugging of left main bronchial system & extubated on 5/29/2018 following repeat bronchoscopy.  Likely multifactorial with respiratory secretion, asthma exacerbation, and volume overload all likely interchangeably contributing.   Yesterday she did not tolerate HFNC or CPAP and had to be intubated for increased WOB.  Suspect she may have another mucinous plug with will likely need to repeat bronchoscopy later today  -- Completed course of abx for possible HAP.   -- Continue Solumedrol 80mg IV daily  -- duonebs [id number] + 3% NS + cough assist device + use Aerobika with duonebs when appropriate.  -- Repeat bronchoscopy later  today  -- Holding giving another dose of Lasix today   -- Continue propofol & fentanyl gtt and wean down today as tolerated        Atelectasis    -- stable  -- continue home vest during the day        Severe persistent asthma    -- Stable  -- On Xolair and prednisone taper at home  -- continue solumedrol 80 mg IV daily.   -- continue home spiriva, breo, and singulair.  -- rest as described in hypoxic resp failure.        Cardiac/Vascular   Mixed hyperlipidemia    -- Continue home pravastatin 80 mg qd        Essential hypertension    -- Continue holding verapamil at 80mg Q8H        Renal/   Metabolic alkalosis    -- Was likely 2/2 to volume contraction alkalosis.  Holding lasix repeat dosing today        ID   MRSA bacteremia    MRSA grew initially in urine, then blood, and finally in mediastinal collection. SHEILA performed 5/8/2018 was negative for endocarditis.  Hx of prolonged immunosuppression on Prednisone and Xolair predisposing patient to fungal infection. Bronchial washing on 5/22/2018 grew MRSA.  -- continue IV Vancomycin for total of 6 weeks per ID beginning 5/25/2018 and follow up in ID clinic with weekly labs.   -- Change Vanc dose from 1250 mg to 1750 mg IV BID  -- Continue Dapsone 100 mg po qd         ABPA (allergic bronchopulmonary aspergillosis)    -- s/p voriconazole and steroid taper treated at [facility] end of 2017. Complicated by vision loss  -- continue isavuconazonium 372 mg IV qd per ID        Oncology   Acute blood loss anemia    -- Hgb has been slowly trending down over the last several days  -- Holding all AC & Gi consulted for possible EGD        Hereditary spherocytosis    -- CBC pending.  -- Haptoglobin > 250 at time of anemia re-assuring.        Endocrine   Impaired glucose tolerance    -- likely due to being on steroids chronically.   -- a1c 5.2, good poct glucose measurements earlier in admission.         GI   Abdominal wall fluid collections    -- Likely dependent edema > abdo wall hematoma  -- Continue to observe        Abnormal LFTs    -- likely due to drug adverse effect suspected from voriconazole. Switched to isavuconazonium in setting of conjunctival swelling now with improving LFT's.   -- LFT's stable.         GERD (gastroesophageal reflux disease)    -- Stable  -- Continue famotidine           Critical Care Daily Checklist:    A: Awake: RASS Goal/Actual Goal: RASS Goal: -1-->drowsy  Actual: Ricks Agitation Sedation Scale (RASS): Drowsy   B: Spontaneous Breathing Trial Performed?     C: SAT & SBT Coordinated?  Yes                      D: Delirium: CAM-ICU Overall CAM-ICU: Positive   E: Early Mobility Performed? Yes   F: Feeding Goal: Goals: Meet % EEN, EPN  Status: Nutrition Goal Status: new   Current Diet Order   Procedures    Diet NPO      AS: Analgesia/Sedation As above - propofol & fentanyl   T: Thromboembolic Prophylaxis Held   H: HOB > 300 Yes   U: Stress Ulcer Prophylaxis (if needed) As above   G: Glucose Control NA   B: Bowel Function Stool Occurrence: 1   I: Indwelling Catheter (Lines & Olivares) Necessity As above   D: De-escalation of Antimicrobials/Pharmacotherapies As above    Plan for the day/ETD Bronch    Code Status:  Family/Goals of Care: Full Code          Critical secondary to Patient has a condition that poses threat to life and bodily function: Severe Respiratory Distress      Critical care was time spent personally by me on the following activities: development of treatment plan with patient or surrogate and bedside caregivers, discussions with consultants, evaluation of patient's response to treatment, examination of patient, ordering and performing treatments and interventions, ordering and review of laboratory studies, ordering and review of radiographic studies, pulse oximetry, re-evaluation of patient's condition. This critical care time did not overlap with that of any other provider or involve time for any procedures.     Isaiah Whitaker MD  Critical Care Medicine  [facility]

## 2018-06-05 NOTE — PT/OT/SLP PROGRESS
Physical Therapy      Patient Name:  Kamla Coulter   MRN:  [id number]    Patient not seen today. Pt intubated early this AM. Pt passed move screen, but RN reported increased agitation requiring increased sedation. Pt only appropriate for one discipline today for bed level functional mobility. OT performed ROM. Will follow-up when appropriate.      Altagracia Colby, PT, DPT  6/5/2018  [phone]

## 2018-06-06 PROBLEM — R23.9 ALTERATION IN SKIN INTEGRITY: Status: ACTIVE | Noted: 2018-01-01

## 2018-06-06 NOTE — PT/OT/SLP PROGRESS
"Occupational Therapy   Treatment    Name: Kamla Coulter  MRN: [id number]  Admitting Diagnosis:  Acute hypoxemic respiratory failure       Recommendations:     Discharge Recommendations: nursing facility, skilled      Subjective     "I can't breathe" pt reports.     Communicated with: nsg prior to session.  Pain/Comfort:  · Pain Rating 1: 0/10    Patients cultural, spiritual, [employer]ism conflicts given the current situation: none reported     Objective:     Patient found supine in bed with comfort flow in place and spouse sleeping on sofa. :      General Precautions: Standard, contact, fall, aspiration   Orthopedic Precautions:N/A        Occupational Performance:    Bed Mobility:    · Patient completed Rolling/Turning to Left with  total assistance  · Patient completed Rolling/Turning to Right with total assistance  · Patient completed Supine to Sit with total assistance and 2 persons  · Patient completed Sit to Supine with total assistance and 2 persons       Activities of Daily Living:  · TOTAL A for all ADl's at this time.     Patient left supine with all lines intact, call button in reach and nsg notified    [facility] 6 Click:  [facility] Total Score: 6    Treatment & Education:  Pt was extubated this AM and currently on comfort flow oxygen. Saturation remained 88-91% with activity with SOB noted at end of session.  Pt tolerated sitting EOB x 10 min with TOTAL A. Pt with poor thoracic extension and scapular retraction. Education and cues used to facilitate upright posture including cervical extension.  Pt noted to have skin tears and weeping throughout UE's. Light ROM provided UE's with UE's elevated on pillows with absorbant pads in place.  Education provided re: safety with functional mobility/ADL skills; postural control and OT POC.  Pt was oriented to self/place and able to follow basic commands. Minimal cues needed to remain alert during session. Pt with soft/raspy voice for verbal communication. "   Education:    Assessment:     Kamla Coulter is a 57 y.o. female with a medical diagnosis of Acute hypoxemic respiratory failure.  .  Performance deficits affecting function are weakness, impaired self care skills, impaired balance, impaired functional mobilty, impaired endurance, gait instability, edema, impaired skin.  Pt tolerated session well with good effort and performance; however, pt remains significantly impaired with functional ADL and mobility skills. Pt has had fragile respiratory status with most recent extubation this AM. Pt is not safe to return home and anticipate pt will need further inpatient therapy services prior to d/c home.     Rehab Prognosis:  Good ; patient would benefit from acute skilled OT services to address these deficits and reach maximum level of function.       Plan:     Patient to be seen 4 x/week to address the above listed problems via self-care/home management, therapeutic activities, therapeutic exercises  · Plan of Care Expires: 06/27/18  · Plan of Care Reviewed with: patient    This Plan of care has been discussed with the patient who was involved in its development and understands and is in agreement with the identified goals and treatment plan    GOALS:    Occupational Therapy Goals        Problem: Occupational Therapy Goal    Goal Priority Disciplines Outcome Interventions   Occupational Therapy Goal     OT, PT/OT Ongoing (interventions implemented as appropriate)    Description:  Goals to be met by: 6/13/2018    Pt will complete supine>sit with mod A in prep for seated grooming task.  Pt will sit at EOB x10 minutes with min A for balance to wipe face with washcloth.  Pt will complete scooting along EOB with min A in prep for scoot pivot t/f to BSC.  Pt will complete sit>stand with mod A in prep for toilet transfer.  Pt will complete UB Dressing with min A                     Time Tracking:     OT Date of Treatment: 06/06/18  OT Start Time: 1330  OT Stop Time:  1400  OT Total Time (min): 30 min    Billable Minutes:Therapeutic Activity 15  Therapeutic Exercise 10    ANNABELLE Burns  6/6/2018

## 2018-06-06 NOTE — PROGRESS NOTES
Consult received on patient. Multiple areas of skin breakdown noted.  Arms edematous with multiple scattered skin tears. Arms weeping, recommend wrapping arms with covidien pads to wick moisture away from skin and change as needed once saturated.  Moisture associated dermatitis noted to bilateral buttocks/perirectal area/ and groin. Flexiseal in place, appears intact with no leaking noted around flexiseal. Recommend applying Triad barrier cream to buttocks,perirectal and groin BID and as needed. Recommend applying clear barrier cream with miconazole to skin folds beneath abdomen and breast for moisture management. Patient on [facility] bed, turn every 2hrs. Nursing to continue care.     06/06/18 0925       Wound 05/25/18 2130 Moisture associated dermatitis Buttocks   Date First Assessed/Time First Assessed: 05/25/18 2130   Pre-existing: Yes  Primary Wound Type: Moisture associated dermatitis  Side: (c)   Location: Buttocks   Wound Image    Wound WDL ex  (bilateral buttocks)   Drainage Amount Small   Drainage Characteristics/Odor Serosanguineous;No odor   Appearance Moist;Pink   Tissue loss description Partial thickness   Red (%), Wound Tissue Color 100 %   Periwound Area Moist   Wound Edges Open;Irregular   Wound Length (cm) 16 cm   Wound Width (cm) 20 cm   Wound Depth (cm) 0 cm   Wound Volume (cm^3) 0 cm^3   Care Cleansed with:;Applied:;Skin Barrier  (cleansing wipes)       Wound 05/25/18 2130 Skin Tear Arm   Date First Assessed/Time First Assessed: 05/25/18 2130   Pre-existing: Yes  Primary Wound Type: Skin Tear  Side: (c)   Location: Arm   Wound WDL ex   Drainage Amount Moderate   Drainage Characteristics/Odor Clear;Yellow   Appearance Moist;Red   Tissue loss description Partial thickness   Red (%), Wound Tissue Color 100 %   Periwound Area Ecchymotic;Swelling   Wound Edges Open;Irregular   Care (wrapped in covidien pads)

## 2018-06-06 NOTE — ASSESSMENT & PLAN NOTE
-- s/p voriconazole and steroid taper treated at [facility] end of 2017. Complicated by vision loss  -- continue isavuconazonium 372 mg IV qd per ID

## 2018-06-06 NOTE — PT/OT/SLP EVAL
Speech Language Pathology Re-Evaluation  Bedside Swallow    Patient Name:  Kamla Coulter   MRN:  [id number]   [id number]/3067 A    Admitting Diagnosis: Acute hypoxemic respiratory failure    Recommendations:                 General Recommendations:  Dysphagia therapy  Diet recommendations:  Puree, [place] Thick   · Aspiration Precautions: 1 bite/sip at a time,   · Assistance with meals,   · Check for pocketing/oral residue,   · Eliminate distractions,   · Feed only when awake/alert,   · Frequent oral care,   · HOB to 90 degrees,   · Meds crushed in puree,   · Monitor for s/s of aspiration,   · No straws,   · Remain upright 30 minutes post meal,   · Small bites/sips,   · Strict aspiration precautions and   · Wear oxygen during intake   General Precautions: Standard, contact, fall, aspiration  Communication strategies:  none     **Continue to monitor for signs and symptoms of aspiration and discontinue oral feeding should you notice any of the following: watery eyes, reddened facial area, wet vocal quality, increased work of breathing, change in respiratory status, increased congestion, coughing, fever, etc.      History:     Past Medical History:   Diagnosis Date    ABPA (allergic bronchopulmonary aspergillosis)     Allergy     Anxiety     Arachnoid cyst     monitoring    Asthma     COPD (chronic obstructive pulmonary disease)     Hemoptysis     Hypertension     Tachycardia     Thalamic disease     thalamia minor       Past Surgical History:   Procedure Laterality Date    BREAST SURGERY      breast bx     BRONCHOSCOPY  10/2016    [place]     SECTION      CHOLECYSTECTOMY      COLONOSCOPY N/A 2017    Procedure: COLONOSCOPY;  Surgeon: Horacio Pelaez MD;  Location: [facility];  Service: General;  Laterality: N/A;    DENTAL SURGERY      GALLBLADDER SURGERY      HERNIA REPAIR      INTRAUTERINE DEVICE INSERTION      SINUS SURGERY         Hospital/ICU Course:  Admitted as a transfer from [facility] on 5/25/2018 for higher level of care. Patient required BiPAP on admission but was able to wean to HFNC on second day of hospitalization. ID was consulted and had made changes to antimicrobials with continuing vancomycin, switching voriconazole to isavuconazonium and zosyn to meropenem. Blood cultures showed NGTD.  05/28/2018 Worsening respiratory status requiring intubation morning of 5/28/2018 for oxygen saturation in the lower 70's with emergent bronchoscopy done following intubation revealing left system mucus plugging. Following suctioning of mucus plugging oxygen saturation had finally improved. Repeat CT chest did not show mediastinal collection.  05/29/2018 drop in H/H requiring two units of pRBC. Heparin held. Plan for extubation today. Continuing diuresis and management of resp secretions. CT abdomen revealed no intraabdominal source of bleeding. Patient had repeat bronchoscopy and extubated to [facility].  05/30/2018 resuming heparin gtt and weaning oxygen requirement prn. Delirious overnight.   06/01/2018 NG removed by patient overnight. Patient with SLP repeat eval this am. Continue weaning off oxygen.   06/02/2018 CTA negative for PE and heparin held. Speech eval today.   06/03/2018 continue supportive care and weaning oxygen requirement and steroids.   06/04/2018 No acute events overnight.  Patient  C/o increased anxiety overnight and still on CPAP w/ PEEP 5 & FiO2 40%  Interval History/Significant Events: Patient intubated yesterday for increased WOB w/o any immediate complications.  Overnight patient had maroon BM's via rectal tube w/ Hgb this am of 6.4 after slowly trending down over the last 4 days.  Leophed weaned off overnight.  This morning patient appears anxious - propofol increased for anxiety.    Prior Intubation HX:  5/28/18 -5/29/18; 6/5-6/6    Chest X-Rays:   Results for orders placed or performed during the hospital encounter of 05/25/18   X-Ray Chest 1 View    Narrative     EXAMINATION:  XR CHEST 1 VIEW    CLINICAL HISTORY:  s/p thoracentesis;    FINDINGS:  Tubes and lines are appropriate.  There is cardiomegaly.  There is moderate edema.  There is pleural fluid left greater than right.      Impression    See above    Pulmonary edema versus pneumonia.      Electronically signed by: Shun Renae MD  Date:    06/05/2018  Time:    15:33     Prior diet: prior to intubation, SLP upgraded diet to dental soft, thin liquids with strict precautions. Per family reports, patient never had an opportunity to eat a meal between diet upgrade and requiring re-intubation.       Subjective     Patient awake with family present in room. HOB elevated.   Patient goals: for water     Pain/Comfort:  · Pain Rating 1: 0/10    Objective:     Oral Musculature Evaluation  · Oral Musculature: general weakness  · Dentition: present and adequate  · Secretion Management: adequate  · Mucosal Quality: dry  · Mandibular Strength and Mobility: impaired  · Oral Labial Strength and Mobility: functional coordination, functional seal, functional retraction  · Lingual Strength and Mobility: impaired strength  · Volitional Cough: elicited  · Volitional Swallow: timely  · Voice Prior to PO Intake: clear, strong    Bedside Swallow Eval:   Consistencies Assessed:  · Thin liquids ice chips x4, tsp sips x4, cup sips x2  · Nectar thick liquids cup sips x6  · Puree 1/2 tsp bites x5     Oral Phase:   · Slow oral transit time   · Delayed a-p transit    Pharyngeal Phase:   · delayed swallow initation   · Patient reporting some difficulty with thin liquids  · No overt s/s of aspiration noted with all trials  · Solids not trialed this date 2/2 current fatigue and slow oral transit time noted with puree trials    Compensatory Strategies  · None    Treatment: SLP provided education on puree diet and nectar thick liquid recommendation, how to achieve nectar thick consistency, s/s and risks of aspiration, importance of monitoring for s/s of  aspiration and discontinuing feeding if noted, and only feeding when awake and alert. Patient and family verbalized understanding of all discussed. Communicated recommendations with RN.     Assessment:     Kamla Coulter is a 57 y.o. female with an SLP diagnosis of Dysphagia. ST will continue to follow.    Goals:    SLP Goals        Problem: SLP Goal    Goal Priority Disciplines Outcome   SLP Goal     SLP Ongoing (interventions implemented as appropriate)   Description:  Speech Language Pathology Goals  Goals expected to be met by 6/13  1. Pt will participate in ongoing swallow assessment to determine the least restrictive and safest po diet.                        Plan:     · Patient to be seen:  4 x/week   · Plan of Care expires:  07/05/18  · Plan of Care reviewed with:  patient, family   · SLP Follow-Up:  Yes       Discharge recommendations:  nursing facility, skilled   Barriers to Discharge:  Level of Skilled Assistance Needed      Time Tracking:     SLP Treatment Date:   06/06/18  Speech Start Time:  1257  Speech Stop Time:  1317     Speech Total Time (min):  20 min    Billable Minutes: Eval Swallow and Oral Function 20    EUSEBIO Sevilla, CCC-SLP   Pager: [phone]  06/06/2018

## 2018-06-06 NOTE — PROGRESS NOTES
[facility]  Critical Care Medicine  Progress Note    Patient Name: Kamla Coulter  MRN: [id number]  Admission Date: 5/25/2018  Hospital Length of Stay: 12 days  Code Status: Full Code  Attending Provider: Rigo Zuñiga MD  Primary Care Provider: Molina Alexandre MD   Principal Problem: Acute hypoxemic respiratory failure    Subjective:     HPI:    This is a 57 year old female with hx of severe persistent asthma on Xolair, prolonged immunosuppression on steroids, ABPA,bronchiectasis, HTN, DVT, and GERD who is transferred from [facility] for higher level of care. She initially presented to the OSH on May second for progressive shortness of breath, wheezing and orthopnea over a period of 2-3 days associated with low-grade fever. She was admitted to the hospital medicine floor and started on IV solumedrol 40mg bid and was placed on BiPAP. She was weaned off to NC on the second day of admission. Blood cultures from admission grew MRSA bacteremia and patient was started on vancomycin. Urine and sputum cultures both grew MRSA. The patient had persistent MRSA bacteremia despite being on vancomycin and a SHEILA was done on 5/8/2018 that was negative for endocarditis and revealed normal EF%. A bone scan on 5/10/2018 was similarly negative for an infection nidus. On 5/10/2018 the patient developed an acute hypoxic respiratory failure that did not improve promptly on BiPAP with an FiO2 of 100% and the patient was moved to the ICU and started empirically on therapeutic Lovenox. A CTA done on the same day revealed no PE but was notable for worsening bilateral pleural effusion and a non-specific infiltrate/collection around the mid-esophagus. The collection was thought to be a loculated pleural effusion. IV solumedrol was increased to 80mg twice daily and Lovenox was decreased to ppx dosing. By 5/12/2018 the patient's respiratory failure had improved and patient was weaned off of  BiPAP to nasal cannula. Sputum cultures collected on 5/13/2018 showed budding yeast and the patient was started on voriconazole. On 5/15/2018 the patient had worsening hypoxia and increased work of breathing and a CXR showed HAP and Ceftaroline was added to vancomycin. On 5/16/2018, repeat blood cultures showed persistent MRSA bacteremia and an Indium scan performed on 5/18/2018 revealed significant uptake by a collection located adjacent to the mid-esophagus. The patient was electively intubated on 5/22/2018 and an EGD revealed a whitish plaque at the proximal esophagus that was biopsied and resulted negative for malignant changes. Similarly, on the same day, an EBUS was performed and showed fluid collection posterior to the distal trachea that was better assessed through 5 FNA passes. Cultures from the EBUS grew MRSA + Alcaligenes/Achromobcater Xylosoxidens. However, FNA was negative for any malignancies. The patient was extubated the same day. Two days later (5/24/2018), the patient developed an acute decompensated respiratory failure with oxygen saturations dropping to the 60's and the patient was emergently intubated. CXR showed left lower lobe atelectasis. An emergent bronchoscopy revealed a mucus plug in the left mainstem bronchus. The mucus plug was brown, thick, and difficult to suction raising suspicion for Aspergillus infection. Following mucus plug disimpaction, the patient's respiratory status recovered immediately and the patient was extubated the next morning (5/25/2018). Prior to that a repeat CT chest showed persistent collection around the mid-esophagus. At this point, it was decided that the patient was better served at a higher level of care facility and the patient was transferred to [facility]. The patient arrived on BiPAP 10/5 and a 45% FiO2 with good oxygen saturation.       Hospital/ICU Course:  Admitted as a transfer from [facility] on 5/25/2018 for higher level of care. Patient required  BiPAP on admission but was able to wean to HFNC on second day of hospitalization. ID was consulted and had made changes to antimicrobials with continuing vancomycin, switching voriconazole to isavuconazonium and zosyn to meropenem. Blood cultures showed NGTD.  05/28/2018 Worsening respiratory status requiring intubation morning of 5/28/2018 for oxygen saturation in the lower 70's with emergent bronchoscopy done following intubation revealing left system mucus plugging. Following suctioning of mucus plugging oxygen saturation had finally improved. Repeat CT chest did not show mediastinal collection.    05/29/2018 drop in H/H requiring two units of pRBC. Heparin held. Plan for extubation today. Continuing diuresis and management of resp secretions. CT abdomen revealed no intraabdominal source of bleeding. Patient had repeat bronchoscopy and extubated to [facility].    05/30/2018 resuming heparin gtt and weaning oxygen requirement prn. Delirious overnight.       06/01/2018 NG removed by patient overnight. Patient with SLP repeat eval this am. Continue weaning off oxygen.     06/02/2018 CTA negative for PE and heparin held. Speech eval today.     06/03/2018 continue supportive care and weaning oxygen requirement and steroids.     06/04/2018 No acute events overnight.  Patient  C/o increased anxiety overnight and still on CPAP w/ PEEP 5 & FiO2 40%, intubated for airway protection    06/05 Bronch negative for mucous plusg    06/06 Extubated       Interval History/Significant Events: Patient extubated this morning.  Folliwing simple commands however still lethargic.      Review of Systems   Unable to perform ROS: Mental status change     Objective:     Vital Signs (Most Recent):  Temp: 98 °F (36.7 °C) (06/06/18 1506)  Pulse: 93 (06/06/18 1600)  Resp: (!) 36 (06/06/18 1600)  BP: 117/63 (06/06/18 1600)  SpO2: (!) 93 % (06/06/18 1600) Vital Signs (24h Range):  Temp:  [97.1 °F (36.2 °C)-98 °F (36.7 °C)] 98 °F (36.7 °C)  Pulse:   [] 93  Resp:  [14-36] 36  SpO2:  [91 %-98 %] 93 %  BP: ()/() 117/63   Weight: 81.4 kg (179 lb 7.3 oz)  Body mass index is 32.82 kg/m².      Intake/Output Summary (Last 24 hours) at 06/06/18 1619  Last data filed at 06/06/18 1600   Gross per 24 hour   Intake           2263.7 ml   Output              765 ml   Net           1498.7 ml       Physical Exam   Constitutional: She appears well-developed. She appears ill. She appears distressed.   Cardiovascular: Regular rhythm and intact distal pulses.  Tachycardia present.  Exam reveals no gallop and no friction rub.    No murmur heard.  Pulmonary/Chest: No respiratory distress. She has decreased breath sounds (BL lower lung fields). She has no wheezes. She has rhonchi (BL diffuse). She has no rales.   Abdominal: Soft. Bowel sounds are normal. She exhibits no distension. There is no tenderness.   Musculoskeletal: She exhibits edema (No signifcant change in edema from yesterday).   Neurological: She is alert.   [id number]   Psychiatric: Her mood appears anxious.         Lines/Drains/Airways     Peripherally Inserted Central Catheter Line                 PICC Double Lumen 05/24/18 right brachial 13 days          Drain                 Urethral Catheter 05/24/18 16 Fr. 13 days         Rectal Tube 06/05/18 0600 rectal tube w/ balloon (indicate number of mLs) 1 day              Significant Labs:    CBC/Anemia Profile:    Recent Labs  Lab 06/05/18  1945 06/06/18  0417 06/06/18  0746   WBC 3.63* 3.13* 3.76*   HGB 8.6* 6.8* 8.3*   HCT 24.7* 21.0* 25.9*   * 109* 125*   MCV 94 93 93   RDW 18.6* 19.2* 19.1*        Chemistries:    Recent Labs  Lab 06/05/18  0306 06/05/18  1510 06/06/18  0417 06/06/18  0746     --  122* 140   K 3.3*  --  3.8 4.0     --  93* 104   CO2 27  --  20* 27   BUN 18  --  13 16   CREATININE 0.6  --  0.4* 0.6   CALCIUM 7.3*  --  6.1* 7.5*   ALBUMIN 1.8*  --  1.5*  --    PROT 4.3* 4.9* 4.0*  --    BILITOT 0.4  --  0.7  --    ALKPHOS  201*  --  161*  --    ALT 83*  --  67*  --    AST 51*  --  76*  --    MG 1.8  --  2.2  --    PHOS 2.2*  --  1.9*  --        ABGs:     Recent Labs  Lab 06/06/18  0401   PH 7.539*   PCO2 34.1*   HCO3 29.1*   POCSATURATED 98   BE 7       Significant Imaging:  CXR: I have reviewed all pertinent results/findings within the past 24 hours and my personal findings are:  No significant interval change from 6/4    Assessment/Plan:     Neuro   Delirium    -- likely due to prolonged ICU stay and receiving benzodiazepines (Xanax).  -- prn Zyprexa nightly for delirium and agitation.  -- Change from precedex gtt to propofol ggtt  -- delirium precautions. Resolved.         Psychiatric   Generalized anxiety disorder    - zyprexa PRN nightly for agitation and delirium.   - Appreciate psych recommendations. Continue Lexapro 5mg daily.         Ophtho   Conjunctival edema of both eyes    -- likely due to voriconazole currently improving after switching to isavuconazonium.        Pulmonary   * Acute hypoxemic respiratory failure    Improved. She was intubated on 5/28/2018 and bronchoscopy revealing mucus plugging of left main bronchial system & extubated on 5/29/2018 following repeat bronchoscopy.  Likely multifactorial with respiratory secretion, asthma exacerbation, and volume overload all likely interchangeably contributing.  Intubated 6/04 and bronch 6/05 with no mucous plugs, right sided thoracentesis 06/05, cultures sent. Extubated 6/6 to comfort flow.    -- Completed course of abx for possible HAP.   -- Continue Solumedrol 80mg IV daily  -- duonebs [id number] + 3% NS + cough assist device + use Aerobika with duonebs when appropriate.            Atelectasis    -- stable  -- continue home vest during the day        Severe persistent asthma    -- Stable  -- On Xolair and prednisone taper at home  -- continue solumedrol 80 mg IV daily.   -- continue home spiriva, breo, and singulair.  -- rest as described in hypoxic resp failure.         Cardiac/Vascular   Mixed hyperlipidemia    -- Continue home pravastatin 80 mg qd        Essential hypertension    -- Continue holding verapamil at 80mg Q8H        Renal/   Metabolic alkalosis    -- Was likely 2/2 to volume contraction alkalosis.  Holding lasix repeat dosing today        ID   MRSA bacteremia    MRSA grew initially in urine, then blood, and finally in mediastinal collection. SHEILA performed 5/8/2018 was negative for endocarditis.  Hx of prolonged immunosuppression on Prednisone and Xolair predisposing patient to fungal infection. Bronchial washing on 5/22/2018 grew MRSA.  -- continue IV Vancomycin for total of 6 weeks per ID beginning 5/25/2018 and follow up in ID clinic with weekly labs.   -- Change Vanc dose from 1250 mg to 1750 mg IV BID  -- Continue Dapsone 100 mg po qd        ABPA (allergic bronchopulmonary aspergillosis)    -- s/p voriconazole and steroid taper treated at [facility] end of 2017. Complicated by vision loss  -- continue isavuconazonium 372 mg IV qd per ID        Oncology   Acute blood loss anemia    -- Hgb has been slowly trending down over the last several days  -- Holding all AC & Gi consulted for possible EGD        Hereditary spherocytosis    -- CBC pending.  -- Haptoglobin > 250 at time of anemia re-assuring.        Endocrine   Impaired glucose tolerance    -- likely due to being on steroids chronically.   -- a1c 5.2, good poct glucose measurements earlier in admission.         GI   Abdominal wall fluid collections    -- Likely dependent edema > abdo wall hematoma  -- Continue to observe        Abnormal LFTs    -- likely due to drug adverse effect suspected from voriconazole. Switched to isavuconazonium in setting of conjunctival swelling now with improving LFT's.   -- LFT's stable.            Critical Care Daily Checklist:    A: Awake: RASS Goal/Actual Goal: RASS Goal: -1-->drowsy  Actual: Ricks Agitation Sedation Scale (RASS): Light sedation   B: Spontaneous  Breathing Trial Performed?  Yes, extubated    C: SAT & SBT Coordinated?  Extubated                       D: Delirium: CAM-ICU Overall CAM-ICU: Positive   E: Early Mobility Performed? Yes   F: Feeding Goal: Goals: Meet % EEN, EPN  Status: Nutrition Goal Status: new   Current Diet Order   Procedures    Diet NPO      AS: Analgesia/Sedation Extubated    T: Thromboembolic Prophylaxis Yes    H: HOB > 300 Yes   U: Stress Ulcer Prophylaxis (if needed) Famotidine    G: Glucose Control Sliding scale    B: Bowel Function Stool Occurrence: 0   I: Indwelling Catheter (Lines & Olivares) Necessity Olivares    D: De-escalation of Antimicrobials/Pharmacotherapies No    Plan for the day/ETD Extubated     Code Status:  Family/Goals of Care: Full Code  Discussed        Critical secondary to Patient has a condition that poses threat to life and bodily function: Severe Respiratory Distress      Critical care was time spent personally by me on the following activities: development of treatment plan with patient or surrogate and bedside caregivers, discussions with consultants, evaluation of patient's response to treatment, examination of patient, ordering and performing treatments and interventions, ordering and review of laboratory studies, ordering and review of radiographic studies, pulse oximetry, re-evaluation of patient's condition. This critical care time did not overlap with that of any other provider or involve time for any procedures.     Eriberto Adams MD  Critical Care Medicine  [facility]

## 2018-06-06 NOTE — PT/OT/SLP PROGRESS
Physical Therapy Treatment    Patient Name:  Kamla Coulter   MRN:  [id number]    Pt extubated AM of 6/6    Recommendations:     Discharge Recommendations:  nursing facility, skilled   Discharge Equipment Recommendations:  (TBD)   Barriers to discharge: Decreased caregiver support at current functional level     Assessment:     Kamla Coulter is a 57 y.o. female admitted with a medical diagnosis of Acute hypoxemic respiratory failure.  She presents with the following impairments/functional limitations:  weakness, impaired self care skills, impaired balance, impaired functional mobilty, impaired endurance, pain, impaired skin, edema, impaired cardiopulmonary response to activity.     Rehab Prognosis:  good; patient would benefit from acute skilled PT services to address these deficits and reach maximum level of function.      Recent Surgery: * No surgery found *      Plan:     During this hospitalization, patient to be seen 4 x/week to address the above listed problems via gait training, therapeutic activities, therapeutic exercises, neuromuscular re-education  · Plan of Care Expires:  07/01/18   Plan of Care Reviewed with: patient    Subjective     Communicated with RN prior to session.  Patient found in bed upon PT entry to room, agreeable to treatment.      Chief Complaint: pain/SOB  Patient comments/goals: to get better and return home   Pain/Comfort:  · Pain Rating 1: 0/10  · Pain Rating Post-Intervention 1: 0/10    Patients cultural, spiritual, Jainism conflicts given the current situation: none reported     Objective:     Patient found with: telemetry, pulse ox (continuous), blood pressure cuff, oxygen, PICC line, mays catheter     General Precautions: Standard, fall, contact   Orthopedic Precautions:N/A   Braces: N/A     Functional Mobility:  · Bed Mobility:     · Rolling Left:  total assistance  · Rolling Right: total assistance  · Scooting: total assistance  · Supine to Sit: total assistance x  2  · Sit to Supine: total assistance x 2  · Transfers: not performed   · Gait: not performed       AM-PAC 6 CLICK MOBILITY  Turning over in bed (including adjusting bedclothes, sheets and blankets)?: 2  Sitting down on and standing up from a chair with arms (e.g., wheelchair, bedside commode, etc.): 1  Moving from lying on back to sitting on the side of the bed?: 2  Moving to and from a bed to a chair (including a wheelchair)?: 1  · Need to walk in hospital room?: 1  Climbing 3-5 steps with a railing?: 1  Total Score: 8       Therapeutic Activities and Exercises:  Educated pt on PT role/POC  Educated pt on importance of OOB activity   Pt verbalized understanding    Sitting EOB x 10 minutes with total A  · B LE AAROM sitting EOB  · Ankle pumps x 10 reps  · LAQ's x 10 reps  · OT worked extensively on pt's sitting balance and posture     Patient left HOB elevated with all lines intact, call button in reach, RN notified and  present..    GOALS:    Physical Therapy Goals        Problem: Physical Therapy Goal    Goal Priority Disciplines Outcome Goal Variances Interventions   Physical Therapy Goal     PT/OT, PT Ongoing (interventions implemented as appropriate)     Description:  Goals to be met by: 2018    Patient will increase functional independence with mobility by performin. Supine to sit with Moderate Assistance - not met  2. Sit to supine with Moderate Assistance - not met  3. Sit to stand transfer with Moderate Assistance using Rolling Walker - not met  4. Bed to chair transfer with Moderate Assistance using Rolling Walker - not met  5. Gait  x 50 feet with Moderate Assistance using Rolling Walker. - not met  6. Sitting at edge of bed x5 minutes with Moderate Assistance - not met  7. Lower extremity exercise program x10 reps per handout, with independence - not emt                      Time Tracking:     PT Received On: 18  PT Start Time: 1329     PT Stop Time: 1357  PT Total Time (min): 28  min     Billable Minutes: Therapeutic Activity 15    Treatment Type: Treatment  PT/PTA: PT           Altagracia Colby PT, DPT  6/6/2018  [phone]

## 2018-06-06 NOTE — PLAN OF CARE
Problem: Physical Therapy Goal  Goal: Physical Therapy Goal  Goals to be met by: 2018    Patient will increase functional independence with mobility by performin. Supine to sit with Moderate Assistance - not met  2. Sit to supine with Moderate Assistance - not met  3. Sit to stand transfer with Moderate Assistance using Rolling Walker - not met  4. Bed to chair transfer with Moderate Assistance using Rolling Walker - not met  5. Gait  x 50 feet with Moderate Assistance using Rolling Walker. - not met  6. Sitting at edge of bed x5 minutes with Moderate Assistance - not met  7. Lower extremity exercise program x10 reps per handout, with independence - not emt     Outcome: Ongoing (interventions implemented as appropriate)  Treatment completed and no goals met. Goals appropriate.

## 2018-06-06 NOTE — ASSESSMENT & PLAN NOTE
Improved. She was intubated on 5/28/2018 and bronchoscopy revealing mucus plugging of left main bronchial system & extubated on 5/29/2018 following repeat bronchoscopy.  Likely multifactorial with respiratory secretion, asthma exacerbation, and volume overload all likely interchangeably contributing.  Intubated 6/04 and bronch 6/05 with no mucous plugs, right sided thoracentesis 06/05, cultures sent. Extubated 6/6 to comfort flow.    -- Completed course of abx for possible HAP.   -- Continue Solumedrol 80mg IV daily  -- duonebs [id number] + 3% NS + cough assist device + use Aerobika with duonebs when appropriate.

## 2018-06-06 NOTE — PROGRESS NOTES
"  [facility]  Adult Nutrition  Consult Note    SUMMARY     Recommendations    1. If able to advance diet, recommend regular diet (texture per SLP).   2. If unable to advance diet, resume enteral nutrition. Recommend Isosource 1.5 @ 50 mL/hr to provide 1800 calories (106% EEN), 82 g of protein (100% EPN), 917 mL fluid.    - Hold for residuals >500 mL; additional fluid per MD.   3. RD to monitor & follow-up.    Goals: Meet % EEN, EPN  Nutrition Goal Status: goal not met  Communication of RD Recs: reviewed with RN    Reason for Assessment    Reason for Assessment: RD follow-up  Diagnosis:  (Acute hypoxemic respiratory failure )  Relevant Medical History: COPD, HTN  Interdisciplinary Rounds: attended    General Information Comments: Pt re-intubated 6/4, but extubated this AM. Remins NPO, ST to evaluate.   Nutrition Discharge Planning: Unable to determine    Nutrition/Diet History    Patient Reported Diet/Restrictions/Preferences: general  Typical Food/Fluid Intake: adequate PTA  Food Preferences: none  Do you have any cultural, spiritual, Christianity conflicts, given your current situation?: none reported   Food Allergies: NKFA  Factors Affecting Nutritional Intake: NPO    Anthropometrics    Temp: 97.7 °F (36.5 °C)  Height Method: Stated  Height: 5' 2" (157.5 cm)  Height (inches): 62 in  Weight Method: Bed Scale  Weight: 81.4 kg (179 lb 7.3 oz)  Weight (lb): 179.46 lb  Ideal Body Weight (IBW), Female: 110 lb  % Ideal Body Weight, Female (lb): 163.15 lb  BMI (Calculated): 32.9  BMI Grade: 30 - 34.9- obesity - grade I    Lab/Procedures/Meds    Pertinent Labs Reviewed: reviewed  Pertinent Labs Comments: Na 122, Gluc 126  Pertinent Medications Reviewed: reviewed  Pertinent Medications Comments: -    Physical Findings/Assessment    Overall Physical Appearance: overweight, weak  Tubes: other (see comments) (-)  Oral/Mouth Cavity: WDL  Skin: pressure ulcer(s)    Estimated/Assessed Needs    Weight Used " For Calorie Calculations: 81.4 kg (179 lb 7.3 oz)     Energy Calorie Requirements (kcal): 1703 kcal/d  Energy Need Method: Kenedy-St Jeor (1.25 PAL)     Protein Requirements: 81-98 g/d (1-1.2 g/kg)  Weight Used For Protein Calculations: 81.4 kg (179 lb 7.3 oz)     Fluid Requirements (mL): per MD or 1 mL/kcal      CHO Requirement: 50% total kcals    Nutrition Prescription Ordered    Current Diet Order: NPO  Current Nutrition Support Formula Ordered: Discontinued     Evaluation of Received Nutrient/Fluid Intake    Comments: LBM: 6/5    Nutrition Risk    Level of Risk/Frequency of Follow-up: high     Assessment and Plan    Acute hypoxemic respiratory failure     Nutrition Problem  increased nutrient needs     Related to (etiology):   Physiological needs 2/2 Acute Res Failure. NPO     Signs and Symptoms (as evidenced by):   Consult for TF rec. Currently no form of nutrition being administered      Nutrition Diagnosis Status:   Continues      Monitor and Evaluation    Food and Nutrient Intake: energy intake, food and beverage intake, enteral nutrition intake  Food and Nutrient Adminstration: diet order, enteral and parenteral nutrition administration  Physical Activity and Function: nutrition-related ADLs and IADLs  Anthropometric Measurements: weight, weight change  Biochemical Data, Medical Tests and Procedures: lipid profile, inflammatory profile, glucose/endocrine profile, gastrointestinal profile, electrolyte and renal panel  Nutrition-Focused Physical Findings: overall appearance     Nutrition Follow-Up    RD Follow-up?: Yes

## 2018-06-06 NOTE — SUBJECTIVE & OBJECTIVE
Interval History/Significant Events: Patient extubated this morning.  Folliwing simple commands however still lethargic.      Review of Systems   Unable to perform ROS: Mental status change     Objective:     Vital Signs (Most Recent):  Temp: 98 °F (36.7 °C) (06/06/18 1506)  Pulse: 93 (06/06/18 1600)  Resp: (!) 36 (06/06/18 1600)  BP: 117/63 (06/06/18 1600)  SpO2: (!) 93 % (06/06/18 1600) Vital Signs (24h Range):  Temp:  [97.1 °F (36.2 °C)-98 °F (36.7 °C)] 98 °F (36.7 °C)  Pulse:  [] 93  Resp:  [14-36] 36  SpO2:  [91 %-98 %] 93 %  BP: ()/() 117/63   Weight: 81.4 kg (179 lb 7.3 oz)  Body mass index is 32.82 kg/m².      Intake/Output Summary (Last 24 hours) at 06/06/18 1619  Last data filed at 06/06/18 1600   Gross per 24 hour   Intake           2263.7 ml   Output              765 ml   Net           1498.7 ml       Physical Exam   Constitutional: She appears well-developed. She appears ill. She appears distressed.   Cardiovascular: Regular rhythm and intact distal pulses.  Tachycardia present.  Exam reveals no gallop and no friction rub.    No murmur heard.  Pulmonary/Chest: No respiratory distress. She has decreased breath sounds (BL lower lung fields). She has no wheezes. She has rhonchi (BL diffuse). She has no rales.   Abdominal: Soft. Bowel sounds are normal. She exhibits no distension. There is no tenderness.   Musculoskeletal: She exhibits edema (No signifcant change in edema from yesterday).   Neurological: She is alert.   [id number]   Psychiatric: Her mood appears anxious.         Lines/Drains/Airways     Peripherally Inserted Central Catheter Line                 PICC Double Lumen 05/24/18 right brachial 13 days          Drain                 Urethral Catheter 05/24/18 16 Fr. 13 days         Rectal Tube 06/05/18 0600 rectal tube w/ balloon (indicate number of mLs) 1 day              Significant Labs:    CBC/Anemia Profile:    Recent Labs  Lab 06/05/18  1945 06/06/18  0417 06/06/18  0746   WBC  3.63* 3.13* 3.76*   HGB 8.6* 6.8* 8.3*   HCT 24.7* 21.0* 25.9*   * 109* 125*   MCV 94 93 93   RDW 18.6* 19.2* 19.1*        Chemistries:    Recent Labs  Lab 06/05/18  0306 06/05/18  1510 06/06/18  0417 06/06/18  0746     --  122* 140   K 3.3*  --  3.8 4.0     --  93* 104   CO2 27  --  20* 27   BUN 18  --  13 16   CREATININE 0.6  --  0.4* 0.6   CALCIUM 7.3*  --  6.1* 7.5*   ALBUMIN 1.8*  --  1.5*  --    PROT 4.3* 4.9* 4.0*  --    BILITOT 0.4  --  0.7  --    ALKPHOS 201*  --  161*  --    ALT 83*  --  67*  --    AST 51*  --  76*  --    MG 1.8  --  2.2  --    PHOS 2.2*  --  1.9*  --        ABGs:     Recent Labs  Lab 06/06/18  0401   PH 7.539*   PCO2 34.1*   HCO3 29.1*   POCSATURATED 98   BE 7       Significant Imaging:  CXR: I have reviewed all pertinent results/findings within the past 24 hours and my personal findings are:  No significant interval change from 6/4

## 2018-06-06 NOTE — PROGRESS NOTES
Pt was extubated without parameters per MD order to Comfort flow 30L 40%. Pt tolerated extubation well, will continue to monitor.

## 2018-06-06 NOTE — PLAN OF CARE
Problem: Patient Care Overview  Goal: Plan of Care Review  Outcome: Ongoing (interventions implemented as appropriate)  NAEON. See flowsheets for vital signs and assessments. See below for updates on progress made.       Neuro: easily arousable, moves all extremities and nods yes/no    Cardiovascular: SR/ST      Pulmonary: vent: AC, rate 16, , PEEP 5, FiO2 40%, breathing 2-4 bpm over vent    Gastrointestinal: TF at goal of 30 via OGT; no bloody stool    Genitourinary: voided 240 per mays, very cloudy and concentrated     Endocrine: critical calcium 6.1; K 3.8, being replaced with 40 mEq; phos 1.9, being replaced with 20 mmol; H&H 6.8/21    Integumentary/other: second wound care cx ordered for skin tears, weeping arms, sacral breakdown    Infusions: propofol    POC: wound care cx, SBT?    Patient progressing towards goals as tolerated, plan of care communicated and reviewed with Kamla Coulter and  Kulwant. All concerns addressed. Will continue to monitor.

## 2018-06-06 NOTE — PLAN OF CARE
Problem: SLP Goal  Goal: SLP Goal  Speech Language Pathology Goals  Goals expected to be met by 6/13  1. Pt will participate in ongoing swallow assessment to determine the least restrictive and safest po diet.      Outcome: Ongoing (interventions implemented as appropriate)  Bedside swallow study completed. Recommend: Pureed diet, nectar thick liquids, strict aspiration precautions, no straws, ONLY FEED WHEN AWAKE AND ALERT. Continue to monitor for signs and symptoms of aspiration and discontinue oral feeding should you notice any of the following: watery eyes, reddened facial area, wet vocal quality, increased work of breathing, change in respiratory status, increased congestion, coughing, fever, etc. ST will continue to follow.   LEONEL Guy., CCC-SLP  Pager: [phone]  06/06/2018

## 2018-06-07 PROBLEM — J96.90 RESPIRATORY FAILURE: Status: RESOLVED | Noted: 2018-01-01 | Resolved: 2018-01-01

## 2018-06-07 NOTE — ASSESSMENT & PLAN NOTE
-- likely due to voriconazole currently improving after switching to isavuconazonium  -- Resolving

## 2018-06-07 NOTE — PT/OT/SLP PROGRESS
"Speech Language Pathology Treatment    Patient Name:  Kamla Coulter   MRN:  [id number]  Admitting Diagnosis: Acute hypoxemic respiratory failure    Recommendations:                 General Recommendations:  Dysphagia therapy  Diet recommendations:  Puree, Liquid Diet Level: Nectar Thick   · Aspiration Precautions:Aspiration Precautions: 1 bite/sip at a time,   · Assistance with meals,   · Check for pocketing/oral residue,   · Eliminate distractions,   · Feed only when awake/alert,   · Frequent oral care,   · HOB to 90 degrees,   · Meds crushed in puree,   · Monitor for s/s of aspiration,   · No straws,   · Remain upright 30 minutes post meal,   · Small bites/sips,   · Strict aspiration precautions and   · Wear oxygen during intake   · Please note, please use thickener packets with xantham gum alternative as Patient does not want any modified cornstarch products. To achieve nectar-thickened consistency, use 1 packet per 4 oz any liquid.  · Please Continue to monitor for signs and symptoms of aspiration and discontinue oral feeding should you notice any of the following: watery eyes, reddened facial area, wet vocal quality, increased work of breathing, change in respiratory status, increased congestion, coughing, fever, etc.  · General Precautions: Standard, aspiration, fall, contact, nectar thick, pureed diet  Communication strategies:  provide increased time to answer and go to room if call light pushed    Subjective     SLP reviewed Pt with nurse, nurse reports Pt tolerating diet  Pt presents anxious  She explains, "I like to hold the broth and smell it, it calms me"  Spouse explains, "She does not eat corn or anything with cornstarch"  She denies pain, endorses appetite and fatigue    Pain/Comfort:  · Pain Rating 1: 0/10  · Pain Rating Post-Intervention 1: 0/10    Objective:     Has the patient been evaluated by SLP for swallowing?   Yes  Keep patient NPO? No   Current Respiratory Status: nasal cannula (HFNC)  "     Pt seen for ongoing swallow assessment. Pt found awake in bed, spouse at bedside. SaO2 between 95-95% with HFNC. Patient with untouched lunch tray at bedside. Patient declining items on lunch meal tray. Spouse explains pureed corn on meal tray and Patient does not eat corn. SLP explains Resource Thickener has modified cornstarch in it and Patient declines thickener. Patient seen with tsp sips thin liquids fed by clinician x8. Pt with L anterior spillage x2 and throat clear x1 with trials of thin liquids. Vocal quality remains clear and strong t/o all PO trials.  Pt with increased fatigue as trials progressed and additional trials held 2/2 lethargy and decreased appetite. Patient and spouse educated on SLP role, aspiration precautions, need for ongoing swallow assessment 2/2 decreased endurance, and thickener guidelines. Both verbalize understanding of SLP education. SLP then reviews Patient's avoidance of corn and cornstarch with nurse, nurse notifies dietary team. No further questions. Whiteboard updated.     Following session, SLP returns to room with alternative thickener packets made with xanthum gum. Precautions updated at bedside and nurse aware. Spouse not present at beside upon SLP return to room.     Assessment:     Kamla Coulter is a 57 y.o. female with an SLP diagnosis of risk for Dysphagia .  She presents with increased alertness this service day.    Goals:    SLP Goals        Problem: SLP Goal    Goal Priority Disciplines Outcome   SLP Goal     SLP Ongoing (interventions implemented as appropriate)   Description:  Speech Language Pathology Goals  Goals expected to be met by 6/13  1. Pt will participate in ongoing swallow assessment to determine the least restrictive and safest po diet.                        Plan:     · Patient to be seen:  4 x/week   · Plan of Care expires:  07/05/18  · Plan of Care reviewed with:  patient, spouse   · SLP Follow-Up:  Yes       Discharge recommendations:   nursing facility, skilled   Barriers to Discharge:  Level of Skilled Assistance Needed     Time Tracking:     SLP Treatment Date:   06/07/18  Speech Start Time:  1222  Speech Stop Time:  1246     Speech Total Time (min):  24 min    Billable Minutes: Treatment Swallowing Dysfunction 24    KUSH Pierson, [facility]-SLP  Speech-Language Pathology  Pager: [phone]      06/07/2018

## 2018-06-07 NOTE — ASSESSMENT & PLAN NOTE
-- S/p voriconazole and steroid taper treated at [facility] end of 2017. Complicated by vision loss  -- Continue isavuconazonium 372 mg IV qd per ID

## 2018-06-07 NOTE — PHYSICIAN QUERY
PT Name: Kamla Coutler  MR #: [id number]     Physician Query Form - Documentation Clarification      Rudolph Boland RN CDS    Contact Information: [phone]    This form is a permanent document in the medical record.     Query Date: June 7, 2018    By submitting this query, we are merely seeking further clarification of documentation. Please utilize your independent clinical judgment when addressing the question(s) below.    The Medical record reflects the following:    Supporting Clinical Findings Location in Medical Record   Procedure:   Bronchoscopy, Diagnostic   Bronchoscopy, Therapeutic   Bronchoalveolar lavage, BAL     The bronchocope was inserted into the main airway via the endotracheal tube. An anatomical survey was done of the main airways and the subsegmental bronchus. The findings are reported above. A bronchialalveolar lavage was performed using aliquots of normal saline instilled into the airways then &aspirated back.        6/5 OP note                                                                                      Doctor, Please specify diagnosis or diagnoses associated with above clinical findings.    Please specify which section of  the lungs the procedure took place on 6/5/18    Provider Use Only        [  ] Lower Lung Lobe,  Left    [  ] Lower Lung Lobe , Right    [  ] Lung Lingula     [ x ] middle Lobe, Right    [  ] Upper Lung Lobe,  Left    [  ] Upper lung Lobe, Right    [  ] Others (specify)______________________________                                                                                                                       [  ] Clinically undetermined

## 2018-06-07 NOTE — ASSESSMENT & PLAN NOTE
-- Stable  -- On Xolair and prednisone taper at home  -- Continue solumedrol 80 mg IV daily.   -- Continue home spiriva, breo, and singulair.

## 2018-06-07 NOTE — PLAN OF CARE
Problem: Patient Care Overview  Goal: Plan of Care Review  Outcome: Ongoing (interventions implemented as appropriate)  NAEON. See flowsheets for vital signs and assessments. See below for updates on progress made.       Neuro: alert, oriented, and talking; slept well    Cardiovascular: SR/ST; normotensive      Pulmonary: ComfortFlow 28L/40%, bpm mid 20s    Gastrointestinal: passed SLP swallow eval yesterday; NPO diet, but tolerated meds in applesauce and small sips of water; flexiseal in place    Genitourinary: voiding 15-20 mL/hr, 250 mL total, very cloudy    Endocrine: K, mag, phos replaced this AM; hgb 7.4    Integumentary/other: wound care per orders    Infusions: abx    POC: improve resp status/support    Patient progressing towards goals as tolerated, plan of care communicated and reviewed with Kamla Coulter and  Kulwant at bedside. All concerns addressed. Will continue to monitor.

## 2018-06-07 NOTE — ASSESSMENT & PLAN NOTE
Improved. She was intubated on 5/28/2018 and bronchoscopy revealing mucus plugging of left main bronchial system & extubated on 5/29/2018 following repeat bronchoscopy.  Likely multifactorial with respiratory secretion, asthma exacerbation, and volume overload all likely interchangeably contributing.  Intubated 6/04 and bronch 6/05 with no mucous plugs, right sided thoracentesis 06/05, cultures sent. Extubated 6/6 to comfort flow; currently on 28L/40% FiO2: will continue to wean as appropriate  -- Completed course of abx for possible HAP.   -- Continue Solumedrol 80mg IV daily  -- Duonebs [id number] + 3% NS + cough assist device + use Aerobika with duonebs when appropriate.

## 2018-06-07 NOTE — ASSESSMENT & PLAN NOTE
MRSA grew initially in urine, then blood, and finally in mediastinal collection. SHEILA performed 5/8/2018 was negative for endocarditis.  Hx of prolonged immunosuppression on Prednisone and Xolair predisposing patient to fungal infection. Bronchial washing on 5/22/2018 grew MRSA.  -- Continue IV Vancomycin for total of 6 weeks per ID beginning 5/25/2018 and follow up in ID clinic with weekly labs( estimate end date July 6)  -- Change Vanc dose from 1250 mg to 1750 mg IV BID  -- Continue Dapsone 100 mg po qd indefinitely

## 2018-06-07 NOTE — PLAN OF CARE
Problem: SLP Goal  Goal: SLP Goal  Speech Language Pathology Goals  Goals expected to be met by 6/13  1. Pt will participate in ongoing swallow assessment to determine the least restrictive and safest po diet.       Outcome: Ongoing (interventions implemented as appropriate)  Pt ongoing with current goals. Pt easily fatigues with minimal PO trials presented today. ST to continue to follow. REC: Continue pleasure feeds puree with nectar-thickened liquids only when awake and alert, provide strict aspiration precautions  Please note, please use thickener packets with xantham gum alternative as Patient does not want any modified cornstarch products.  Packets left at bedside and findings reviewed with nurse. Please continue to monitor for signs and symptoms of aspiration and discontinue oral feeding should you notice any of the following: watery eyes, reddened facial area, wet vocal quality, increased work of breathing, change in respiratory status, increased congestion, coughing, fever, etc. ST to continue to follow. Thank you.    LEONEL Pierson., [facility]-SLP  Speech-Language Pathology  Pager: [phone]  6/7/2018

## 2018-06-07 NOTE — PLAN OF CARE
PT REMAINS IN ICU     06/07/18 1226   Discharge Reassessment   Assessment Type Discharge Planning Reassessment   Provided patient/caregiver education on the expected discharge date and the discharge plan No

## 2018-06-07 NOTE — ASSESSMENT & PLAN NOTE
-- Likely due to being on steroids chronically; now well controlled  -- A1c 5.2, good poct glucose measurements earlier in admission.

## 2018-06-07 NOTE — ASSESSMENT & PLAN NOTE
-- likely due to drug adverse effect suspected from voriconazole. Switched to isavuconazonium in setting of conjunctival swelling now with improving LFT's.   -- LFT's w/ mild increase in the past couple of days; will monitor

## 2018-06-07 NOTE — PROGRESS NOTES
[facility]  Critical Care Medicine  Progress Note    Patient Name: Kamla Coulter  MRN: [id number]  Admission Date: 5/25/2018  Hospital Length of Stay: 13 days  Code Status: Full Code  Attending Provider: Rigo Zuñiga MD  Primary Care Provider: Molina Alexandre MD   Principal Problem: Acute hypoxemic respiratory failure    Subjective:     HPI:    This is a 57 year old female with hx of severe persistent asthma on Xolair, prolonged immunosuppression on steroids, ABPA,bronchiectasis, HTN, DVT, and GERD who is transferred from [facility] for higher level of care. She initially presented to the OSH on May second for progressive shortness of breath, wheezing and orthopnea over a period of 2-3 days associated with low-grade fever. She was admitted to the hospital medicine floor and started on IV solumedrol 40mg bid and was placed on BiPAP. She was weaned off to NC on the second day of admission. Blood cultures from admission grew MRSA bacteremia and patient was started on vancomycin. Urine and sputum cultures both grew MRSA. The patient had persistent MRSA bacteremia despite being on vancomycin and a SHEILA was done on 5/8/2018 that was negative for endocarditis and revealed normal EF%. A bone scan on 5/10/2018 was similarly negative for an infection nidus. On 5/10/2018 the patient developed an acute hypoxic respiratory failure that did not improve promptly on BiPAP with an FiO2 of 100% and the patient was moved to the ICU and started empirically on therapeutic Lovenox. A CTA done on the same day revealed no PE but was notable for worsening bilateral pleural effusion and a non-specific infiltrate/collection around the mid-esophagus. The collection was thought to be a loculated pleural effusion. IV solumedrol was increased to 80mg twice daily and Lovenox was decreased to ppx dosing. By 5/12/2018 the patient's respiratory failure had improved and patient was weaned off of  BiPAP to nasal cannula. Sputum cultures collected on 5/13/2018 showed budding yeast and the patient was started on voriconazole. On 5/15/2018 the patient had worsening hypoxia and increased work of breathing and a CXR showed HAP and Ceftaroline was added to vancomycin. On 5/16/2018, repeat blood cultures showed persistent MRSA bacteremia and an Indium scan performed on 5/18/2018 revealed significant uptake by a collection located adjacent to the mid-esophagus. The patient was electively intubated on 5/22/2018 and an EGD revealed a whitish plaque at the proximal esophagus that was biopsied and resulted negative for malignant changes. Similarly, on the same day, an EBUS was performed and showed fluid collection posterior to the distal trachea that was better assessed through 5 FNA passes. Cultures from the EBUS grew MRSA + Alcaligenes/Achromobcater Xylosoxidens. However, FNA was negative for any malignancies. The patient was extubated the same day. Two days later (5/24/2018), the patient developed an acute decompensated respiratory failure with oxygen saturations dropping to the 60's and the patient was emergently intubated. CXR showed left lower lobe atelectasis. An emergent bronchoscopy revealed a mucus plug in the left mainstem bronchus. The mucus plug was brown, thick, and difficult to suction raising suspicion for Aspergillus infection. Following mucus plug disimpaction, the patient's respiratory status recovered immediately and the patient was extubated the next morning (5/25/2018). Prior to that a repeat CT chest showed persistent collection around the mid-esophagus. At this point, it was decided that the patient was better served at a higher level of care facility and the patient was transferred to [facility]. The patient arrived on BiPAP 10/5 and a 45% FiO2 with good oxygen saturation.       Hospital/ICU Course:  Admitted as a transfer from [facility] on 5/25/2018 for higher level of care. Patient required  BiPAP on admission but was able to wean to HFNC on second day of hospitalization. ID was consulted and had made changes to antimicrobials with continuing vancomycin, switching voriconazole to isavuconazonium and zosyn to meropenem. Blood cultures showed NGTD.  05/28/2018 Worsening respiratory status requiring intubation morning of 5/28/2018 for oxygen saturation in the lower 70's with emergent bronchoscopy done following intubation revealing left system mucus plugging. Following suctioning of mucus plugging oxygen saturation had finally improved. Repeat CT chest did not show mediastinal collection.    05/29/2018 drop in H/H requiring two units of pRBC. Heparin held. Plan for extubation today. Continuing diuresis and management of resp secretions. CT abdomen revealed no intraabdominal source of bleeding. Patient had repeat bronchoscopy and extubated to [facility].    05/30/2018 resuming heparin gtt and weaning oxygen requirement prn. Delirious overnight.       06/01/2018 NG removed by patient overnight. Patient with SLP repeat eval this am. Continue weaning off oxygen.     06/02/2018 CTA negative for PE and heparin held. Speech eval today.     06/03/2018 continue supportive care and weaning oxygen requirement and steroids.     06/04/2018 No acute events overnight.  Patient  C/o increased anxiety overnight and still on CPAP w/ PEEP 5 & FiO2 40%, intubated for airway protection    06/05 Bronch negative for mucous plusg    06/06 Extubated       Interval History/Significant Events: Patient on comfort flow 28L/40% FiO2. Improving in terms of respiratory function. Will start diet per SLP recs.     Review of Systems   Constitutional: Positive for fatigue. Negative for fever.   HENT: Negative.    Respiratory: Positive for shortness of breath (improving ).    Gastrointestinal: Positive for abdominal distention. Negative for abdominal pain.   Genitourinary: Negative.    Musculoskeletal:        Heel pain     Objective:     Vital  Signs (Most Recent):  Temp: 97.9 °F (36.6 °C) (06/07/18 0700)  Pulse: 108 (06/07/18 0800)  Resp: (!) 31 (06/07/18 0800)  BP: 118/66 (06/07/18 0800)  SpO2: (!) 90 % (06/07/18 0800) Vital Signs (24h Range):  Temp:  [97.6 °F (36.4 °C)-98.9 °F (37.2 °C)] 97.9 °F (36.6 °C)  Pulse:  [] 108  Resp:  [20-37] 31  SpO2:  [88 %-95 %] 90 %  BP: (108-158)/() 118/66   Weight: 81.4 kg (179 lb 7.3 oz)  Body mass index is 32.82 kg/m².      Intake/Output Summary (Last 24 hours) at 06/07/18 0821  Last data filed at 06/07/18 0800   Gross per 24 hour   Intake             1455 ml   Output              705 ml   Net              750 ml       Physical Exam   Constitutional: She appears well-developed. She appears ill. No distress.   On comfort flow, tachypneic but appears to not be in severe distress   HENT:   Head: Normocephalic and atraumatic.   Eyes: EOM are normal. Pupils are equal, round, and reactive to light.   Cardiovascular: Regular rhythm and intact distal pulses.  Tachycardia present.  Exam reveals no gallop and no friction rub.    No murmur heard.  Pulmonary/Chest: No respiratory distress. She has decreased breath sounds (BL lower lung fields). She has no wheezes. She has rhonchi (BL diffuse). She has no rales.   Abdominal: Soft. Bowel sounds are normal. She exhibits no distension. There is no tenderness.   Musculoskeletal: She exhibits edema (No signifcant change in edema from yesterday).   Neurological: She is alert.   [id number]   Skin: Skin is warm.   Notes skin sloughing in b/l upper extremities.    Psychiatric: Her mood appears anxious.         Lines/Drains/Airways     Peripherally Inserted Central Catheter Line                 PICC Double Lumen 05/24/18 right brachial 14 days          Drain                 Urethral Catheter 05/24/18 16 Fr. 14 days         Rectal Tube 06/05/18 0600 rectal tube w/ balloon (indicate number of mLs) 2 days              Significant Labs:    CBC/Anemia Profile:    Recent Labs  Lab  06/06/18  0746 06/06/18  1954 06/07/18  0305   WBC 3.76* 2.78* 2.26*   HGB 8.3* 8.1* 7.4*   HCT 25.9* 24.6* 23.6*   * 111* 105*   MCV 93 91 94   RDW 19.1* 19.1* 19.3*        Chemistries:    Recent Labs  Lab 06/05/18  1510 06/06/18  0417 06/06/18  0746 06/07/18  0305   NA  --  122* 140 141   K  --  3.8 4.0 3.7   CL  --  93* 104 107   CO2  --  20* 27 28   BUN  --  13 16 12   CREATININE  --  0.4* 0.6 0.5   CALCIUM  --  6.1* 7.5* 7.1*   ALBUMIN  --  1.5*  --  1.5*   PROT 4.9* 4.0*  --  4.0*   BILITOT  --  0.7  --  0.5   ALKPHOS  --  161*  --  199*   ALT  --  67*  --  92*   AST  --  76*  --  79*   MG  --  2.2  --  1.6   PHOS  --  1.9*  --  2.3*       ABGs:     Recent Labs  Lab 06/06/18  0401   PH 7.539*   PCO2 34.1*   HCO3 29.1*   POCSATURATED 98   BE 7       Significant Imaging:  CXR: I have reviewed all pertinent results/findings within the past 24 hours and my personal findings are:  No significant interval change from 6/4    Assessment/Plan:     Neuro   Delirium    -- Likely due to prolonged ICU stay and receiving benzodiazepines (Xanax).  -- PRN Zyprexa nightly for delirium and agitation.  -- Off of all sedation; A&O X 4        Psychiatric   Generalized anxiety disorder    - Zyprexa PRN nightly for agitation and delirium.   - Continue Lexapro 5mg daily.         Ophtho   Conjunctival edema of both eyes    -- likely due to voriconazole currently improving after switching to isavuconazonium  -- Resolving         Derm   Alteration in skin integrity    - Wound care following; appreciate recommendations  - Buttock and UE skin sloughing noted         Pulmonary   * Acute hypoxemic respiratory failure    Improved. She was intubated on 5/28/2018 and bronchoscopy revealing mucus plugging of left main bronchial system & extubated on 5/29/2018 following repeat bronchoscopy.  Likely multifactorial with respiratory secretion, asthma exacerbation, and volume overload all likely interchangeably contributing.  Intubated 6/04  and bronch 6/05 with no mucous plugs, right sided thoracentesis 06/05, cultures sent. Extubated 6/6 to comfort flow; currently on 28L/40% FiO2: will continue to wean as appropriate  -- Completed course of abx for possible HAP.   -- Continue Solumedrol 80mg IV daily  -- Duonebs [id number] + 3% NS + cough assist device + use Aerobika with duonebs when appropriate.            Atelectasis    -- stable  -- continue home vest during the day        Severe persistent asthma    -- Stable  -- On Xolair and prednisone taper at home  -- Continue solumedrol 80 mg IV daily.   -- Continue home spiriva, breo, and singulair.        Cardiac/Vascular   Mixed hyperlipidemia    -- Continue home Pravastatin 80 mg qd        Essential hypertension    -- Continue holding verapamil at 80mg Q8H        Renal/   Metabolic alkalosis    -- Was likely 2/2 to volume contraction alkalosis        ID   MRSA bacteremia    MRSA grew initially in urine, then blood, and finally in mediastinal collection. SHEILA performed 5/8/2018 was negative for endocarditis.  Hx of prolonged immunosuppression on Prednisone and Xolair predisposing patient to fungal infection. Bronchial washing on 5/22/2018 grew MRSA.  -- Continue IV Vancomycin for total of 6 weeks per ID beginning 5/25/2018 and follow up in ID clinic with weekly labs( estimate end date July 6)  -- Change Vanc dose from 1250 mg to 1750 mg IV BID  -- Continue Dapsone 100 mg po qd indefinitely         ABPA (allergic bronchopulmonary aspergillosis)    -- S/p voriconazole and steroid taper treated at [facility] end of 2017. Complicated by vision loss  -- Continue isavuconazonium 372 mg IV qd per ID        Oncology   Acute blood loss anemia    -- Hgb w/ mild decrease this AM  -- No overt source of bleeding noted        Hereditary spherocytosis    -- CBC stable w/ mild drop today; Hgb 7.4  -- Haptoglobin > 250 at time of anemia re-assuring.        Endocrine   Impaired glucose tolerance    -- Likely due to  being on steroids chronically; now well controlled  -- A1c 5.2, good poct glucose measurements earlier in admission.         GI   Abdominal wall fluid collections    -- Likely dependent edema > abdo wall hematoma  -- Continue to observe; non-tender on exam         Abnormal LFTs    -- likely due to drug adverse effect suspected from voriconazole. Switched to isavuconazonium in setting of conjunctival swelling now with improving LFT's.   -- LFT's w/ mild increase in the past couple of days; will monitor         GERD (gastroesophageal reflux disease)    -- Stable  -- Continue famotidine           Critical Care Daily Checklist:    A: Awake: RASS Goal/Actual Goal: RASS Goal: 0-->alert and calm  Actual: Ricks Agitation Sedation Scale (RASS): Alert and calm   B: Spontaneous Breathing Trial Performed? Spon. Breathing Trial Initiated?: Initiated (06/06/18 0810)   C: SAT & SBT Coordinated?  Extubated                      D: Delirium: CAM-ICU Overall CAM-ICU: Negative   E: Early Mobility Performed? YES   F: Feeding Goal: Goals: Meet % EEN, EPN  Status: Nutrition Goal Status: goal not met   Current Diet Order   Procedures    Diet Dysphagia Pureed (IDDSI Level 4) [facility]; [facility]     Order Specific Question:   Indicate patient location for additional diet options:     Answer:   [facility]     Order Specific Question:   Fluid consistency:     Answer:   Nectar Thick      AS: Analgesia/Sedation N/A   T: Thromboembolic Prophylaxis N/A   H: HOB > 300 YES   U: Stress Ulcer Prophylaxis (if needed) PPI PO   G: Glucose Control N/A   B: Bowel Function Stool Occurrence: 0   I: Indwelling Catheter (Lines & Olivares) Necessity Olivares  FlexiSeal  PICC   D: De-escalation of Antimicrobials/Pharmacotherapies N/A - 6 weeks total of Vanc as recommended by ID    Plan for the day/ETD Weaning off of O2; PO diet     Code Status:  Family/Goals of Care: Full Code         Critical secondary to Patient has a condition that poses  threat to life and bodily function: Severe Respiratory Distress, although improving and now extubated      Critical care was time spent personally by me on the following activities: development of treatment plan with patient or surrogate and bedside caregivers, discussions with consultants, evaluation of patient's response to treatment, examination of patient, ordering and performing treatments and interventions, ordering and review of laboratory studies, ordering and review of radiographic studies, pulse oximetry, re-evaluation of patient's condition. This critical care time did not overlap with that of any other provider or involve time for any procedures.     Carmella Sharif MD  Critical Care Medicine  [facility]

## 2018-06-07 NOTE — ASSESSMENT & PLAN NOTE
-- Likely due to prolonged ICU stay and receiving benzodiazepines (Xanax).  -- PRN Zyprexa nightly for delirium and agitation.  -- Off of all sedation; A&O X 4

## 2018-06-07 NOTE — SUBJECTIVE & OBJECTIVE
Interval History/Significant Events: Patient on comfort flow 28L/40% FiO2. Improving in terms of respiratory function. Will start diet per SLP recs.     Review of Systems   Constitutional: Positive for fatigue. Negative for fever.   HENT: Negative.    Respiratory: Positive for shortness of breath (improving ).    Gastrointestinal: Positive for abdominal distention. Negative for abdominal pain.   Genitourinary: Negative.    Musculoskeletal:        Heel pain     Objective:     Vital Signs (Most Recent):  Temp: 97.9 °F (36.6 °C) (06/07/18 0700)  Pulse: 108 (06/07/18 0800)  Resp: (!) 31 (06/07/18 0800)  BP: 118/66 (06/07/18 0800)  SpO2: (!) 90 % (06/07/18 0800) Vital Signs (24h Range):  Temp:  [97.6 °F (36.4 °C)-98.9 °F (37.2 °C)] 97.9 °F (36.6 °C)  Pulse:  [] 108  Resp:  [20-37] 31  SpO2:  [88 %-95 %] 90 %  BP: (108-158)/() 118/66   Weight: 81.4 kg (179 lb 7.3 oz)  Body mass index is 32.82 kg/m².      Intake/Output Summary (Last 24 hours) at 06/07/18 0821  Last data filed at 06/07/18 0800   Gross per 24 hour   Intake             1455 ml   Output              705 ml   Net              750 ml       Physical Exam   Constitutional: She appears well-developed. She appears ill. No distress.   On comfort flow, tachypneic but appears to not be in severe distress   HENT:   Head: Normocephalic and atraumatic.   Eyes: EOM are normal. Pupils are equal, round, and reactive to light.   Cardiovascular: Regular rhythm and intact distal pulses.  Tachycardia present.  Exam reveals no gallop and no friction rub.    No murmur heard.  Pulmonary/Chest: No respiratory distress. She has decreased breath sounds (BL lower lung fields). She has no wheezes. She has rhonchi (BL diffuse). She has no rales.   Abdominal: Soft. Bowel sounds are normal. She exhibits no distension. There is no tenderness.   Musculoskeletal: She exhibits edema (No signifcant change in edema from yesterday).   Neurological: She is alert.   E4VTM6   Skin: Skin  is warm.   Notes skin sloughing in b/l upper extremities.    Psychiatric: Her mood appears anxious.         Lines/Drains/Airways     Peripherally Inserted Central Catheter Line                 PICC Double Lumen 05/24/18 right brachial 14 days          Drain                 Urethral Catheter 05/24/18 16 Fr. 14 days         Rectal Tube 06/05/18 0600 rectal tube w/ balloon (indicate number of mLs) 2 days              Significant Labs:    CBC/Anemia Profile:    Recent Labs  Lab 06/06/18  0746 06/06/18  1954 06/07/18  0305   WBC 3.76* 2.78* 2.26*   HGB 8.3* 8.1* 7.4*   HCT 25.9* 24.6* 23.6*   * 111* 105*   MCV 93 91 94   RDW 19.1* 19.1* 19.3*        Chemistries:    Recent Labs  Lab 06/05/18  1510 06/06/18  0417 06/06/18  0746 06/07/18  0305   NA  --  122* 140 141   K  --  3.8 4.0 3.7   CL  --  93* 104 107   CO2  --  20* 27 28   BUN  --  13 16 12   CREATININE  --  0.4* 0.6 0.5   CALCIUM  --  6.1* 7.5* 7.1*   ALBUMIN  --  1.5*  --  1.5*   PROT 4.9* 4.0*  --  4.0*   BILITOT  --  0.7  --  0.5   ALKPHOS  --  161*  --  199*   ALT  --  67*  --  92*   AST  --  76*  --  79*   MG  --  2.2  --  1.6   PHOS  --  1.9*  --  2.3*       ABGs:     Recent Labs  Lab 06/06/18  0401   PH 7.539*   PCO2 34.1*   HCO3 29.1*   POCSATURATED 98   BE 7       Significant Imaging:  CXR: I have reviewed all pertinent results/findings within the past 24 hours and my personal findings are:  No significant interval change from 6/4

## 2018-06-08 NOTE — ASSESSMENT & PLAN NOTE
-- S/p voriconazole and steroid taper treated at [facility] end of 2017. Complicated by vision loss  -- Continue isavuconazonium 372 mg IV qd per ID; possible contribution to patients rising LFTs but will continue as chronic medication and will follow labs closely

## 2018-06-08 NOTE — PLAN OF CARE
Problem: Patient Care Overview  Goal: Plan of Care Review  Outcome: Ongoing (interventions implemented as appropriate)  No acute events throughout day. See vital signs and assessments in flowsheets. See below for updates on today's progress.     Pulmonary: Patient remains on comfort flow, settings increased earlier in shift with patient complaining of SOB with audible wheezes to auscultation. Weaned down to 20L 50%, sats >95%. CCS at bedside to perform US to check for pleural effusions and possible need for tap. Patient sat on EOB, thoracentesis not indicated. Decision made for lasix gtt.     Cardiovascular: Patient had episode of SVT with HR in the 160s, esmolol drip started with proper control of HR and eventual drop in blood pressure. Esmolol immediately turned off by RN and CCS MD Covington called to bedside. Pressure increased to a MAP of 61 and verbal order by Dr Covington for ok of MAP>60. Repeat blood pressures x 2 readings q 15 minutes WNL, with a drop in the third reading to 65/33. CCS Dr Covington called to bedside again with orders to start a christian drip. Christian bump pulled from pxyis in the mean time to increase pressure with a secondary RN Magali mixing bag of christian at the bedside. Verbal orders for 50mcg of christian x4 occurences before initiation of christian gtt. With initiation of christian gtt, patient blood pressure increased with adequate results, orders to keep MAP>65.    Neurological: Patient AAOx4. A lot of anxiety noted.     Gastrointestinal: Flexi in place with no output. Laxatives refused. Alternative thickener without cornstarch ordered for dietary restrictions per patients . SLP spoke with dietary to come by and see patient regarding restrictions.     Genitourinary: Olivares intact, lasix 80mg IVP x1 with adequate results.     Endocrine: Normoglycemic    Integumentary/Other: See flowsheets for charting. Bath given, sheets changed. Antifungal and triad creams applied. 5% lido ordered for pain in heels, applied bilaterally.      Infusions: Esmolol stopped, cheyanne started.     Patient progressing towards goals as tolerated, plan of care communicated and reviewed with Kamla Coulter and family. All concerns addressed. Will continue to monitor.

## 2018-06-08 NOTE — PT/OT/SLP PROGRESS
"Occupational Therapy   Treatment    Name: Kamla Coulter  MRN: [id number]  Admitting Diagnosis:  Acute hypoxemic respiratory failure       Recommendations:     Discharge Recommendations: LTACH (long term acute care hospital)      Subjective       "I don't want to do it today" pt reports referring to sitting EOB.     Communicated with: nsg prior to session.  Pain/Comfort:  · Pain Rating 1: 7/10  · Location 1:  (B LE's and back)  · Pain Addressed 1: Reposition, Nurse notified, Distraction    Patients cultural, spiritual, Jew conflicts given the current situation: none reported     Objective:     Patient found supine in bed with comfort flow oxygen in place with saturation stable from 93-94% at rest and with activity. However, elevated HR with EOB sitting to 168 BPM. :      General Precautions: Standard, aspiration, contact, fall   Orthopedic Precautions:N/A   Braces:       Occupational Performance:    Bed Mobility:    · Patient completed Rolling/Turning to Left with TOTAL A x 2  · Patient completed Rolling/Turning to Right with TOTAL A x 2  · Patient completed Supine to Sit with TOTAL A x 2  · Patient completed Sit to Supine with TOTAL A x 2    Activities of Daily Living:  · Pt remains TOTAL A for all ADL's at this time.     Patient left supine with all lines intact, call button in reach, nsg notified and spouse present    [facility] 6 Click:  [facility] Total Score: 6    Treatment & Education:  Pt initially declined session stating she did not want to participate this date. OT provided increased time, encouragement and education re: importance of participation with therapy and benefits of mobility.  Pt tolerated sitting EOB approx 1 min. Pt with elevated HR from 130s to 168 BPM. Nsg advised pt to return supine. Once supine, HR slowly decreased again.Education:    Spouse returned to room and OT provided education to pt/spouse re: current POC including 4 x week. OT discussed pt's lack of tolerance for further sitting " activity this date due to elevated HR. OT provided further education to pt/spouse re: importance and technique of AA/PROM UE's several times daily and positioning of UE with pillow for support and to assist with edema vini't. Pt/spouse verb understanding.     Assessment:     Kamla Coulter is a 57 y.o. female with a medical diagnosis of Acute hypoxemic respiratory failure. Performance deficits affecting function are weakness, impaired functional mobilty, gait instability, impaired self care skills, impaired endurance.    Pt tolerated session fair. Pt limited with sitting activity due to elevated HR. Pt with limited progress with therapy complicated by medical status/respiratory status. OT adjusted d/c rec at this time to LTAC as OT anticipates pt will require increased time to progress functionally given current performance and limited progress toward stated goals at this time   Rehab Prognosis:  Fair ; patient would benefit from acute skilled OT services to address these deficits and reach maximum level of function.       Plan:     Patient to be seen 4 x/week to address the above listed problems via self-care/home management, therapeutic exercises, therapeutic activities  · Plan of Care Expires: 06/27/18  · Plan of Care Reviewed with: patient, spouse    This Plan of care has been discussed with the patient who was involved in its development and understands and is in agreement with the identified goals and treatment plan    GOALS:    Occupational Therapy Goals        Problem: Occupational Therapy Goal    Goal Priority Disciplines Outcome Interventions   Occupational Therapy Goal     OT, PT/OT Ongoing (interventions implemented as appropriate)    Description:  Goals to be met by: 6/13/2018    Pt will complete supine>sit with mod A in prep for seated grooming task.  Pt will sit at EOB x10 minutes with min A for balance to wipe face with washcloth.  Pt will complete scooting along EOB with min A in prep for  scoot pivot t/f to BS.  Pt will complete sit>stand with mod A in prep for toilet transfer.  Pt will complete UB Dressing with min A                     Time Tracking:     OT Date of Treatment: 06/08/18  OT Start Time: 0925  OT Stop Time: 1000  OT Total Time (min): 35 min    Billable Minutes:Therapeutic Activity 15    ANNABELLE Burns  6/8/2018

## 2018-06-08 NOTE — ASSESSMENT & PLAN NOTE
- Wound care following; appreciate recommendations  - Buttock and UE skin sloughing noted; noted R sided medial abdomen sloughing as well

## 2018-06-08 NOTE — PROGRESS NOTES
"Patient quoted " I cannot breathe. I want the BiPAP."  Called Critical Care MD for order. Patient placed on BiPAP 12/5 40%. Alarms set and functioning.   and SpO2 95%. Patient is tolerating well. Will continue to monitor.   "

## 2018-06-08 NOTE — ASSESSMENT & PLAN NOTE
-- likely due to drug adverse effect suspected from voriconazole. Switched to isavuconazonium in setting of conjunctival swelling now with improving LFT's.   -- LFT's w/ mild increase in the past couple of days - could be the combination of episodic hypotension yesterday prior to esmolol discontinuation vs. Chronic use of isaniconazonium for aspergillosis   -- Will continue to closely monitor

## 2018-06-08 NOTE — PROGRESS NOTES
[facility]  Critical Care Medicine  Progress Note    Patient Name: Kamla Coulter  MRN: [id number]  Admission Date: 5/25/2018  Hospital Length of Stay: 14 days  Code Status: Full Code  Attending Provider: Rigo Zuñiga MD  Primary Care Provider: Molina Alexandre MD   Principal Problem: Acute hypoxemic respiratory failure    Subjective:     HPI:    This is a 57 year old female with hx of severe persistent asthma on Xolair, prolonged immunosuppression on steroids, ABPA,bronchiectasis, HTN, DVT, and GERD who is transferred from [facility] for higher level of care. She initially presented to the OSH on May second for progressive shortness of breath, wheezing and orthopnea over a period of 2-3 days associated with low-grade fever. She was admitted to the hospital medicine floor and started on IV solumedrol 40mg bid and was placed on BiPAP. She was weaned off to NC on the second day of admission. Blood cultures from admission grew MRSA bacteremia and patient was started on vancomycin. Urine and sputum cultures both grew MRSA. The patient had persistent MRSA bacteremia despite being on vancomycin and a SHEILA was done on 5/8/2018 that was negative for endocarditis and revealed normal EF%. A bone scan on 5/10/2018 was similarly negative for an infection nidus. On 5/10/2018 the patient developed an acute hypoxic respiratory failure that did not improve promptly on BiPAP with an FiO2 of 100% and the patient was moved to the ICU and started empirically on therapeutic Lovenox. A CTA done on the same day revealed no PE but was notable for worsening bilateral pleural effusion and a non-specific infiltrate/collection around the mid-esophagus. The collection was thought to be a loculated pleural effusion. IV solumedrol was increased to 80mg twice daily and Lovenox was decreased to ppx dosing. By 5/12/2018 the patient's respiratory failure had improved and patient was weaned off of  BiPAP to nasal cannula. Sputum cultures collected on 5/13/2018 showed budding yeast and the patient was started on voriconazole. On 5/15/2018 the patient had worsening hypoxia and increased work of breathing and a CXR showed HAP and Ceftaroline was added to vancomycin. On 5/16/2018, repeat blood cultures showed persistent MRSA bacteremia and an Indium scan performed on 5/18/2018 revealed significant uptake by a collection located adjacent to the mid-esophagus. The patient was electively intubated on 5/22/2018 and an EGD revealed a whitish plaque at the proximal esophagus that was biopsied and resulted negative for malignant changes. Similarly, on the same day, an EBUS was performed and showed fluid collection posterior to the distal trachea that was better assessed through 5 FNA passes. Cultures from the EBUS grew MRSA + Alcaligenes/Achromobcater Xylosoxidens. However, FNA was negative for any malignancies. The patient was extubated the same day. Two days later (5/24/2018), the patient developed an acute decompensated respiratory failure with oxygen saturations dropping to the 60's and the patient was emergently intubated. CXR showed left lower lobe atelectasis. An emergent bronchoscopy revealed a mucus plug in the left mainstem bronchus. The mucus plug was brown, thick, and difficult to suction raising suspicion for Aspergillus infection. Following mucus plug disimpaction, the patient's respiratory status recovered immediately and the patient was extubated the next morning (5/25/2018). Prior to that a repeat CT chest showed persistent collection around the mid-esophagus. At this point, it was decided that the patient was better served at a higher level of care facility and the patient was transferred to [facility]. The patient arrived on BiPAP 10/5 and a 45% FiO2 with good oxygen saturation.       Hospital/ICU Course:  Admitted as a transfer from [facility] on 5/25/2018 for higher level of care. Patient required  BiPAP on admission but was able to wean to HFNC on second day of hospitalization. ID was consulted and had made changes to antimicrobials with continuing vancomycin, switching voriconazole to isavuconazonium and zosyn to meropenem. Blood cultures showed NGTD.  05/28/2018 Worsening respiratory status requiring intubation morning of 5/28/2018 for oxygen saturation in the lower 70's with emergent bronchoscopy done following intubation revealing left system mucus plugging. Following suctioning of mucus plugging oxygen saturation had finally improved. Repeat CT chest did not show mediastinal collection.    05/29/2018 drop in H/H requiring two units of pRBC. Heparin held. Plan for extubation today. Continuing diuresis and management of resp secretions. CT abdomen revealed no intraabdominal source of bleeding. Patient had repeat bronchoscopy and extubated to [facility].    05/30/2018 resuming heparin gtt and weaning oxygen requirement prn. Delirious overnight.       06/01/2018 NG removed by patient overnight. Patient with SLP repeat eval this am. Continue weaning off oxygen.     06/02/2018 CTA negative for PE and heparin held. Speech eval today.     06/03/2018 continue supportive care and weaning oxygen requirement and steroids.     06/04/2018 No acute events overnight.  Patient  C/o increased anxiety overnight and still on CPAP w/ PEEP 5 & FiO2 40%, intubated for airway protection    06/05 Bronch negative for mucous plusg    06/06 Extubated   06/07 Lasix gtt for diuresis. Esmolol d/c'ed due to hypotension; Christian drip started transiently and off at 20:26  06/08 SBP improved. LFTs trending up and HR 120s    Interval History/Significant Events: Patient on comfort flow 22L/50% FiO2 after briefly requiring BiPAP early this AM. Diuresing well w/ no renal function worsening. LFTs trending up possible 2/2 chronic isaniconazonium use. Will monitor.   Review of Systems   Constitutional: Positive for fatigue. Negative for fever.   HENT:  Negative.    Respiratory: Positive for shortness of breath (improving ).    Gastrointestinal: Positive for abdominal distention. Negative for abdominal pain.   Genitourinary: Negative.    Musculoskeletal:        Heel pain     Objective:     Vital Signs (Most Recent):  Temp: 98.9 °F (37.2 °C) (06/08/18 0700)  Pulse: (!) 134 (06/08/18 1000)  Resp: (!) 32 (06/08/18 1000)  BP: (!) 133/91 (06/08/18 1000)  SpO2: (!) 94 % (06/08/18 1000) Vital Signs (24h Range):  Temp:  [97.7 °F (36.5 °C)-98.9 °F (37.2 °C)] 98.9 °F (37.2 °C)  Pulse:  [104-160] 134  Resp:  [18-72] 32  SpO2:  [90 %-98 %] 94 %  BP: ()/() 133/91   Weight: 81.4 kg (179 lb 7.3 oz)  Body mass index is 32.82 kg/m².      Intake/Output Summary (Last 24 hours) at 06/08/18 1052  Last data filed at 06/08/18 1000   Gross per 24 hour   Intake          1337.42 ml   Output             4397 ml   Net         -3059.58 ml       Physical Exam   Constitutional: She appears well-developed. She appears ill. No distress.   On comfort flow, tachypneic but appears to not be in severe distress   HENT:   Head: Normocephalic and atraumatic.   Eyes: EOM are normal. Pupils are equal, round, and reactive to light.   Cardiovascular: Regular rhythm and intact distal pulses.  Tachycardia present.  Exam reveals no gallop and no friction rub.    No murmur heard.  Pulmonary/Chest: No respiratory distress. She has decreased breath sounds (BL lower lung fields). She has no wheezes. She has rhonchi (BL diffuse). She has no rales.   Abdominal: Soft. Bowel sounds are normal. She exhibits no distension. There is no tenderness.   Musculoskeletal: She exhibits edema (No signifcant change in edema from yesterday).   Neurological: She is alert.   [id number]   Skin: Skin is warm.   Notes skin sloughing in b/l upper extremities.    Psychiatric: Her mood appears anxious.         Lines/Drains/Airways     Peripherally Inserted Central Catheter Line                 PICC Double Lumen 05/24/18 right  brachial 15 days          Drain                 Rectal Tube 06/05/18 0600 rectal tube w/ balloon (indicate number of mLs) 3 days         Urethral Catheter 06/07/18 2032 less than 1 day              Significant Labs:    CBC/Anemia Profile:    Recent Labs  Lab 06/07/18  0305 06/07/18  1707 06/08/18  0500   WBC 2.26* 2.30* 1.86*   HGB 7.4* 8.3* 8.8*   HCT 23.6* 25.6* 27.0*   * 102* 100*   MCV 94 94 93   RDW 19.3* 20.0* 19.8*        Chemistries:    Recent Labs  Lab 06/07/18  0305 06/07/18  1259 06/08/18  0006 06/08/18  0500    140 142 141   K 3.7 4.3 3.5 3.5    106 105 103   CO2 28 23 25 28   BUN 12 12 13 14   CREATININE 0.5 0.6 0.7 0.6   CALCIUM 7.1* 7.1* 7.1* 7.4*   ALBUMIN 1.5*  --   --  1.7*   PROT 4.0*  --   --  4.8*   BILITOT 0.5  --   --  0.5   ALKPHOS 199*  --   --  262*   ALT 92*  --   --  142*   AST 79*  --   --  118*   MG 1.6  --  1.7 2.3   PHOS 2.3*  --  3.2 3.0       ABGs:   No results for input(s): PH, PCO2, HCO3, POCSATURATED, BE in the last 48 hours.    Significant Imaging:  CXR: I have reviewed all pertinent results/findings within the past 24 hours and my personal findings are:  No significant interval change from 6/4    Assessment/Plan:     Neuro   Delirium    -- Likely due to prolonged ICU stay and receiving benzodiazepines (Xanax).  -- PRN Zyprexa nightly for delirium and agitation.  -- Off of all sedation; A&O X 4        Psychiatric   Generalized anxiety disorder    - Zyprexa PRN nightly for agitation and delirium.   - Continue Lexapro 5mg daily.         Ophtho   Conjunctival edema of both eyes    -- likely due to voriconazole currently improving after switching to isavuconazonium  -- Improving; L>R conjunctival edema noted         Derm   Alteration in skin integrity    - Wound care following; appreciate recommendations  - Buttock and UE skin sloughing noted; noted R sided medial abdomen sloughing as well         Pulmonary   * Acute hypoxemic respiratory failure    Improved. She  was intubated on 5/28/2018 and bronchoscopy revealing mucus plugging of left main bronchial system & extubated on 5/29/2018 following repeat bronchoscopy.  Likely multifactorial with respiratory secretion, asthma exacerbation, and volume overload all likely interchangeably contributing.  Intubated 6/04 and bronch 6/05 with no mucous plugs, right sided thoracentesis 06/05, cultures sent. Extubated 6/6 to comfort flow; currently on 28L/40% FiO2: will continue to wean as appropriate  -- Completed course of abx for possible HAP.   -- Continue Solumedrol 80mg IV daily  -- Duonebs [id number] + 3% NS + cough assist device + use Aerobika with duonebs when appropriate.  -- Lasix gtt started on 06/07 for hypervolemia contributing to pt's symptoms; -2,545 in the past 24 hours and good U/O  -- CF w/ intermittent BiPAP use            Atelectasis    -- stable  -- continue home vest during the day        Severe persistent asthma    -- Stable  -- On Xolair and prednisone taper at home  -- Continue solumedrol 80 mg IV daily.   -- Continue home spiriva, breo, and singulair.        Cardiac/Vascular   Mixed hyperlipidemia    -- Continue home Pravastatin 80 mg qd        Essential hypertension    -- Continue holding verapamil at 80mg Q8H        Renal/   Metabolic alkalosis    -- Was likely 2/2 to volume contraction alkalosis        ID   MRSA bacteremia    MRSA grew initially in urine, then blood, and finally in mediastinal collection. SHEILA performed 5/8/2018 was negative for endocarditis.  Hx of prolonged immunosuppression on Prednisone and Xolair predisposing patient to fungal infection. Bronchial washing on 5/22/2018 grew MRSA.  -- Continue IV Vancomycin for total of 6 weeks per ID beginning 5/25/2018 and follow up in ID clinic with weekly labs( estimate end date July 6)  -- Change Vanc dose from 1250 mg to 1750 mg IV BID  -- Continue Dapsone 100 mg po qd indefinitely        ABPA (allergic bronchopulmonary aspergillosis)    -- S/p  voriconazole and steroid taper treated at [facility] end of 2017. Complicated by vision loss  -- Continue isavuconazonium 372 mg IV qd per ID; possible contribution to patients rising LFTs but will continue as chronic medication and will follow labs closely         Oncology   Acute blood loss anemia    -- Hgb w/ continues to slowly trend up  -- No overt source of bleeding noted        Hereditary spherocytosis    -- CBC stable w/ improvement; Hgb 8.8 and no signs of blood loss  -- Haptoglobin > 250 at time of anemia re-assuring.        Endocrine   Impaired glucose tolerance    -- Likely due to being on steroids chronically; now well controlled  -- A1c 5.2, good poct glucose measurements earlier in admission.         GI   Abdominal wall fluid collections    -- Likely dependent edema > abdo wall hematoma  -- Continue to observe; non-tender on exam  -- Lasix gtt         Abnormal LFTs    -- likely due to drug adverse effect suspected from voriconazole. Switched to isavuconazonium in setting of conjunctival swelling now with improving LFT's.   -- LFT's w/ mild increase in the past couple of days - could be the combination of episodic hypotension yesterday prior to esmolol discontinuation vs. Chronic use of isaniconazonium for aspergillosis   -- Will continue to closely monitor         GERD (gastroesophageal reflux disease)    -- Stable  -- Continue famotidine            Critical Care Daily Checklist:     A: Awake: RASS Goal/Actual Goal: RASS Goal: 0-->alert and calm  Actual: Ricks Agitation Sedation Scale (RASS): Alert and calm   B: Spontaneous Breathing Trial Performed? Spon. Breathing Trial Initiated?: Initiated (06/06/18 0810)   C: SAT & SBT Coordinated?  Extubated                      D: Delirium: CAM-ICU Overall CAM-ICU: Negative   E: Early Mobility Performed? YES   F: Feeding Goal: Goals: Meet % EEN, EPN  Status: Nutrition Goal Status: goal not met         Current Diet Order   Procedures    Diet  Dysphagia Pureed (IDDSI Level 4) [facility]; [place] Thick       Order Specific Question:   Indicate patient location for additional diet options:       Answer:   [facility]       Order Specific Question:   Fluid consistency:       Answer:   [place] Thick       AS: Analgesia/Sedation N/A   T: Thromboembolic Prophylaxis N/A   H: HOB > 300 YES   U: Stress Ulcer Prophylaxis (if needed) PPI PO   G: Glucose Control N/A   B: Bowel Function Stool Occurrence: 0   I: Indwelling Catheter (Lines & Olivares) Necessity Olivares  FlexiSeal  PICC   D: De-escalation of Antimicrobials/Pharmacotherapies N/A - 6 weeks total of Vanc as recommended by ID     Plan for the day/ETD Weaning off of O2; PO diet and diuresis      Code Status:  Family/Goals of Care: Full Code           Critical secondary to Patient has a condition that poses threat to life and bodily function: Severe Respiratory Distress, although improving       Critical care was time spent personally by me on the following activities: development of treatment plan with patient or surrogate and bedside caregivers, discussions with consultants, evaluation of patient's response to treatment, examination of patient, ordering and performing treatments and interventions, ordering and review of laboratory studies, ordering and review of radiographic studies, pulse oximetry, re-evaluation of patient's condition. This critical care time did not overlap with that of any other provider or involve time for any procedures.     Carmella Sharif MD  Critical Care Medicine  [facility]

## 2018-06-08 NOTE — SUBJECTIVE & OBJECTIVE
Interval History/Significant Events: Patient on comfort flow 22L/50% FiO2 after briefly requiring BiPAP early this AM. Diuresing well w/ no renal function worsening. LFTs trending up possible 2/2 chronic isaniconazonium use. Will monitor.   Review of Systems   Constitutional: Positive for fatigue. Negative for fever.   HENT: Negative.    Respiratory: Positive for shortness of breath (improving ).    Gastrointestinal: Positive for abdominal distention. Negative for abdominal pain.   Genitourinary: Negative.    Musculoskeletal:        Heel pain     Objective:     Vital Signs (Most Recent):  Temp: 98.9 °F (37.2 °C) (06/08/18 0700)  Pulse: (!) 134 (06/08/18 1000)  Resp: (!) 32 (06/08/18 1000)  BP: (!) 133/91 (06/08/18 1000)  SpO2: (!) 94 % (06/08/18 1000) Vital Signs (24h Range):  Temp:  [97.7 °F (36.5 °C)-98.9 °F (37.2 °C)] 98.9 °F (37.2 °C)  Pulse:  [104-160] 134  Resp:  [18-72] 32  SpO2:  [90 %-98 %] 94 %  BP: ()/() 133/91   Weight: 81.4 kg (179 lb 7.3 oz)  Body mass index is 32.82 kg/m².      Intake/Output Summary (Last 24 hours) at 06/08/18 1052  Last data filed at 06/08/18 1000   Gross per 24 hour   Intake          1337.42 ml   Output             4397 ml   Net         -3059.58 ml       Physical Exam   Constitutional: She appears well-developed. She appears ill. No distress.   On comfort flow, tachypneic but appears to not be in severe distress   HENT:   Head: Normocephalic and atraumatic.   Eyes: EOM are normal. Pupils are equal, round, and reactive to light.   Cardiovascular: Regular rhythm and intact distal pulses.  Tachycardia present.  Exam reveals no gallop and no friction rub.    No murmur heard.  Pulmonary/Chest: No respiratory distress. She has decreased breath sounds (BL lower lung fields). She has no wheezes. She has rhonchi (BL diffuse). She has no rales.   Abdominal: Soft. Bowel sounds are normal. She exhibits no distension. There is no tenderness.   Musculoskeletal: She exhibits edema (No  signifcant change in edema from yesterday).   Neurological: She is alert.   E4VTM6   Skin: Skin is warm.   Notes skin sloughing in b/l upper extremities.    Psychiatric: Her mood appears anxious.         Lines/Drains/Airways     Peripherally Inserted Central Catheter Line                 PICC Double Lumen 05/24/18 right brachial 15 days          Drain                 Rectal Tube 06/05/18 0600 rectal tube w/ balloon (indicate number of mLs) 3 days         Urethral Catheter 06/07/18 2032 less than 1 day              Significant Labs:    CBC/Anemia Profile:    Recent Labs  Lab 06/07/18  0305 06/07/18  1707 06/08/18  0500   WBC 2.26* 2.30* 1.86*   HGB 7.4* 8.3* 8.8*   HCT 23.6* 25.6* 27.0*   * 102* 100*   MCV 94 94 93   RDW 19.3* 20.0* 19.8*        Chemistries:    Recent Labs  Lab 06/07/18  0305 06/07/18  1259 06/08/18  0006 06/08/18  0500    140 142 141   K 3.7 4.3 3.5 3.5    106 105 103   CO2 28 23 25 28   BUN 12 12 13 14   CREATININE 0.5 0.6 0.7 0.6   CALCIUM 7.1* 7.1* 7.1* 7.4*   ALBUMIN 1.5*  --   --  1.7*   PROT 4.0*  --   --  4.8*   BILITOT 0.5  --   --  0.5   ALKPHOS 199*  --   --  262*   ALT 92*  --   --  142*   AST 79*  --   --  118*   MG 1.6  --  1.7 2.3   PHOS 2.3*  --  3.2 3.0       ABGs:   No results for input(s): PH, PCO2, HCO3, POCSATURATED, BE in the last 48 hours.    Significant Imaging:  CXR: I have reviewed all pertinent results/findings within the past 24 hours and my personal findings are:  No significant interval change from 6/4

## 2018-06-08 NOTE — PT/OT/SLP PROGRESS
"Speech Language Pathology Treatment    Patient Name:  Kamla Coulter   MRN:  [id number]  Admitting Diagnosis: Acute hypoxemic respiratory failure    Recommendations:                 General Recommendations:  Dysphagia therapy  Diet recommendations:  Puree, Liquid Diet Level: Nectar Thick   Aspiration Precautions:  · 1 bite/sip at a time,   · Assistance with meals,   · Check for pocketing/oral residue,   · Eliminate distractions,   · Feed only when awake/alert,   · Frequent oral care,   · HOB to 90 degrees,   · Meds crushed in puree,   · Monitor for s/s of aspiration,   · No straws,   · Remain upright 30 minutes post meal,   · Small bites/sips,   · Strict aspiration precautions and   · Wear oxygen during intake   · Please note, please use thickener packets with xantham gum alternative as Patient does not want any modified cornstarch products. To achieve nectar-thickened consistency, use 1 packet per 4 oz any liquid.  · Please Continue to monitor for signs and symptoms of aspiration and discontinue oral feeding should you notice any of the following: watery eyes, reddened facial area, wet vocal quality, increased work of breathing, change in respiratory status, increased congestion, coughing, fever, etc.  General Precautions: Standard, aspiration, contact, fall, respiratory  Communication strategies:  provide increased time to answer and go to room if call light pushed    Subjective     SLP reviewed Pt with nurse, nurse reports Pt with decreased participation with therapy today  Pt presents anxious  She explains, "i need to lie flat, there is something behind me I need to lie flat"  She reports intense back pain     Pain/Comfort:  · Pain Rating 1: 8/10  · Location - Orientation 1: lower  · Location 1: back  · Pain Addressed 1: Cessation of Activity, Nurse notified  · Pain Rating Post-Intervention 1: 8/10    Objective:     Has the patient been evaluated by SLP for swallowing?   Yes  Keep patient NPO? No   Current " Respiratory Status: nasal cannula (HFNC )    CXR: 6/5/18:Pulmonary edema versus pneumonia.    Pt seen for ongoing monitoring of tolerance or diet. Upon first attempt, Patient awake in bed, upright, holding mug of nectar-thickened chicken broth. Spouse at bedside. Spouse explains Pt not drinking broth, rather, she likes to smell broth for comfort. Patient's spouse takes mug of broth from Patient as SLP initiates session. Patient reports minimal appetite. She asks if she can be repositioned flat in bed. SLP educates Pt and spouse on safe swallow precautions and need noé remain upright for any PO intake. Patient labile, expressing interest in lying flat, and asking for nurse to help reposition her. Nurse notified. No additional questions noted. SLP discontinues session and explains she will re-attempt later service day pending Patient availability/scheudlign.   Upon second attempt later service day, Pt found with family at bedside. SLP attempts to assess Pt with dinner meal tray; however, Pt declining PO trials. Nurse aware. SLP reviews ongoing aspiration precautions and thickener guidelines with family and Spouse. Family and Spouse verbalize understanding. No further questions.     Assessment:     Kamla Coulter is a 57 y.o. female with an SLP diagnosis of Dysphagia.  She presents with risk of aspiration 2/2 respiratory status and strict aspiration precautions are advised.     Goals:    SLP Goals        Problem: SLP Goal    Goal Priority Disciplines Outcome   SLP Goal     SLP Ongoing (interventions implemented as appropriate)   Description:  Speech Language Pathology Goals  Goals expected to be met by 6/13  1. Pt will participate in ongoing swallow assessment to determine the least restrictive and safest po diet.                        Plan:     · Patient to be seen:  4 x/week   · Plan of Care expires:  07/05/18  · Plan of Care reviewed with:  patient, spouse   · SLP Follow-Up:  Yes       Discharge  recommendations:  LTACH  Barriers to Discharge:  Level of Skilled Assistance Needed     Time Tracking:     SLP Treatment Date:   06/08/18  Speech Start Time:  1437/15:32  Speech Stop Time:  1445 /15:34  Speech Total Time (min):  8 min/2 min    Billable Minutes: Treatment Swallowing Dysfunction 8     KUSH Pierson, [facility]-SLP  Speech-Language Pathology  Pager: [phone]      06/08/2018

## 2018-06-08 NOTE — PT/OT/SLP PROGRESS
Physical Therapy Treatment    Patient Name:  Kamla Coulter   MRN:  [id number]  Co-treat with OT    Recommendations:     Discharge Recommendations:  LTACH (long term acute care hospital)   Discharge Equipment Recommendations:  (TBD)   Barriers to discharge: Decreased caregiver support at current functional level     Assessment:     Kamla Coulter is a 57 y.o. female admitted with a medical diagnosis of Acute hypoxemic respiratory failure.  She presents with the following impairments/functional limitations:  weakness, impaired functional mobilty, gait instability, impaired self care skills, impaired endurance, impaired cardiopulmonary response to activity, pain, decreased lower extremity function, decreased upper extremity function, edema. Pt with decreased tolerance to activity today. Upon sitting EOB, pt's heart increased to 168. RN requested pt be returned to supine. HR decreased to the 140s after transfer.       Rehab Prognosis:  fair; patient would benefit from acute skilled PT services to address these deficits and reach maximum level of function.      Recent Surgery: * No surgery found *      Plan:     During this hospitalization, patient to be seen 4 x/week to address the above listed problems via gait training, therapeutic activities, therapeutic exercises, neuromuscular re-education  · Plan of Care Expires:  07/01/18   Plan of Care Reviewed with: patient, spouse    Subjective     Communicated with RN prior to session.  Patient found in bed upon PT entry to room, agreeable to treatment after max encouragement.      Chief Complaint: pain/SOB  Patient comments/goals: to get better   Pain/Comfort:  · Pain Rating 1: 7/10 (B feet)  · Pain Addressed 1: Distraction, Reposition, Nurse notified  · Pain Rating Post-Intervention 1: 7/10 (B feet; upper back)    Patients cultural, spiritual, Scientologist conflicts given the current situation: none reported     Objective:     Patient found with: telemetry, pulse  ox (continuous), blood pressure cuff, PICC line, bowel management system, mays catheter     General Precautions: Standard, fall, contact   Orthopedic Precautions:N/A   Braces: N/A     Functional Mobility:  · Bed Mobility:     · Rolling Right: total assistance  · Scooting: total assistance   · To EOB  · Supine up in bed   · Supine to Sit: total assistance and of 2 persons   · Pt's HR increased to 168 upon sitting EOB  · RN requested pt be returned to supine   · Sit to Supine: total assistance and of 2 persons  · Transfers: not performed   · Gait: not performed       AM-PAC 6 CLICK MOBILITY  Turning over in bed (including adjusting bedclothes, sheets and blankets)?: 2  Sitting down on and standing up from a chair with arms (e.g., wheelchair, bedside commode, etc.): 1  Moving from lying on back to sitting on the side of the bed?: 2  Moving to and from a bed to a chair (including a wheelchair)?: 1  Need to walk in hospital room?: 1  Climbing 3-5 steps with a railing?: 1  Total Score: 8       Therapeutic Activities and Exercises:  Educated pt on PT POC  Educated pt on upright positioning to assist with lung function  Educated pt on importance of OOB activity  OT educated pt and  on HEP  Pt verbalized understanding    Sitting EOB x 1 minute with total assist    Rolling to R to assist with fixing blue pad      Patient left HOB elevated with all lines intact, call button in reach, RN notified and  present..    GOALS:    Physical Therapy Goals        Problem: Physical Therapy Goal    Goal Priority Disciplines Outcome Goal Variances Interventions   Physical Therapy Goal     PT/OT, PT Ongoing (interventions implemented as appropriate)     Description:  Goals to be met by: 2018    Patient will increase functional independence with mobility by performin. Supine to sit with Moderate Assistance - not met  2. Sit to supine with Moderate Assistance - not met  3. Sit to stand transfer with Moderate  Assistance using Rolling Walker - not met  4. Bed to chair transfer with Moderate Assistance using Rolling Walker - not met  5. Gait  x 50 feet with Moderate Assistance using Rolling Walker. - not met  6. Sitting at edge of bed x5 minutes with Moderate Assistance - not met  7. Lower extremity exercise program x10 reps per handout, with independence - not emt                      Time Tracking:     PT Received On: 06/08/18  PT Start Time: 0928     PT Stop Time: 0951  PT Total Time (min): 23 min     Billable Minutes: Therapeutic Activity 10    Treatment Type: Treatment  PT/PTA: PT           Altagracia Colby, PT, DPT  6/8/2018  [phone]

## 2018-06-08 NOTE — ASSESSMENT & PLAN NOTE
-- CBC stable w/ improvement; Hgb 8.8 and no signs of blood loss  -- Haptoglobin > 250 at time of anemia re-assuring.

## 2018-06-08 NOTE — PLAN OF CARE
"Problem: Patient Care Overview  Goal: Plan of Care Review  Outcome: Ongoing (interventions implemented as appropriate)  Around 0530 pt stated "I can't breathe, I want the BiPAP." Pt's resp status and rate was consistent with what she had been majority of the shift, but O2 sat was 90%. RRT notified, and placed pt on BiPAP per PRN orders. See flowsheets for vital signs and assessments. See below for updates on progress made.       Neuro: AAOx4, moving extremities; slept intermittently    Cardiovascular: -120s; cheyanne gtt off for MAP > 65 off since 2026, SBP 120s-140s      Pulmonary: was on ComfortFlow 20L/50% until 0530, now on BiPAP 12/5 40%    Gastrointestinal: BM x1, flexiseal in place    Genitourinary: given 80mg lasix IVP and started on gtt, titrated for urine output >200 mL/hr; voided 2.3L; mays exchanged     Endocrine: K replaced x2, mag replaced x1, WBC 1.86    Integumentary/other: wound care per orders    Infusions: lasix    POC: diurese; wean BiPAP    Patient progressing towards goals as tolerated, plan of care communicated and reviewed with Kamla Coulter and  Kulwant at bedside. All concerns addressed. Will continue to monitor.         "

## 2018-06-08 NOTE — ASSESSMENT & PLAN NOTE
Improved. She was intubated on 5/28/2018 and bronchoscopy revealing mucus plugging of left main bronchial system & extubated on 5/29/2018 following repeat bronchoscopy.  Likely multifactorial with respiratory secretion, asthma exacerbation, and volume overload all likely interchangeably contributing.  Intubated 6/04 and bronch 6/05 with no mucous plugs, right sided thoracentesis 06/05, cultures sent. Extubated 6/6 to comfort flow; currently on 28L/40% FiO2: will continue to wean as appropriate  -- Completed course of abx for possible HAP.   -- Continue Solumedrol 80mg IV daily  -- Duonebs [id number] + 3% NS + cough assist device + use Aerobika with duonebs when appropriate.  -- Lasix gtt started on 06/07 for hypervolemia contributing to pt's symptoms; -2,545 in the past 24 hours and good U/O  -- CF w/ intermittent BiPAP use

## 2018-06-08 NOTE — ASSESSMENT & PLAN NOTE
-- Likely dependent edema > abdo wall hematoma  -- Continue to observe; non-tender on exam  -- Lasix gtt

## 2018-06-08 NOTE — ASSESSMENT & PLAN NOTE
-- likely due to voriconazole currently improving after switching to isavuconazonium  -- Improving; L>R conjunctival edema noted

## 2018-06-09 NOTE — ASSESSMENT & PLAN NOTE
Improved. She was intubated on 5/28/2018 and bronchoscopy revealing mucus plugging of left main bronchial system & extubated on 5/29/2018 following repeat bronchoscopy.  Likely multifactorial with respiratory secretion, asthma exacerbation, and volume overload all likely interchangeably contributing.  Intubated 6/04 and bronch 6/05 with no mucous plugs, right sided thoracentesis 06/05, cultures sent. Extubated 6/6 to comfort flow; currently on 28L/40% FiO2: will continue to wean as appropriate  -- Completed course of abx for possible HAP.   -- Continue Solumedrol 80mg IV daily  -- Duonebs [id number] + 3% NS + cough assist device + use Aerobika with duonebs when appropriate.  -- Lasix gtt started on 06/07 for hypervolemia contributing to pt's symptoms; -2,900 in the past 24 hours and good U/O; will titrate to 100 cc/hr output  -- CF w/ QHS BiPAP use

## 2018-06-09 NOTE — ASSESSMENT & PLAN NOTE
-- S/p voriconazole and steroid taper treated at [facility], [place] end of 2017. Complicated by vision loss  -- Continue isavuconazonium 372 mg PO qd per ID; possible contribution to patients rising LFTs but will continue as chronic medication and will follow labs closely

## 2018-06-09 NOTE — PROGRESS NOTES
Notified Md hendrickson vanc trough of 29. Ordered to hold the next dose.     Also notified of patient using accesory muscles while breathing, O2 sat maintained >93%. Will continue to monitor.

## 2018-06-09 NOTE — ASSESSMENT & PLAN NOTE
- Zyprexa PRN nightly for agitation and delirium.   - Continue Lexapro 5mg daily  - Xanex 0.25 BID PRN w/ improvement

## 2018-06-09 NOTE — PROGRESS NOTES
[facility]  Critical Care Medicine  Progress Note    Patient Name: Kamla Coulter  MRN: [id number]  Admission Date: 5/25/2018  Hospital Length of Stay: 15 days  Code Status: Full Code  Attending Provider: Rigo Zuñiga MD  Primary Care Provider: Molina Alexandre MD   Principal Problem: Acute hypoxemic respiratory failure    Subjective:     HPI:    This is a 57 year old female with hx of severe persistent asthma on Xolair, prolonged immunosuppression on steroids, ABPA,bronchiectasis, HTN, DVT, and GERD who is transferred from [facility] for higher level of care. She initially presented to the OSH on May second for progressive shortness of breath, wheezing and orthopnea over a period of 2-3 days associated with low-grade fever. She was admitted to the hospital medicine floor and started on IV solumedrol 40mg bid and was placed on BiPAP. She was weaned off to NC on the second day of admission. Blood cultures from admission grew MRSA bacteremia and patient was started on vancomycin. Urine and sputum cultures both grew MRSA. The patient had persistent MRSA bacteremia despite being on vancomycin and a SHEILA was done on 5/8/2018 that was negative for endocarditis and revealed normal EF%. A bone scan on 5/10/2018 was similarly negative for an infection nidus. On 5/10/2018 the patient developed an acute hypoxic respiratory failure that did not improve promptly on BiPAP with an FiO2 of 100% and the patient was moved to the ICU and started empirically on therapeutic Lovenox. A CTA done on the same day revealed no PE but was notable for worsening bilateral pleural effusion and a non-specific infiltrate/collection around the mid-esophagus. The collection was thought to be a loculated pleural effusion. IV solumedrol was increased to 80mg twice daily and Lovenox was decreased to ppx dosing. By 5/12/2018 the patient's respiratory failure had improved and patient was weaned off of  BiPAP to nasal cannula. Sputum cultures collected on 5/13/2018 showed budding yeast and the patient was started on voriconazole. On 5/15/2018 the patient had worsening hypoxia and increased work of breathing and a CXR showed HAP and Ceftaroline was added to vancomycin. On 5/16/2018, repeat blood cultures showed persistent MRSA bacteremia and an Indium scan performed on 5/18/2018 revealed significant uptake by a collection located adjacent to the mid-esophagus. The patient was electively intubated on 5/22/2018 and an EGD revealed a whitish plaque at the proximal esophagus that was biopsied and resulted negative for malignant changes. Similarly, on the same day, an EBUS was performed and showed fluid collection posterior to the distal trachea that was better assessed through 5 FNA passes. Cultures from the EBUS grew MRSA + Alcaligenes/Achromobcater Xylosoxidens. However, FNA was negative for any malignancies. The patient was extubated the same day. Two days later (5/24/2018), the patient developed an acute decompensated respiratory failure with oxygen saturations dropping to the 60's and the patient was emergently intubated. CXR showed left lower lobe atelectasis. An emergent bronchoscopy revealed a mucus plug in the left mainstem bronchus. The mucus plug was brown, thick, and difficult to suction raising suspicion for Aspergillus infection. Following mucus plug disimpaction, the patient's respiratory status recovered immediately and the patient was extubated the next morning (5/25/2018). Prior to that a repeat CT chest showed persistent collection around the mid-esophagus. At this point, it was decided that the patient was better served at a higher level of care facility and the patient was transferred to [facility]. The patient arrived on BiPAP 10/5 and a 45% FiO2 with good oxygen saturation.       Hospital/ICU Course:  Admitted as a transfer from [facility] on 5/25/2018 for higher level of care. Patient required  BiPAP on admission but was able to wean to HFNC on second day of hospitalization. ID was consulted and had made changes to antimicrobials with continuing vancomycin, switching voriconazole to isavuconazonium and zosyn to meropenem. Blood cultures showed NGTD.  05/28/2018 Worsening respiratory status requiring intubation morning of 5/28/2018 for oxygen saturation in the lower 70's with emergent bronchoscopy done following intubation revealing left system mucus plugging. Following suctioning of mucus plugging oxygen saturation had finally improved. Repeat CT chest did not show mediastinal collection.    05/29/2018 drop in H/H requiring two units of pRBC. Heparin held. Plan for extubation today. Continuing diuresis and management of resp secretions. CT abdomen revealed no intraabdominal source of bleeding. Patient had repeat bronchoscopy and extubated to [facility].    05/30/2018 resuming heparin gtt and weaning oxygen requirement prn. Delirious overnight.       06/01/2018 NG removed by patient overnight. Patient with SLP repeat eval this am. Continue weaning off oxygen.     06/02/2018 CTA negative for PE and heparin held. Speech eval today.     06/03/2018 continue supportive care and weaning oxygen requirement and steroids.     06/04/2018 No acute events overnight.  Patient  C/o increased anxiety overnight and still on CPAP w/ PEEP 5 & FiO2 40%, intubated for airway protection    06/05 Bronch negative for mucous plusg    06/06 Extubated   06/07 Lasix gtt for diuresis. Esmolol d/c'ed due to hypotension; Christian drip started transiently and off at 20:26  06/08 SBP improved. LFTs trending up and HR 120s  06/09 Increasing verapamil. Pt w/ more LE pain. US LE. BiPAP at night and CF during the day.    Interval History/Significant Events: Patient on comfort flow 20L/50% FiO2 after BiPAP overnight. Diuresis goal of 100cc/hr as BUN and sCr slowly trending up but patient still appears to be volume up. LFTs trending up possible 2/2  chronic isaniconazonium use. Will monitor. L sided mild abdominal pain/discomfort noted; simethicone ordered.    Review of Systems   Constitutional: Positive for fatigue. Negative for fever.   HENT: Negative.    Respiratory: Positive for shortness of breath (improving/stable).    Gastrointestinal: Positive for abdominal distention. Negative for abdominal pain.   Genitourinary: Negative.    Musculoskeletal:        Heel pain     Objective:     Vital Signs (Most Recent):  Temp: 97.7 °F (36.5 °C) (06/09/18 0300)  Pulse: (!) 135 (06/09/18 0900)  Resp: (!) 27 (06/09/18 0900)  BP: (!) 124/59 (06/09/18 0900)  SpO2: (!) 94 % (06/09/18 0900) Vital Signs (24h Range):  Temp:  [97.7 °F (36.5 °C)-98.7 °F (37.1 °C)] 97.7 °F (36.5 °C)  Pulse:  [113-142] 135  Resp:  [16-50] 27  SpO2:  [88 %-98 %] 94 %  BP: (113-172)/(59-91) 124/59   Weight: 81.4 kg (179 lb 7.3 oz)  Body mass index is 32.82 kg/m².      Intake/Output Summary (Last 24 hours) at 06/09/18 0947  Last data filed at 06/09/18 0933   Gross per 24 hour   Intake          1155.47 ml   Output             3785 ml   Net         -2629.53 ml       Physical Exam   Constitutional: She is oriented to person, place, and time. She appears well-developed. She appears ill. No distress.   On comfort flow, tachypneic but appears to not be in severe distress   HENT:   Head: Normocephalic and atraumatic.   Eyes: EOM are normal. Pupils are equal, round, and reactive to light.   Conjunctival edema    Cardiovascular: Regular rhythm and intact distal pulses.  Tachycardia present.  Exam reveals no gallop and no friction rub.    No murmur heard.  Pulmonary/Chest: No respiratory distress. She has decreased breath sounds (BL lower lung fields). She has no wheezes. She has rhonchi (BL diffuse). She has no rales.   Abdominal: Soft. Bowel sounds are normal. She exhibits no distension. There is no tenderness.   Abdominal discomfort, LLQ/LUQ, possibly gas pain   Musculoskeletal: She exhibits edema (mildly  improved).   Neurological: She is alert and oriented to person, place, and time.   [id number]   Skin: Skin is warm.   Notes skin sloughing in b/l upper extremities, L medial abdomen   Psychiatric: Her mood appears anxious.         Lines/Drains/Airways     Peripherally Inserted Central Catheter Line                 PICC Double Lumen 05/24/18 right brachial 16 days          Drain                 Rectal Tube 06/05/18 0600 rectal tube w/ balloon (indicate number of mLs) 4 days         Urethral Catheter 06/07/18 2032 1 day              Significant Labs:    CBC/Anemia Profile:    Recent Labs  Lab 06/08/18  0500 06/08/18  1545 06/09/18  0306   WBC 1.86* 1.40* 1.29*   HGB 8.8* 8.3* 7.8*   HCT 27.0* 25.5* 24.3*   * 87* 86*   MCV 93 93 93   RDW 19.8* 19.9* 19.5*        Chemistries:    Recent Labs  Lab 06/08/18  0006 06/08/18  0500 06/08/18  1200 06/09/18  0306    141  --  144   K 3.5 3.5 4.0 2.8*    103  --  103   CO2 25 28  --  30*   BUN 13 14  --  14   CREATININE 0.7 0.6  --  0.9   CALCIUM 7.1* 7.4*  --  7.1*   ALBUMIN  --  1.7*  --  1.5*   PROT  --  4.8*  --  4.5*   BILITOT  --  0.5  --  0.4   ALKPHOS  --  262*  --  269*   ALT  --  142*  --  173*   AST  --  118*  --  139*   MG 1.7 2.3  --  1.6   PHOS 3.2 3.0  --  2.8       ABGs:   No results for input(s): PH, PCO2, HCO3, POCSATURATED, BE in the last 48 hours.    Significant Imaging:  CXR: I have reviewed all pertinent results/findings within the past 24 hours and my personal findings are:  No significant interval change from 6/4    Assessment/Plan:     Neuro   Delirium    -- Likely due to prolonged ICU stay and receiving benzodiazepines (Xanax).  -- PRN Zyprexa nightly for delirium and agitation.  -- Off of all sedation; A&O X 4        Psychiatric   Generalized anxiety disorder    - Zyprexa PRN nightly for agitation and delirium.   - Continue Lexapro 5mg daily  - Xanex 0.25 BID PRN w/ improvement         Ophtho   Conjunctival edema of both eyes    -- likely  due to voriconazole currently improving after switching to isavuconazonium  -- Improving; L>R conjunctival edema noted         Derm   Alteration in skin integrity    - Wound care following; appreciate recommendations  - Buttock and UE skin sloughing noted; noted R sided medial abdomen sloughing as well         Pulmonary   * Acute hypoxemic respiratory failure    Improved. She was intubated on 5/28/2018 and bronchoscopy revealing mucus plugging of left main bronchial system & extubated on 5/29/2018 following repeat bronchoscopy.  Likely multifactorial with respiratory secretion, asthma exacerbation, and volume overload all likely interchangeably contributing.  Intubated 6/04 and bronch 6/05 with no mucous plugs, right sided thoracentesis 06/05, cultures sent. Extubated 6/6 to comfort flow; currently on 28L/40% FiO2: will continue to wean as appropriate  -- Completed course of abx for possible HAP.   -- Continue Solumedrol 80mg IV daily  -- Duonebs [id number] + 3% NS + cough assist device + use Aerobika with duonebs when appropriate.  -- Lasix gtt started on 06/07 for hypervolemia contributing to pt's symptoms; -2,900 in the past 24 hours and good U/O; will titrate to 100 cc/hr output  -- CF w/ QHS BiPAP use            Atelectasis    -- stable  -- continue home vest during the day        Severe persistent asthma    -- Stable  -- On Xolair and prednisone taper at home  -- Continue solumedrol 80 mg IV daily.   -- Continue home spiriva, breo, and singulair.        Cardiac/Vascular   Mixed hyperlipidemia    -- Continue home Pravastatin 80 mg qd        Essential hypertension    --Verapamil restarted at 120 mg  QD; increase to 180 mg in 06/10        Renal/   Metabolic alkalosis    -- Was likely 2/2 to volume contraction alkalosis        ID   MRSA bacteremia    MRSA grew initially in urine, then blood, and finally in mediastinal collection. SHEILA performed 5/8/2018 was negative for endocarditis.  Hx of prolonged  immunosuppression on Prednisone and Xolair predisposing patient to fungal infection. Bronchial washing on 5/22/2018 grew MRSA.  -- Continue IV Vancomycin for total of 6 weeks per ID beginning 5/25/2018 and follow up in ID clinic with weekly labs( estimate end date July 6)  -- Change Vanc dose from 1250 mg to 1750 mg IV BID  -- Continue Dapsone 100 mg po qd indefinitely        ABPA (allergic bronchopulmonary aspergillosis)    -- S/p voriconazole and steroid taper treated at [facility] end of 2017. Complicated by vision loss  -- Continue isavuconazonium 372 mg PO qd per ID; possible contribution to patients rising LFTs but will continue as chronic medication and will follow labs closely         Oncology   Acute blood loss anemia    -- Hgb stable w/ mild drop  -- No overt source of bleeding noted        Hereditary spherocytosis    -- CBC stable w/ improvement; Hgb 7.8 and no signs of blood loss  -- Haptoglobin > 250 at time of anemia re-assuring  -- Anticoagulation for DVT PPx started  -- US b/l LE to r/o DVT        Endocrine   Impaired glucose tolerance    -- Likely due to being on steroids chronically; now well controlled  -- A1c 5.2, good poct glucose measurements earlier in admission.         GI   Abdominal wall fluid collections    -- Likely dependent edema > abdo wall hematoma  -- Continue to observe; non-tender on exam  -- Lasix gtt; titrate to 100 cc/hr output        Abnormal LFTs    -- likely due to drug adverse effect suspected from voriconazole. Switched to isavuconazonium in setting of conjunctival swelling now with improving LFT's.   -- LFT's w/ mild increase in the past couple of days - could be the combination of episodic hypotension yesterday prior to esmolol discontinuation vs. Chronic use of isaniconazonium for aspergillosis   -- Will continue to closely monitor         GERD (gastroesophageal reflux disease)    -- Stable  -- Continue famotidine            Critical Care Daily Checklist:     A:  Awake: RASS Goal/Actual Goal: RASS Goal: 0-->alert and calm  Actual: Ricks Agitation Sedation Scale (RASS): Alert and calm   B: Spontaneous Breathing Trial Performed? Spon. Breathing Trial Initiated?: Initiated (06/06/18 0810)   C: SAT & SBT Coordinated?  Extubated                      D: Delirium: CAM-ICU Overall CAM-ICU: Negative   E: Early Mobility Performed? YES   F: Feeding Goal: Goals: Meet % EEN, EPN  Status: Nutrition Goal Status: goal not met             Current Diet Order   Procedures    Diet Dysphagia Pureed (IDDSI Level 4) [facility]; [place] Thick       Order Specific Question:   Indicate patient location for additional diet options:       Answer:   [facility]       Order Specific Question:   Fluid consistency:       Answer:   [place]ect[place] Thick       AS: Analgesia/Sedation N/A   T: Thromboembolic Prophylaxis N/A   H: HOB > 300 YES   U: Stress Ulcer Prophylaxis (if needed) PPI PO   G: Glucose Control N/A   B: Bowel Function Stool Occurrence: 0   I: Indwelling Catheter (Lines & Olivares) Necessity Olivares  FlexiSeal  PICC   D: De-escalation of Antimicrobials/Pharmacotherapies N/A - 6 weeks total of Vanc as recommended by ID     Plan for the day/ETD Weaning off of O2; PO diet and diuresis      Code Status:  Family/Goals of Care: Full Code           Critical care was time spent personally by me on the following activities: development of treatment plan with patient or surrogate and bedside caregivers, discussions with consultants, evaluation of patient's response to treatment, examination of patient, ordering and performing treatments and interventions, ordering and review of laboratory studies, ordering and review of radiographic studies, pulse oximetry, re-evaluation of patient's condition. This critical care time did not overlap with that of any other provider or involve time for any procedures.     Carmella Sharif MD  Critical Care Medicine  [facility]

## 2018-06-09 NOTE — PLAN OF CARE
Problem: Patient Care Overview  Goal: Plan of Care Review  Outcome: Ongoing (interventions implemented as appropriate)  See vital signs and assessments in flowsheets. See below for updates on today's progress.     Pulmonary: comfort flow to BIPAP, O2 sat >95%    Cardiovascular: -130's. bp systolic 's     Neurological: AAO, afebrile     Gastrointestinal: BM today    Genitourinary: UOP >100ml/hr    Endocrine: normoglycemic     Integumentary/Other: replaced K on shift.     Infusions: lasix    Patient progressing towards goals as tolerated, plan of care communicated and reviewed with patient and family. All concerns addressed. Will continue to monitor.

## 2018-06-09 NOTE — NURSING
CC team notified of critical WBC count this am of 1.29, order for another troponin added on to am labs.

## 2018-06-09 NOTE — SUBJECTIVE & OBJECTIVE
Interval History/Significant Events: Patient on comfort flow 20L/50% FiO2 after BiPAP overnight. Diuresis goal of 100cc/hr as BUN and sCr slowly trending up but patient still appears to be volume up. LFTs trending up possible 2/2 chronic isaniconazonium use. Will monitor. L sided mild abdominal pain/discomfort noted; simethicone ordered.    Review of Systems   Constitutional: Positive for fatigue. Negative for fever.   HENT: Negative.    Respiratory: Positive for shortness of breath (improving/stable).    Gastrointestinal: Positive for abdominal distention. Negative for abdominal pain.   Genitourinary: Negative.    Musculoskeletal:        Heel pain     Objective:     Vital Signs (Most Recent):  Temp: 97.7 °F (36.5 °C) (06/09/18 0300)  Pulse: (!) 135 (06/09/18 0900)  Resp: (!) 27 (06/09/18 0900)  BP: (!) 124/59 (06/09/18 0900)  SpO2: (!) 94 % (06/09/18 0900) Vital Signs (24h Range):  Temp:  [97.7 °F (36.5 °C)-98.7 °F (37.1 °C)] 97.7 °F (36.5 °C)  Pulse:  [113-142] 135  Resp:  [16-50] 27  SpO2:  [88 %-98 %] 94 %  BP: (113-172)/(59-91) 124/59   Weight: 81.4 kg (179 lb 7.3 oz)  Body mass index is 32.82 kg/m².      Intake/Output Summary (Last 24 hours) at 06/09/18 0947  Last data filed at 06/09/18 0933   Gross per 24 hour   Intake          1155.47 ml   Output             3785 ml   Net         -2629.53 ml       Physical Exam   Constitutional: She is oriented to person, place, and time. She appears well-developed. She appears ill. No distress.   On comfort flow, tachypneic but appears to not be in severe distress   HENT:   Head: Normocephalic and atraumatic.   Eyes: EOM are normal. Pupils are equal, round, and reactive to light.   Conjunctival edema    Cardiovascular: Regular rhythm and intact distal pulses.  Tachycardia present.  Exam reveals no gallop and no friction rub.    No murmur heard.  Pulmonary/Chest: No respiratory distress. She has decreased breath sounds (BL lower lung fields). She has no wheezes. She has  rhonchi (BL diffuse). She has no rales.   Abdominal: Soft. Bowel sounds are normal. She exhibits no distension. There is no tenderness.   Abdominal discomfort, LLQ/LUQ, possibly gas pain   Musculoskeletal: She exhibits edema (mildly improved).   Neurological: She is alert and oriented to person, place, and time.   [id number]   Skin: Skin is warm.   Notes skin sloughing in b/l upper extremities, L medial abdomen   Psychiatric: Her mood appears anxious.         Lines/Drains/Airways     Peripherally Inserted Central Catheter Line                 PICC Double Lumen 05/24/18 right brachial 16 days          Drain                 Rectal Tube 06/05/18 0600 rectal tube w/ balloon (indicate number of mLs) 4 days         Urethral Catheter 06/07/18 2032 1 day              Significant Labs:    CBC/Anemia Profile:    Recent Labs  Lab 06/08/18  0500 06/08/18  1545 06/09/18  0306   WBC 1.86* 1.40* 1.29*   HGB 8.8* 8.3* 7.8*   HCT 27.0* 25.5* 24.3*   * 87* 86*   MCV 93 93 93   RDW 19.8* 19.9* 19.5*        Chemistries:    Recent Labs  Lab 06/08/18  0006 06/08/18  0500 06/08/18  1200 06/09/18  0306    141  --  144   K 3.5 3.5 4.0 2.8*    103  --  103   CO2 25 28  --  30*   BUN 13 14  --  14   CREATININE 0.7 0.6  --  0.9   CALCIUM 7.1* 7.4*  --  7.1*   ALBUMIN  --  1.7*  --  1.5*   PROT  --  4.8*  --  4.5*   BILITOT  --  0.5  --  0.4   ALKPHOS  --  262*  --  269*   ALT  --  142*  --  173*   AST  --  118*  --  139*   MG 1.7 2.3  --  1.6   PHOS 3.2 3.0  --  2.8       ABGs:   No results for input(s): PH, PCO2, HCO3, POCSATURATED, BE in the last 48 hours.    Significant Imaging:  CXR: I have reviewed all pertinent results/findings within the past 24 hours and my personal findings are:  No significant interval change from 6/4

## 2018-06-09 NOTE — PROGRESS NOTES
Spoke with MD Eriberto regarding simethicone order placed. Verbal order to continue suspension and discontinue PO order that was placed.

## 2018-06-09 NOTE — ASSESSMENT & PLAN NOTE
-- Likely dependent edema > abdo wall hematoma  -- Continue to observe; non-tender on exam  -- Lasix gtt; titrate to 100 cc/hr output

## 2018-06-09 NOTE — ASSESSMENT & PLAN NOTE
-- CBC stable w/ improvement; Hgb 7.8 and no signs of blood loss  -- Haptoglobin > 250 at time of anemia re-assuring  -- Anticoagulation for DVT PPx started  -- US b/l LE to r/o DVT

## 2018-06-10 PROBLEM — Z71.89 GOALS OF CARE, COUNSELING/DISCUSSION: Status: ACTIVE | Noted: 2018-01-01

## 2018-06-10 NOTE — PROGRESS NOTES
MD Zuñiga at bedside. Patient stated did not want to be intubated multiple times while at bedside. Sat in the low 80's. Md Zuñiga made aware and Rt aware. Will continue to monitor.

## 2018-06-10 NOTE — SUBJECTIVE & OBJECTIVE
Interval History/Significant Events: Patient on continuous BiPAP. Diuresing well and sCr stable. Patient looks more uncomfortable today overall; lengthy discussion w/ Dr. Zuñiga and patient/family at bedside - patient elected to be DNR/DNI. Morphine PRN for dyspnea    Review of Systems   Constitutional: Positive for fatigue. Negative for fever.   HENT: Negative.    Respiratory: Positive for shortness of breath (improving/stable).    Gastrointestinal: Positive for abdominal distention. Negative for abdominal pain.   Genitourinary: Negative.    Musculoskeletal:        Heel pain     Objective:     Vital Signs (Most Recent):  Temp: 98.5 °F (36.9 °C) (06/10/18 0700)  Pulse: (!) 126 (06/10/18 1030)  Resp: (!) 47 (06/10/18 1030)  BP: (!) 114/57 (06/10/18 1030)  SpO2: 98 % (06/10/18 1030) Vital Signs (24h Range):  Temp:  [97.9 °F (36.6 °C)-99 °F (37.2 °C)] 98.5 °F (36.9 °C)  Pulse:  [110-137] 126  Resp:  [20-50] 47  SpO2:  [83 %-100 %] 98 %  BP: (100-148)/(57-99) 114/57   Weight: 81.4 kg (179 lb 7.3 oz)  Body mass index is 32.82 kg/m².      Intake/Output Summary (Last 24 hours) at 06/10/18 1040  Last data filed at 06/10/18 1000   Gross per 24 hour   Intake          1182.98 ml   Output             2545 ml   Net         -1362.02 ml       Physical Exam   Constitutional: She is oriented to person, place, and time. She appears well-developed. She appears ill. No distress.   On BiPAP, appears un-comfortable and in distress, dyspneic    HENT:   Head: Normocephalic and atraumatic.   Eyes: EOM are normal. Pupils are equal, round, and reactive to light.   Conjunctival edema    Cardiovascular: Regular rhythm and intact distal pulses.  Tachycardia present.  Exam reveals no gallop and no friction rub.    No murmur heard.  Tachycardic    Pulmonary/Chest: No respiratory distress. She has decreased breath sounds (BL lower lung fields). She has wheezes. She has rhonchi (BL diffuse). She has rales. She exhibits no tenderness.   Dyspneic     Abdominal: Soft. Bowel sounds are normal. She exhibits no distension. There is no tenderness.   Abdominal discomfort improved   Musculoskeletal: She exhibits edema (mildly improved).   Neurological: She is alert and oriented to person, place, and time.   E4VTM6   Skin: Skin is warm.   Notes skin sloughing in b/l upper extremities, L medial abdomen   Psychiatric: Her mood appears anxious.         Lines/Drains/Airways     Peripherally Inserted Central Catheter Line                 PICC Double Lumen 05/24/18 right brachial 17 days          Drain                 Urethral Catheter 06/07/18 2032 2 days         Rectal Tube 06/09/18 1200 less than 1 day              Significant Labs:    CBC/Anemia Profile:    Recent Labs  Lab 06/09/18  0306 06/09/18  1612 06/10/18  0220   WBC 1.29* 1.28* 1.46*   HGB 7.8* 8.0* 7.8*   HCT 24.3* 25.5* 24.5*   PLT 86* 85* 86*   MCV 93 95 94   RDW 19.5* 19.9* 19.4*        Chemistries:    Recent Labs  Lab 06/09/18  0306 06/09/18  1152 06/09/18  1612 06/10/18  0220     --  142 143   K 2.8* 4.1 3.9 3.4*     --  102 100   CO2 30*  --  27 32*   BUN 14  --  17 17   CREATININE 0.9  --  0.9 0.8   CALCIUM 7.1*  --  7.2* 7.4*   ALBUMIN 1.5*  --   --  1.5*   PROT 4.5*  --   --  4.7*   BILITOT 0.4  --   --  0.6   ALKPHOS 269*  --   --  295*   *  --   --  187*   *  --   --  154*   MG 1.6  --   --  1.5*   PHOS 2.8  --   --  2.2*       ABGs:   No results for input(s): PH, PCO2, HCO3, POCSATURATED, BE in the last 48 hours.    Significant Imaging:  CXR: I have reviewed all pertinent results/findings within the past 24 hours and my personal findings are:  No significant interval change from 6/4

## 2018-06-10 NOTE — PROGRESS NOTES
RN and Charge Nurse talked with patient about cleaning her up and changing sheets/linens.  Patient became extremely anxious and insisted on changing over to comfort flow NC.  RNs tried to encourage patient to keep Bipap in place for patient's mini bath but patient insisted on changing over.  During brief change from Bipap to Comfort flow, patient's O2 sat dropped to 56%.  RT and CC team called to bedside.  RT attempted comfort flow in addition to Bipap but patient's sats not really improving.  Dr. Kothari talked with family about intubation - family verbalized understanding.  Patient, however, insisted on not being intubated.  Family attempted to talk with patient but patient adamantly refused to be intubated.    Slowly patient's sats are increasing.   Will continue to monitor extremely closely.

## 2018-06-10 NOTE — PROGRESS NOTES
[facility]  Critical Care Medicine  Progress Note    Patient Name: Kamla Coulter  MRN: [id number]  Admission Date: 5/25/2018  Hospital Length of Stay: 16 days  Code Status: DNR  Attending Provider: Rigo Zuñiga MD  Primary Care Provider: Molina Alexandre MD   Principal Problem: Acute hypoxemic respiratory failure    Subjective:     HPI:    This is a 57 year old female with hx of severe persistent asthma on Xolair, prolonged immunosuppression on steroids, ABPA,bronchiectasis, HTN, DVT, and GERD who is transferred from [facility] for higher level of care. She initially presented to the OSH on May second for progressive shortness of breath, wheezing and orthopnea over a period of 2-3 days associated with low-grade fever. She was admitted to the hospital medicine floor and started on IV solumedrol 40mg bid and was placed on BiPAP. She was weaned off to NC on the second day of admission. Blood cultures from admission grew MRSA bacteremia and patient was started on vancomycin. Urine and sputum cultures both grew MRSA. The patient had persistent MRSA bacteremia despite being on vancomycin and a SHEILA was done on 5/8/2018 that was negative for endocarditis and revealed normal EF%. A bone scan on 5/10/2018 was similarly negative for an infection nidus. On 5/10/2018 the patient developed an acute hypoxic respiratory failure that did not improve promptly on BiPAP with an FiO2 of 100% and the patient was moved to the ICU and started empirically on therapeutic Lovenox. A CTA done on the same day revealed no PE but was notable for worsening bilateral pleural effusion and a non-specific infiltrate/collection around the mid-esophagus. The collection was thought to be a loculated pleural effusion. IV solumedrol was increased to 80mg twice daily and Lovenox was decreased to ppx dosing. By 5/12/2018 the patient's respiratory failure had improved and patient was weaned off of BiPAP to  nasal cannula. Sputum cultures collected on 5/13/2018 showed budding yeast and the patient was started on voriconazole. On 5/15/2018 the patient had worsening hypoxia and increased work of breathing and a CXR showed HAP and Ceftaroline was added to vancomycin. On 5/16/2018, repeat blood cultures showed persistent MRSA bacteremia and an Indium scan performed on 5/18/2018 revealed significant uptake by a collection located adjacent to the mid-esophagus. The patient was electively intubated on 5/22/2018 and an EGD revealed a whitish plaque at the proximal esophagus that was biopsied and resulted negative for malignant changes. Similarly, on the same day, an EBUS was performed and showed fluid collection posterior to the distal trachea that was better assessed through 5 FNA passes. Cultures from the EBUS grew MRSA + Alcaligenes/Achromobcater Xylosoxidens. However, FNA was negative for any malignancies. The patient was extubated the same day. Two days later (5/24/2018), the patient developed an acute decompensated respiratory failure with oxygen saturations dropping to the 60's and the patient was emergently intubated. CXR showed left lower lobe atelectasis. An emergent bronchoscopy revealed a mucus plug in the left mainstem bronchus. The mucus plug was brown, thick, and difficult to suction raising suspicion for Aspergillus infection. Following mucus plug disimpaction, the patient's respiratory status recovered immediately and the patient was extubated the next morning (5/25/2018). Prior to that a repeat CT chest showed persistent collection around the mid-esophagus. At this point, it was decided that the patient was better served at a higher level of care facility and the patient was transferred to [facility]. The patient arrived on BiPAP 10/5 and a 45% FiO2 with good oxygen saturation.       Hospital/ICU Course:  Admitted as a transfer from [facility] on 5/25/2018 for higher level of care. Patient required BiPAP on  admission but was able to wean to HFNC on second day of hospitalization. ID was consulted and had made changes to antimicrobials with continuing vancomycin, switching voriconazole to isavuconazonium and zosyn to meropenem. Blood cultures showed NGTD.  05/28/2018 Worsening respiratory status requiring intubation morning of 5/28/2018 for oxygen saturation in the lower 70's with emergent bronchoscopy done following intubation revealing left system mucus plugging. Following suctioning of mucus plugging oxygen saturation had finally improved. Repeat CT chest did not show mediastinal collection.    05/29/2018 drop in H/H requiring two units of pRBC. Heparin held. Plan for extubation today. Continuing diuresis and management of resp secretions. CT abdomen revealed no intraabdominal source of bleeding. Patient had repeat bronchoscopy and extubated to [facility].    05/30/2018 resuming heparin gtt and weaning oxygen requirement prn. Delirious overnight.       06/01/2018 NG removed by patient overnight. Patient with SLP repeat eval this am. Continue weaning off oxygen.     06/02/2018 CTA negative for PE and heparin held. Speech eval today.     06/03/2018 continue supportive care and weaning oxygen requirement and steroids.     06/04/2018 No acute events overnight.  Patient  C/o increased anxiety overnight and still on CPAP w/ PEEP 5 & FiO2 40%, intubated for airway protection    06/05 Bronch negative for mucous plusg    06/06 Extubated   06/07 Lasix gtt for diuresis. Esmolol d/c'ed due to hypotension; Christian drip started transiently and off at 20:26  06/08 SBP improved. LFTs trending up and HR 120s  06/09 Increasing verapamil. Pt w/ more LE pain. US LE. BiPAP at night and CF during the day.  06/10 Patient with several episodes of desaturation overnight 2/2 mucus plugging. After a lengthy discussion w/ patient and family, patient chose to be DNR at this time.     Interval History/Significant Events: Patient on continuous BiPAP.  Diuresing well and sCr stable. Patient looks more uncomfortable today overall; lengthy discussion w/ Dr. Zuñiga and patient/family at bedside - patient elected to be DNR/DNI. Morphine PRN for dyspnea    Review of Systems   Constitutional: Positive for fatigue. Negative for fever.   HENT: Negative.    Respiratory: Positive for shortness of breath (improving/stable).    Gastrointestinal: Positive for abdominal distention. Negative for abdominal pain.   Genitourinary: Negative.    Musculoskeletal:        Heel pain     Objective:     Vital Signs (Most Recent):  Temp: 98.5 °F (36.9 °C) (06/10/18 0700)  Pulse: (!) 126 (06/10/18 1030)  Resp: (!) 47 (06/10/18 1030)  BP: (!) 114/57 (06/10/18 1030)  SpO2: 98 % (06/10/18 1030) Vital Signs (24h Range):  Temp:  [97.9 °F (36.6 °C)-99 °F (37.2 °C)] 98.5 °F (36.9 °C)  Pulse:  [110-137] 126  Resp:  [20-50] 47  SpO2:  [83 %-100 %] 98 %  BP: (100-148)/(57-99) 114/57   Weight: 81.4 kg (179 lb 7.3 oz)  Body mass index is 32.82 kg/m².      Intake/Output Summary (Last 24 hours) at 06/10/18 1040  Last data filed at 06/10/18 1000   Gross per 24 hour   Intake          1182.98 ml   Output             2545 ml   Net         -1362.02 ml       Physical Exam   Constitutional: She is oriented to person, place, and time. She appears well-developed. She appears ill. No distress.   On BiPAP, appears un-comfortable and in distress, dyspneic    HENT:   Head: Normocephalic and atraumatic.   Eyes: EOM are normal. Pupils are equal, round, and reactive to light.   Conjunctival edema    Cardiovascular: Regular rhythm and intact distal pulses.  Tachycardia present.  Exam reveals no gallop and no friction rub.    No murmur heard.  Tachycardic    Pulmonary/Chest: No respiratory distress. She has decreased breath sounds (BL lower lung fields). She has wheezes. She has rhonchi (BL diffuse). She has rales. She exhibits no tenderness.   Dyspneic    Abdominal: Soft. Bowel sounds are normal. She exhibits no distension.  There is no tenderness.   Abdominal discomfort improved   Musculoskeletal: She exhibits edema (mildly improved).   Neurological: She is alert and oriented to person, place, and time.   E4VTM6   Skin: Skin is warm.   Notes skin sloughing in b/l upper extremities, L medial abdomen   Psychiatric: Her mood appears anxious.         Lines/Drains/Airways     Peripherally Inserted Central Catheter Line                 PICC Double Lumen 05/24/18 right brachial 17 days          Drain                 Urethral Catheter 06/07/18 2032 2 days         Rectal Tube 06/09/18 1200 less than 1 day              Significant Labs:    CBC/Anemia Profile:    Recent Labs  Lab 06/09/18  0306 06/09/18  1612 06/10/18  0220   WBC 1.29* 1.28* 1.46*   HGB 7.8* 8.0* 7.8*   HCT 24.3* 25.5* 24.5*   PLT 86* 85* 86*   MCV 93 95 94   RDW 19.5* 19.9* 19.4*        Chemistries:    Recent Labs  Lab 06/09/18  0306 06/09/18  1152 06/09/18  1612 06/10/18  0220     --  142 143   K 2.8* 4.1 3.9 3.4*     --  102 100   CO2 30*  --  27 32*   BUN 14  --  17 17   CREATININE 0.9  --  0.9 0.8   CALCIUM 7.1*  --  7.2* 7.4*   ALBUMIN 1.5*  --   --  1.5*   PROT 4.5*  --   --  4.7*   BILITOT 0.4  --   --  0.6   ALKPHOS 269*  --   --  295*   *  --   --  187*   *  --   --  154*   MG 1.6  --   --  1.5*   PHOS 2.8  --   --  2.2*       ABGs:   No results for input(s): PH, PCO2, HCO3, POCSATURATED, BE in the last 48 hours.    Significant Imaging:  CXR: I have reviewed all pertinent results/findings within the past 24 hours and my personal findings are:  No significant interval change from 6/4    Assessment/Plan:     Neuro   Delirium    -- Likely due to prolonged ICU stay and receiving benzodiazepines (Xanax).  -- PRN Zyprexa nightly for delirium and agitation.  -- Off of all sedation; A&O X 4        Psychiatric   Generalized anxiety disorder    - Zyprexa PRN nightly for agitation and delirium.   - Continue Lexapro 5mg daily  - Xanex 0.25 BID PRN w/  improvement         Ophtho   Conjunctival edema of both eyes    -- likely due to voriconazole currently improving after switching to isavuconazonium  -- Improving; L>R conjunctival edema noted         Derm   Alteration in skin integrity    - Wound care following; appreciate recommendations  - Buttock and UE skin sloughing noted; noted R sided medial abdomen sloughing as well         Pulmonary   * Acute hypoxemic respiratory failure    Improved. She was intubated on 5/28/2018 and bronchoscopy revealing mucus plugging of left main bronchial system & extubated on 5/29/2018 following repeat bronchoscopy.  Likely multifactorial with respiratory secretion, asthma exacerbation, and volume overload all likely interchangeably contributing.  Intubated 6/04 and bronch 6/05 with no mucous plugs, right sided thoracentesis 06/05, cultures sent. Extubated 6/6 to comfort flow; currently on 28L/40% FiO2: will continue to wean as appropriate  -- Completed course of abx for possible HAP.   -- Continue Solumedrol 80mg IV daily  -- Duonebs [id number] + 3% NS + cough assist device + use Aerobika with duonebs when appropriate.  -- Lasix gtt started on 06/07 for hypervolemia contributing to pt's symptoms; -2,900 in the past 24 hours and good U/O; will titrate to 100 cc/hr output  -- Currently on continuous BiPAP w/ worsening respiratory status and multiple episodes of desaturation overnight/AM 2/2 mucous plugging  -- Now DNR/DNI  -- Morphine 2 mg IV Q1Hr PRN for dyspnea          Atelectasis    -- continue home vest during the day if patient able to participate        Severe persistent asthma    -- On Xolair and prednisone taper at home  -- Continue solumedrol 80 mg IV daily.   -- Continue home spiriva, breo, and singulair.        Cardiac/Vascular   Mixed hyperlipidemia    -- Continue home Pravastatin 80 mg qd        Essential hypertension    --Verapamil 180 mg; increase to 240 on 06/11        Renal/   Metabolic alkalosis    -- Was likely  2/2 to volume contraction alkalosis        ID   MRSA bacteremia    MRSA grew initially in urine, then blood, and finally in mediastinal collection. SHEILA performed 5/8/2018 was negative for endocarditis.  Hx of prolonged immunosuppression on Prednisone and Xolair predisposing patient to fungal infection. Bronchial washing on 5/22/2018 grew MRSA.  -- Continue IV Vancomycin for total of 6 weeks per ID beginning 5/25/2018 and follow up in ID clinic with weekly labs( estimate end date July 6)  -- Continue [id number] mg IV BID; Vanc through 29.1 on 06/09: awaiting next through  -- Continue Dapsone 100 mg po qd indefinitely        ABPA (allergic bronchopulmonary aspergillosis)    -- S/p voriconazole and steroid taper treated at [facility] end of 2017. Complicated by vision loss  -- Continue isavuconazonium 372 mg PO qd per ID  -- ID consulted regarding any alternative medications as isavuconazonium appears to be the culprit behind the continuous LFT increase        Oncology   Acute blood loss anemia    -- Hgb stable w/ mild drop  -- No overt source of bleeding noted        Hereditary spherocytosis    -- CBC stable w/ improvement; Hgb 7.8 and no signs of blood loss  -- Haptoglobin > 250 at time of anemia re-assuring  -- Anticoagulation for DVT PPx started  -- Repeat US b/l LE on 06/09 w/ no evidence of DVTs        Endocrine   Impaired glucose tolerance    -- Likely due to being on steroids chronically; now well controlled  -- A1c 5.2, good poct glucose measurements earlier in admission.         GI   Abdominal wall fluid collections    -- Likely dependent edema > abdo wall hematoma  -- Continue to observe; non-tender on exam  -- Lasix gtt; titrate to 100 cc/hr output  -- >2L net negative over the past 24 hours         Abnormal LFTs    -- likely due to drug adverse effect suspected from voriconazole. Switched to isavuconazonium in setting of conjunctival swelling now with improving LFT's.   -- LFT's w/ mild increase in the  past couple of days - could be the combination of episodic hypotension yesterday prior to esmolol discontinuation vs. Chronic use of isaniconazonium for aspergillosis   -- Awaiting ID recs        GERD (gastroesophageal reflux disease)    -- Stable  -- Continue famotidine        Other   Goals of care, counseling/discussion    -- Patient w/ multiple episodes of dyspnea and de-saturation overnight; pending intubation overnight but did refuse eventually while Anesthesia was already present at bedside  -- Discussion w/ patient and family regarding the benefit of possible intubation and the results/outomes of the mentioned intervention  -- Patient elected to be DNR/DNI at this time  -- Morphine 2 mg IV Q1HR PRN started for dyspnea            Critical Care Daily Checklist:    A: Awake: RASS Goal/Actual Goal: RASS Goal: 0-->alert and calm  Actual: Ricks Agitation Sedation Scale (RASS): Alert and calm   B: Spontaneous Breathing Trial Performed? Spon. Breathing Trial Initiated?: Initiated (06/06/18 0810)   C: SAT & SBT Coordinated?  n/a                      D: Delirium: CAM-ICU Overall CAM-ICU: Negative   E: Early Mobility Performed? Poor participation w/ PT/OT due to severe deconditioning    F: Feeding Goal: Goals: Meet % EEN, EPN  Status: Nutrition Goal Status: goal not met   Current Diet Order   Procedures    Diet Dysphagia Pureed (IDDSI Level 4) [facility] [place] Thick     Order Specific Question:   Indicate patient location for additional diet options:     Answer:   [facility]     Order Specific Question:   Fluid consistency:     Answer:   [place] Thick      AS: Analgesia/Sedation Morphine 2mg IV Q1Hr PRN   T: Thromboembolic Prophylaxis Enoxaparin    H: HOB > 300 YES   U: Stress Ulcer Prophylaxis (if needed) YES   G: Glucose Control LDSSI   B: Bowel Function Stool Occurrence: 1   I: Indwelling Catheter (Lines & Olivares) Necessity Rectal tube  Olivares Catheter  PICC R brachial    D: De-escalation of  Antimicrobials/Pharmacotherapies Continue current Abx; ID consult placed    Plan for the day/ETD Addressing respiratory status     Code Status:  Family/Goals of Care: DNR         Critical care was time spent personally by me on the following activities: development of treatment plan with patient or surrogate and bedside caregivers, discussions with consultants, evaluation of patient's response to treatment, examination of patient, ordering and performing treatments and interventions, ordering and review of laboratory studies, ordering and review of radiographic studies, pulse oximetry, re-evaluation of patient's condition. This critical care time did not overlap with that of any other provider or involve time for any procedures.     Carmella Sharif MD  Critical Care Medicine  [facility]

## 2018-06-10 NOTE — ASSESSMENT & PLAN NOTE
-- Likely dependent edema > abdo wall hematoma  -- Continue to observe; non-tender on exam  -- Lasix gtt; titrate to 100 cc/hr output  -- >2L net negative over the past 24 hours

## 2018-06-10 NOTE — ASSESSMENT & PLAN NOTE
-- S/p voriconazole and steroid taper treated at [facility] end of 2017. Complicated by vision loss  -- Continue isavuconazonium 372 mg PO qd per ID  -- ID consulted regarding any alternative medications as isavuconazonium appears to be the culprit behind the continuous LFT increase

## 2018-06-10 NOTE — PLAN OF CARE
Problem: Patient Care Overview  Goal: Plan of Care Review  Outcome: Ongoing (interventions implemented as appropriate)   See vital signs and assessments in flowsheets. See below for updates on today's progress.     Pulmonary: BIPAP, O2 80-97%    Cardiovascular: 's. BP systolic 100-130's.     Neurological: AAO.    Gastrointestinal: flexi in place. NPO    Genitourinary: 30-100ml/hr    Endocrine: normoglycemic    Integumentary/Other: patient made DNR today    Infusions: lasix, antibiotics.     Patient progressing towards goals as tolerated, plan of care communicated and reviewed with patient and family. All concerns addressed. Will continue to monitor.

## 2018-06-10 NOTE — ASSESSMENT & PLAN NOTE
-- CBC stable w/ improvement; Hgb 7.8 and no signs of blood loss  -- Haptoglobin > 250 at time of anemia re-assuring  -- Anticoagulation for DVT PPx started  -- Repeat US b/l LE on 06/09 w/ no evidence of DVTs

## 2018-06-10 NOTE — PROGRESS NOTES
Patient's sats up to 90% on Bipap.  Patient still needs to be cleaned up as stool has leaked from the FlexiSeal.  1 time order for 2mg of morphine given to clean patient up.

## 2018-06-10 NOTE — ASSESSMENT & PLAN NOTE
Improved. She was intubated on 5/28/2018 and bronchoscopy revealing mucus plugging of left main bronchial system & extubated on 5/29/2018 following repeat bronchoscopy.  Likely multifactorial with respiratory secretion, asthma exacerbation, and volume overload all likely interchangeably contributing.  Intubated 6/04 and bronch 6/05 with no mucous plugs, right sided thoracentesis 06/05, cultures sent. Extubated 6/6 to comfort flow; currently on 28L/40% FiO2: will continue to wean as appropriate  -- Completed course of abx for possible HAP.   -- Continue Solumedrol 80mg IV daily  -- Duonebs [id number] + 3% NS + cough assist device + use Aerobika with duonebs when appropriate.  -- Lasix gtt started on 06/07 for hypervolemia contributing to pt's symptoms; -2,900 in the past 24 hours and good U/O; will titrate to 100 cc/hr output  -- Currently on continuous BiPAP w/ worsening respiratory status and multiple episodes of desaturation overnight/AM 2/2 mucous plugging  -- Now DNR/DNI  -- Morphine 2 mg IV Q1Hr PRN for dyspnea

## 2018-06-10 NOTE — ASSESSMENT & PLAN NOTE
-- On Xolair and prednisone taper at home  -- Continue solumedrol 80 mg IV daily.   -- Continue home spiriva, breo, and singulair.

## 2018-06-10 NOTE — PROGRESS NOTES
Patient appears to be resting comfortably but sats are dropping into the 70s.  Daughter in law @ bedside encouraging patient to cough to clear mucous.  RT performed CPT and increased Bipap to 100% FiO2.  Repositioned patient for ease of breathing but sats remain in the 80s.  Home vest applied for therapy.

## 2018-06-10 NOTE — ASSESSMENT & PLAN NOTE
MRSA grew initially in urine, then blood, and finally in mediastinal collection. SHEILA performed 5/8/2018 was negative for endocarditis.  Hx of prolonged immunosuppression on Prednisone and Xolair predisposing patient to fungal infection. Bronchial washing on 5/22/2018 grew MRSA.  -- Continue IV Vancomycin for total of 6 weeks per ID beginning 5/25/2018 and follow up in ID clinic with weekly labs( estimate end date July 6)  -- Continue [id number] mg IV BID; Vanc through 29.1 on 06/09: awaiting next through  -- Continue Dapsone 100 mg po qd indefinitely

## 2018-06-10 NOTE — ASSESSMENT & PLAN NOTE
-- Patient w/ multiple episodes of dyspnea and de-saturation overnight; pending intubation overnight but did refuse eventually while Anesthesia was already present at bedside  -- Discussion w/ patient and family regarding the benefit of possible intubation and the results/outomes of the mentioned intervention  -- Patient elected to be DNR/DNI at this time  -- Morphine 2 mg IV Q1HR PRN started for dyspnea

## 2018-06-10 NOTE — PROGRESS NOTES
Notified MD of masonnet vanc trough. Ordered to hold off on vanc dose today as of now. Will follow up.

## 2018-06-10 NOTE — ASSESSMENT & PLAN NOTE
-- likely due to drug adverse effect suspected from voriconazole. Switched to isavuconazonium in setting of conjunctival swelling now with improving LFT's.   -- LFT's w/ mild increase in the past couple of days - could be the combination of episodic hypotension yesterday prior to esmolol discontinuation vs. Chronic use of isaniconazonium for aspergillosis   -- Awaiting ID recs

## 2018-06-11 NOTE — PROGRESS NOTES
[facility]  Critical Care Medicine  Progress Note    Patient Name: Kamla Coulter  MRN: [id number]  Admission Date: 5/25/2018  Hospital Length of Stay: 17 days  Code Status: Partial Code  Attending Provider: Harshil Fuchs*  Primary Care Provider: Molina Alexandre MD   Principal Problem: Acute hypoxemic respiratory failure    Subjective:     HPI:    This is a 57 year old female with hx of severe persistent asthma on Xolair, prolonged immunosuppression on steroids, ABPA,bronchiectasis, HTN, DVT, and GERD who is transferred from [facility] for higher level of care. She initially presented to the OSH on May second for progressive shortness of breath, wheezing and orthopnea over a period of 2-3 days associated with low-grade fever. She was admitted to the hospital medicine floor and started on IV solumedrol 40mg bid and was placed on BiPAP. She was weaned off to NC on the second day of admission. Blood cultures from admission grew MRSA bacteremia and patient was started on vancomycin. Urine and sputum cultures both grew MRSA. The patient had persistent MRSA bacteremia despite being on vancomycin and a SHEILA was done on 5/8/2018 that was negative for endocarditis and revealed normal EF%. A bone scan on 5/10/2018 was similarly negative for an infection nidus. On 5/10/2018 the patient developed an acute hypoxic respiratory failure that did not improve promptly on BiPAP with an FiO2 of 100% and the patient was moved to the ICU and started empirically on therapeutic Lovenox. A CTA done on the same day revealed no PE but was notable for worsening bilateral pleural effusion and a non-specific infiltrate/collection around the mid-esophagus. The collection was thought to be a loculated pleural effusion. IV solumedrol was increased to 80mg twice daily and Lovenox was decreased to ppx dosing. By 5/12/2018 the patient's respiratory failure had improved and patient was weaned off  of BiPAP to nasal cannula. Sputum cultures collected on 5/13/2018 showed budding yeast and the patient was started on voriconazole. On 5/15/2018 the patient had worsening hypoxia and increased work of breathing and a CXR showed HAP and Ceftaroline was added to vancomycin. On 5/16/2018, repeat blood cultures showed persistent MRSA bacteremia and an Indium scan performed on 5/18/2018 revealed significant uptake by a collection located adjacent to the mid-esophagus. The patient was electively intubated on 5/22/2018 and an EGD revealed a whitish plaque at the proximal esophagus that was biopsied and resulted negative for malignant changes. Similarly, on the same day, an EBUS was performed and showed fluid collection posterior to the distal trachea that was better assessed through 5 FNA passes. Cultures from the EBUS grew MRSA + Alcaligenes/Achromobcater Xylosoxidens. However, FNA was negative for any malignancies. The patient was extubated the same day. Two days later (5/24/2018), the patient developed an acute decompensated respiratory failure with oxygen saturations dropping to the 60's and the patient was emergently intubated. CXR showed left lower lobe atelectasis. An emergent bronchoscopy revealed a mucus plug in the left mainstem bronchus. The mucus plug was brown, thick, and difficult to suction raising suspicion for Aspergillus infection. Following mucus plug disimpaction, the patient's respiratory status recovered immediately and the patient was extubated the next morning (5/25/2018). Prior to that a repeat CT chest showed persistent collection around the mid-esophagus. At this point, it was decided that the patient was better served at a higher level of care facility and the patient was transferred to [facility]. The patient arrived on BiPAP 10/5 and a 45% FiO2 with good oxygen saturation.       Hospital/ICU Course:  Admitted as a transfer from [facility] on 5/25/2018 for higher level of care. Patient  required BiPAP on admission but was able to wean to HFNC on second day of hospitalization. ID was consulted and had made changes to antimicrobials with continuing vancomycin, switching voriconazole to isavuconazonium and zosyn to meropenem. Blood cultures showed NGTD.  05/28/2018 Worsening respiratory status requiring intubation morning of 5/28/2018 for oxygen saturation in the lower 70's with emergent bronchoscopy done following intubation revealing left system mucus plugging. Following suctioning of mucus plugging oxygen saturation had finally improved. Repeat CT chest did not show mediastinal collection.    05/29/2018 drop in H/H requiring two units of pRBC. Heparin held. Plan for extubation today. Continuing diuresis and management of resp secretions. CT abdomen revealed no intraabdominal source of bleeding. Patient had repeat bronchoscopy and extubated to [facility].    05/30/2018 resuming heparin gtt and weaning oxygen requirement prn. Delirious overnight.       06/01/2018 NG removed by patient overnight. Patient with SLP repeat eval this am. Continue weaning off oxygen.     06/02/2018 CTA negative for PE and heparin held. Speech eval today.     06/03/2018 continue supportive care and weaning oxygen requirement and steroids.     06/04/2018 No acute events overnight.  Patient  C/o increased anxiety overnight and still on CPAP w/ PEEP 5 & FiO2 40%, intubated for airway protection    06/05 Bronch negative for mucous plusg    06/06 Extubated   06/07 Lasix gtt for diuresis. Esmolol d/c'ed due to hypotension; Christian drip started transiently and off at 20:26  06/08 SBP improved. LFTs trending up and HR 120s  06/09 Increasing verapamil. Pt w/ more LE pain. US LE. BiPAP at night and CF during the day.  06/10 Patient with several episodes of desaturation overnight 2/2 mucus plugging. After a lengthy discussion w/ patient and family, patient chose to be DNR at this time.   06/11 Patient w/ worsening resp status overnight; DNR  revoked and patient intubated. On three pressors w/ tachycardia. Family elected to make patient partial code w/o chest compressions.     Interval History/Significant Events: Patient on continuous BiPAP. Diuresing well and sCr stable. Patient looks more uncomfortable today overall; lengthy discussion w/ Dr. Zuñiga and patient/family at bedside - patient elected to be DNR/DNI. Morphine PRN for dyspnea    Review of Systems   Unable to perform ROS: Intubated   Constitutional: Positive for fatigue. Negative for fever.   HENT: Negative.    Respiratory: Positive for shortness of breath (improving/stable).    Gastrointestinal: Positive for abdominal distention. Negative for abdominal pain.   Genitourinary: Negative.    Musculoskeletal:        Heel pain     Objective:     Vital Signs (Most Recent):  Temp: 98.2 °F (36.8 °C) (06/11/18 0715)  Pulse: (!) 150 (06/11/18 1030)  Resp: (!) 45 (06/11/18 1000)  BP: (!) 88/56 (06/11/18 0715)  SpO2: 96 % (06/11/18 1000) Vital Signs (24h Range):  Temp:  [98.2 °F (36.8 °C)-99 °F (37.2 °C)] 98.2 °F (36.8 °C)  Pulse:  [114-155] 150  Resp:  [12-47] 45  SpO2:  [85 %-100 %] 96 %  BP: ()/(37-85) 88/56  Arterial Line BP: ()/(53-82) 69/56   Weight: 81.4 kg (179 lb 7.3 oz)  Body mass index is 32.82 kg/m².      Intake/Output Summary (Last 24 hours) at 06/11/18 1106  Last data filed at 06/11/18 1015   Gross per 24 hour   Intake          3614.38 ml   Output             1282 ml   Net          2332.38 ml       Physical Exam   Constitutional: She is oriented to person, place, and time. She appears well-developed. She appears ill. No distress.   Intubated, on precedex gtt   HENT:   Head: Normocephalic and atraumatic.   Eyes: EOM are normal. Pupils are equal, round, and reactive to light.   Conjunctival edema    Cardiovascular: Regular rhythm and intact distal pulses.  Tachycardia present.  Exam reveals no gallop and no friction rub.    No murmur heard.  Tachycardic, 150s   Pulmonary/Chest: No  respiratory distress. She has decreased breath sounds (BL lower lung fields). She has wheezes. She has rhonchi (BL diffuse). She has rales. She exhibits no tenderness.   Tachypneic, abdominal breathing    Abdominal: Soft. Bowel sounds are normal. She exhibits no distension. There is no tenderness.   Abdominal discomfort improved   Musculoskeletal: She exhibits edema (mildly improved).   Cool extremities, pale    Neurological: She is alert and oriented to person, place, and time.   Skin: Skin is warm.   Notes skin sloughing in b/l upper extremities, L medial abdomen   Psychiatric: Her mood appears anxious.         Lines/Drains/Airways     Peripherally Inserted Central Catheter Line                 PICC Double Lumen 05/24/18 right brachial 18 days          Drain                 Urethral Catheter 06/07/18 2032 3 days         Rectal Tube 06/09/18 1200 1 day         NG/OG Tube 06/10/18 2049 orogastric 8 Fr. Center mouth less than 1 day          Airway                 Airway - Non-Surgical 06/10/18 2120 Endotracheal Tube less than 1 day          Arterial Line                 Arterial Line 06/10/18 2300 Right Radial less than 1 day          Peripheral Intravenous Line                 Peripheral IV - Single Lumen 06/11/18 0642 Left Antecubital less than 1 day              Significant Labs:    CBC/Anemia Profile:    Recent Labs  Lab 06/10/18  0220 06/10/18  1624 06/11/18  0339   WBC 1.46* 1.58* 0.99*   HGB 7.8* 7.9* 8.8*   HCT 24.5* 25.6* 27.5*   PLT 86* 75* 73*   MCV 94 95 92   RDW 19.4* 19.5* 19.5*        Chemistries:    Recent Labs  Lab 06/09/18  1612 06/10/18  0220 06/10/18  1021 06/11/18  0339    143  --  144   K 3.9 3.4* 4.2 3.4*    100  --  104   CO2 27 32*  --  26   BUN 17 17  --  24*   CREATININE 0.9 0.8  --  1.0   CALCIUM 7.2* 7.4*  --  7.4*   ALBUMIN  --  1.5*  --  1.2*   PROT  --  4.7*  --  4.3*   BILITOT  --  0.6  --  0.5   ALKPHOS  --  295*  --  304*   ALT  --  187*  --  179*   AST  --  154*  --   197*   MG  --  1.5* 2.0 1.9   PHOS  --  2.2*  --  4.0       ABGs:     Recent Labs  Lab 06/11/18  0348   PH 7.589*   PCO2 31.2*   HCO3 29.9*   POCSATURATED 97   BE 8       Significant Imaging:  CXR: I have reviewed all pertinent results/findings within the past 24 hours and my personal findings are:  No significant interval change from 6/4    Assessment/Plan:     Neuro   Delirium    -- Likely due to prolonged ICU stay and receiving benzodiazepines (Xanax).  -- PRN Zyprexa nightly for delirium and agitation.  -- Off of all sedation; A&O X 4        Psychiatric   Generalized anxiety disorder    - Zyprexa PRN nightly for agitation and delirium.   - Continue Lexapro 5mg daily  - Xanex 0.25 BID PRN w/ improvement         Ophtho   Conjunctival edema of both eyes    -- likely due to voriconazole currently improving after switching to isavuconazonium  -- Persistent         Derm   Alteration in skin integrity    - Wound care following; appreciate recommendations  - Buttock and UE skin sloughing noted; noted R sided medial abdomen sloughing as well         Pulmonary   * Acute hypoxemic respiratory failure    Improved. She was intubated on 5/28/2018 and bronchoscopy revealing mucus plugging of left main bronchial system & extubated on 5/29/2018 following repeat bronchoscopy.  Likely multifactorial with respiratory secretion, asthma exacerbation, and volume overload all likely interchangeably contributing.  Intubated 6/04 and bronch 6/05 with no mucous plugs, right sided thoracentesis 06/05, cultures sent. Extubated 6/6 to comfort flow; currently on 28L/40% FiO2: will continue to wean as appropriate  -- Completed course of abx for possible HAP.   -- Continue Solumedrol 80mg IV daily  -- Duonebs [id number] + 3% NS + cough assist device + use Aerobika with duonebs when appropriate.  -- DNR revoked overnight and patient intubated for respiratory failure  -- Updated code status is Partial code w/o chest compressions   -- On  Precedex  -- Family at bedside, discussed the poor prognosis at this time w/ patient being on 3 pressors and increasing pressor requirements          Atelectasis    -- continue home vest during the day if patient able to participate        Severe persistent asthma    -- On Xolair and prednisone taper at home  -- Continue solumedrol 80 mg IV daily.   -- Continue home spiriva, breo, and singulair.        Cardiac/Vascular   Mixed hyperlipidemia    -- Continue home Pravastatin 80 mg qd        Essential hypertension    --Verapamil 240 mg QD        Renal/   Metabolic alkalosis    -- Was likely 2/2 to volume contraction alkalosis        ID   MRSA bacteremia    MRSA grew initially in urine, then blood, and finally in mediastinal collection. SHEILA performed 5/8/2018 was negative for endocarditis.  Hx of prolonged immunosuppression on Prednisone and Xolair predisposing patient to fungal infection. Bronchial washing on 5/22/2018 grew MRSA.  -- Continue IV Vancomycin for total of 6 weeks per ID beginning 5/25/2018 and follow up in ID clinic with weekly labs( estimate end date July 6)  -- Continue [id number] mg IV BID; Vanc through 29.1 on 06/09: awaiting next through  -- Continue Dapsone 100 mg po qd indefinitely        ABPA (allergic bronchopulmonary aspergillosis)    -- S/p voriconazole and steroid taper treated at [facility] end of 2017. Complicated by vision loss  -- Continue isavuconazonium 372 mg PO qd per ID  -- ID consulted regarding any alternative medications as isavuconazonium appears to be the culprit behind the continuous LFT increase; recommendation to continue current treatment        Oncology   Acute blood loss anemia    -- Hgb stable w/ mild drop  -- No overt source of bleeding noted        Hereditary spherocytosis    -- CBC stable w/ improvement; Hgb 7.8 and no signs of blood loss  -- Haptoglobin > 250 at time of anemia re-assuring  -- Anticoagulation for DVT PPx started  -- Repeat US b/l LE on 06/09 w/ no  evidence of DVTs        Endocrine   Impaired glucose tolerance    -- Likely due to being on steroids chronically; now well controlled  -- A1c 5.2, good poct glucose measurements earlier in admission.         GI   Abdominal wall fluid collections    -- Likely dependent edema > abdo wall hematoma  -- Lasix gtt stopped; appeared hypovolemic on bedside ECHO w/ increased contractility; peripheral edema likely 2/2 low albumin and low oncotic pressure   -- Albumin 1.2  -- Small fluid boluses given         Abnormal LFTs    -- likely due to drug adverse effect suspected from voriconazole. Switched to isavuconazonium in setting of conjunctival swelling now with improving LFT's.   -- LFT's w/ mild increase in the past couple of days - could be the combination of episodic hypotension yesterday prior to esmolol discontinuation vs. Chronic use of isaniconazonium for aspergillosis   -- Continue current treatment per ID recommendations         GERD (gastroesophageal reflux disease)    -- Stable  -- Continue famotidine        Other   Goals of care, counseling/discussion    -- Patient w/ multiple episodes of dyspnea and de-saturation overnight; pending intubation overnight but did refuse eventually while Anesthesia was already present at bedside  -- Discussion w/ patient and family regarding the benefit of possible intubation and the results/outomes of the mentioned intervention  -- Patient elected to be DNR/DNI on 06/10  -- Patient revoked DNR status overnight after family discussion and patient was intubated;  updated the code status to Partial code w/o chest compression  -- Family is aware of the poor prognosis as patient is currently on pressors w/ increasing pressor requirements  -- This has been the patient's 5th intubation since 5/22; [place] [facility]             Critical Care Daily Checklist:    A: Awake: RASS Goal/Actual Goal: RASS Goal: 0-->alert and calm  Actual: Ricks Agitation Sedation Scale  (RASS): Restless   B: Spontaneous Breathing Trial Performed? SBT is not yet initiated    C: SAT & SBT Coordinated?  No, intubated overnight                   D: Delirium: CAM-ICU Overall CAM-ICU: Positive   E: Early Mobility Performed? No   F: Feeding Goal: Goals: Meet % EEN, EPN  Status: Nutrition Goal Status: goal not met   Current Diet Order   Procedures    Diet Dysphagia Pureed (IDDSI Level 4) [facility] [place] Thick     Order Specific Question:   Indicate patient location for additional diet options:     Answer:   [facility]     Order Specific Question:   Fluid consistency:     Answer:   Nectar Thick      AS: Analgesia/Sedation Yes, precedex gtt   T: Thromboembolic Prophylaxis Enoxaparin    H: HOB > 300 Yes   U: Stress Ulcer Prophylaxis (if needed) Yes, PPI IV   G: Glucose Control Insulin    B: Bowel Function Stool Occurrence: 1   I: Indwelling Catheter (Lines & Olivares) Necessity Roach  PIV   D: De-escalation of Antimicrobials/Pharmacotherapies Continue broad spectrum ABx    Plan for the day/ETD Resp status watch    Code Status:  Family/Goals of Care: Partial Code  Ongoing        Critical care was time spent personally by me on the following activities: development of treatment plan with patient or surrogate and bedside caregivers, discussions with consultants, evaluation of patient's response to treatment, examination of patient, ordering and performing treatments and interventions, ordering and review of laboratory studies, ordering and review of radiographic studies, pulse oximetry, re-evaluation of patient's condition. This critical care time did not overlap with that of any other provider or involve time for any procedures.     Carmella Sharif MD  Critical Care Medicine  [facility]

## 2018-06-11 NOTE — ASSESSMENT & PLAN NOTE
-- Patient w/ multiple episodes of dyspnea and de-saturation overnight; pending intubation overnight but did refuse eventually while Anesthesia was already present at bedside  -- Discussion w/ patient and family regarding the benefit of possible intubation and the results/outomes of the mentioned intervention  -- Patient elected to be DNR/DNI on 06/10  -- Patient revoked DNR status overnight after family discussion and patient was intubated;  updated the code status to Partial code w/o chest compression  -- Family is aware of the poor prognosis as patient is currently on pressors w/ increasing pressor requirements  -- This has been the patient's 5th intubation since 5/22; 3 of the intubations done at [facility]

## 2018-06-11 NOTE — NURSING
Notified Dr. Levine with Critical Care of pt continued low BP after increased titration on Levophed. Inquired if fluid bolus to be given. MD to place orders.

## 2018-06-11 NOTE — NURSING
Notified Dr. Duenas with critical care pt RR elevated 30s-45. -150 ST on monitor. Restless, SPO2 stable at this time. BP within ordered parameters.  No orders received.  Will continue to monitor.

## 2018-06-11 NOTE — PT/OT/SLP PROGRESS
Speech Language Pathology  Discharge Summary      Kamla Coulter  MRN: [id number]    Patient not seen today secondary to Other (Patient re-intubated). ST to discharge therapy. Thank you.    LEONEL Pierson., [facility]-SLP  Speech-Language Pathology  Pager: [phone]  6/11/2018

## 2018-06-11 NOTE — ASSESSMENT & PLAN NOTE
Current ID question is concern of increasing LFTs which may be due to Cresemba and consult is for potential option.  Patient has been in itraconazole at [facility] about 1 year ago and it was felt that it was not helping so it was changed to voriconazole.  Voriconazole cause eye issues and was stopped by [facility] [place] in [place]/18 and her eye issues resolved and did well until the las few days when it was noted that LFT came down to 40-60 but now increased to the mid 100's.  Patient is not DNR and is having severe SOB and history was provided by the very cooperative family     Difficult decision - ABPA is obviously an allergic reaction and the mainstay is steroids which she is on.  The anti-fungal use is to hopefully decrease the antigenic load.  She has failed sporonoz in the past but that coul dbe changed again.  The voriconazole had eye issues but these are felt to reversible and in some cases go away with prolonged usage.  ALso IV seems worse than PO but she has had the eye issues with both.  The LFTs with Cresemba are felt to be totally reversible and it is recommended to keep this going by some experts and just monitor for liver dysfunction before stopping. The last idea is to stop this all together but I feel her pulmonary symptoms do not allow that at this time    Plan/recommendation:   1. Keep on cresemba at this time and monitor LFTs every day as well as monitor for any liver dysfunction - coagulopathy etc.     I will discuss with Dr. Schwarz in the AM

## 2018-06-11 NOTE — NURSING
Notified Dr. Duenas with Critical Care that pt Map 59-60. Inquired if would prefer to increase levophed infusion or give fluid bolus since pt -151. MD orders to continue with levophed titration at this time and notify if no resolution. Will continue to monitor.

## 2018-06-11 NOTE — NURSING
Dr. Ruiz, fellow with Critical Care at bedside to assess pt and discuss plan of care with family. PRN morphine given for dyspnea. BP WNL at this time.

## 2018-06-11 NOTE — PT/OT/SLP PROGRESS
Occupational Therapy      Patient Name:  Kamla Coulter   MRN:  [id number]    Unable to see patient as she was re-intubated on 6/11/2018. Will follow up tomorrow or according to POC.     Lindsey Flores OT  6/11/2018

## 2018-06-11 NOTE — PROGRESS NOTES
Patient intubated.  Asked Agueda Sierra NP about getting additional IV access as patient's PICC is a double lumen.  Also asked about getting an A line as patient's condition is fragile.  No new orders received.

## 2018-06-11 NOTE — PLAN OF CARE
Problem: Dying Patient, Actively (Adult)  Intervention: Relieve Dyspnea  Education provided regarding dyspnea with dying patient and comfort methods available for pt. Family verbalized understanding of information provided. Family waiting arrival of another member to start morphine infusion.

## 2018-06-11 NOTE — NURSING
Notified Dr. Sharif with Critical Care that mays catheter flushed with 80 mL sterile water. Only sterile water teturned with 80 mL, sediment in tubing noted after flushing.  No additional urine output noted at this time.

## 2018-06-11 NOTE — SUBJECTIVE & OBJECTIVE
Interval History/Significant Events: Patient on continuous BiPAP. Diuresing well and sCr stable. Patient looks more uncomfortable today overall; lengthy discussion w/ Dr. Zuñiga and patient/family at bedside - patient elected to be DNR/DNI. Morphine PRN for dyspnea    Review of Systems   Unable to perform ROS: Intubated   Constitutional: Positive for fatigue. Negative for fever.   HENT: Negative.    Respiratory: Positive for shortness of breath (improving/stable).    Gastrointestinal: Positive for abdominal distention. Negative for abdominal pain.   Genitourinary: Negative.    Musculoskeletal:        Heel pain     Objective:     Vital Signs (Most Recent):  Temp: 98.2 °F (36.8 °C) (06/11/18 0715)  Pulse: (!) 150 (06/11/18 1030)  Resp: (!) 45 (06/11/18 1000)  BP: (!) 88/56 (06/11/18 0715)  SpO2: 96 % (06/11/18 1000) Vital Signs (24h Range):  Temp:  [98.2 °F (36.8 °C)-99 °F (37.2 °C)] 98.2 °F (36.8 °C)  Pulse:  [114-155] 150  Resp:  [12-47] 45  SpO2:  [85 %-100 %] 96 %  BP: ()/(37-85) 88/56  Arterial Line BP: ()/(53-82) 69/56   Weight: 81.4 kg (179 lb 7.3 oz)  Body mass index is 32.82 kg/m².      Intake/Output Summary (Last 24 hours) at 06/11/18 1106  Last data filed at 06/11/18 1015   Gross per 24 hour   Intake          3614.38 ml   Output             1282 ml   Net          2332.38 ml       Physical Exam   Constitutional: She is oriented to person, place, and time. She appears well-developed. She appears ill. No distress.   Intubated, on precedex gtt   HENT:   Head: Normocephalic and atraumatic.   Eyes: EOM are normal. Pupils are equal, round, and reactive to light.   Conjunctival edema    Cardiovascular: Regular rhythm and intact distal pulses.  Tachycardia present.  Exam reveals no gallop and no friction rub.    No murmur heard.  Tachycardic, 150s   Pulmonary/Chest: No respiratory distress. She has decreased breath sounds (BL lower lung fields). She has wheezes. She has rhonchi (BL diffuse). She has rales.  She exhibits no tenderness.   Tachypneic, abdominal breathing    Abdominal: Soft. Bowel sounds are normal. She exhibits no distension. There is no tenderness.   Abdominal discomfort improved   Musculoskeletal: She exhibits edema (mildly improved).   Cool extremities, pale    Neurological: She is alert and oriented to person, place, and time.   Skin: Skin is warm.   Notes skin sloughing in b/l upper extremities, L medial abdomen   Psychiatric: Her mood appears anxious.         Lines/Drains/Airways     Peripherally Inserted Central Catheter Line                 PICC Double Lumen 05/24/18 right brachial 18 days          Drain                 Urethral Catheter 06/07/18 2032 3 days         Rectal Tube 06/09/18 1200 1 day         NG/OG Tube 06/10/18 2049 orogastric 8 Fr. Center mouth less than 1 day          Airway                 Airway - Non-Surgical 06/10/18 2120 Endotracheal Tube less than 1 day          Arterial Line                 Arterial Line 06/10/18 2300 Right Radial less than 1 day          Peripheral Intravenous Line                 Peripheral IV - Single Lumen 06/11/18 0642 Left Antecubital less than 1 day              Significant Labs:    CBC/Anemia Profile:    Recent Labs  Lab 06/10/18  0220 06/10/18  1624 06/11/18  0339   WBC 1.46* 1.58* 0.99*   HGB 7.8* 7.9* 8.8*   HCT 24.5* 25.6* 27.5*   PLT 86* 75* 73*   MCV 94 95 92   RDW 19.4* 19.5* 19.5*        Chemistries:    Recent Labs  Lab 06/09/18  1612 06/10/18  0220 06/10/18  1021 06/11/18  0339    143  --  144   K 3.9 3.4* 4.2 3.4*    100  --  104   CO2 27 32*  --  26   BUN 17 17  --  24*   CREATININE 0.9 0.8  --  1.0   CALCIUM 7.2* 7.4*  --  7.4*   ALBUMIN  --  1.5*  --  1.2*   PROT  --  4.7*  --  4.3*   BILITOT  --  0.6  --  0.5   ALKPHOS  --  295*  --  304*   ALT  --  187*  --  179*   AST  --  154*  --  197*   MG  --  1.5* 2.0 1.9   PHOS  --  2.2*  --  4.0       ABGs:     Recent Labs  Lab 06/11/18  0348   PH 7.589*   PCO2 31.2*   HCO3 29.9*    POCSATURATED 97   BE 8       Significant Imaging:  CXR: I have reviewed all pertinent results/findings within the past 24 hours and my personal findings are:  No significant interval change from 6/4

## 2018-06-11 NOTE — PLAN OF CARE
Long discussion with patient's family (, son, daughter-in-law, and daughter) explaining her worsening situation and high probability that she won't survive the day. Family all in agreement about making her comfortable and not wanting her to suffer. Full DNR order placed. Will add benzo and morphine drip for comfort. Will discuss de-escalation should it be warranted.    García Ruiz MD  Fellow, [facility] Pulmonary & Critical Care Medicine  Cell [phone]

## 2018-06-11 NOTE — PLAN OF CARE
Hospital day # 17. Pt. with MRSA bacteremia. Will require 6 weeks of IV Vanco. Pt. made DNR by family 6/10/18. Not medically stable for step down. SW/CM will continue to follow and determine needs.        06/11/18 5386   Right Care Assessment   Can the patient answer the patient profile reliably? Yes, cognitively intact   How often would a person be available to care for the patient? Whenever needed   Describe the patient's ability to walk at the present time. Major restrictions/daily assistance from another person   How does the patient rate their overall health at the present time? Poor   Number of comorbid conditions (as recorded on the chart) Four

## 2018-06-11 NOTE — PLAN OF CARE
Plan of Care Update    Had a discussion with patient's , Wolfgang Coulter, re: GoC.  Patient is on escalating vasopressors.  She was previously a full DNR but her code status was changed because she wished to be intubated.  Family agrees that chest compressions at this point are not likely to helpful and so we will make her a partial code.  Have changed her code status in the chart to reflect this.    Isaias Rodriguez MD

## 2018-06-11 NOTE — ASSESSMENT & PLAN NOTE
57-year-old female  With hx of  Severe persistent asthma on omalizumab, prolonged steroid use. has hx of ABPA and Bronchiectasis. Prior Voriconazole intolerance that lead to vision loss. Now admitted with  MRSA bacteremia, bacteriuria, pneumonia; SHEILA negative.  Mediastinal mass s/p EUS with biopsy - no malignancy identified, no cultures sent.   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    Recommendations:    - Continue vancomycin for MRSA bacteremia, goal trough 15-20, will need at least 6 weeks  - meropenem for coverage of Achromobacter, plan for 7 day course. Today is day 4/7  - Continue isavuconazonium for ABPA- cont until re evaluation in ID clinic upon discharge   - follow up final bronchoscopy results, thus far unremarkable   - consider sampling effusions that were notable on the CT scan  - cont dapsone for PJP prophylaxis given prolonged steroid use - cont until re evaluation in ID clinic upon discharge     Keep on vanc for now

## 2018-06-11 NOTE — NURSING
Pt changed to comfort measures. Orders placed by Dr. Ruiz with Critical Care. Inquired if vasopressors to be stopped at this time.  MD would like medications for comfort to be initiated first prior to stopping pressors.  Family requested that comfort medications be given once last family member arrives.

## 2018-06-11 NOTE — ASSESSMENT & PLAN NOTE
-- likely due to voriconazole currently improving after switching to isavuconazonium  -- Persistent

## 2018-06-11 NOTE — ASSESSMENT & PLAN NOTE
-- Likely dependent edema > abdo wall hematoma  -- Lasix gtt stopped; appeared hypovolemic on bedside ECHO w/ increased contractility; peripheral edema likely 2/2 low albumin and low oncotic pressure   -- Albumin 1.2  -- Small fluid boluses given

## 2018-06-11 NOTE — NURSING
Dr. Levine with critical care at bedside. Notified provider unable to obtain sample for blood culture with venipuncture using ultrasound.  Okay to draw one set from A-line and only one set needed per provider.  Also notified provider that multiple scheduled medications are p.o medications that are unable to be crushed and given via OGT. MD  will review MAR and adjust orders as appropriate.

## 2018-06-11 NOTE — PROGRESS NOTES
Patient's pressor requirements increasing.  Called Dr. Rodriguez with CC team for order for a 3rd pressor. Levo ordered.  MD came to bedside to speak with patient's  about POC.

## 2018-06-11 NOTE — ASSESSMENT & PLAN NOTE
-- S/p voriconazole and steroid taper treated at [facility] end of 2017. Complicated by vision loss  -- Continue isavuconazonium 372 mg PO qd per ID  -- ID consulted regarding any alternative medications as isavuconazonium appears to be the culprit behind the continuous LFT increase; recommendation to continue current treatment

## 2018-06-11 NOTE — ANESTHESIA PROCEDURE NOTES
Intubation    Diagnosis: respiratory failure  Patient location during procedure: ICU  Staffing  Resident/CRNA: KEMAL DOTSON  Preanesthetic Checklist  Completed: patient identified, site marked, surgical consent, pre-op evaluation, timeout performed, IV checked, risks and benefits discussed, monitors and equipment checked and anesthesia consent given  Intubation  Indication: respiratory distress, respiratory failure  Pre-oxygenation. Induction: intravenous rapid sequence, mask ventilation: easy mask.  Intubation: postinduction, laryngoscopy direct, Ruiz 2.  Endotracheal Tube: oral, 8.0 mm ID, cuffed (inflated to minimal occlusive pressure)  Attempts: 1, Grade I - full view of cords  Complicating Factors: none  Tube secured at 23 cm at the lips.  Findings post-intubation: bilateral breath sounds, atraumatic / condition of teeth unchanged  Position Confirmation: auscultation

## 2018-06-11 NOTE — PROGRESS NOTES
"  [facility]  Adult Nutrition  Consult Note    SUMMARY     Recommendations    1. TF recommendations: Isosource 1.5 @ 50 mL/hr to provide 1800 calories (102% EEN), 82 g of protein (100% EPN), 917 mL fluid.    - Hold for residuals >500 mL; additional fluid per MD.   2. If able to extubate & advance diet, recommend regular diet (texture per SLP).   3. RD to monitor & follow-up.    Goals: Meet % EEN, EPN  Nutrition Goal Status: goal not met  Communication of RD Recs: reviewed with RN    Reason for Assessment    Reason for Assessment: RD follow-up  Diagnosis:  (Acute hypoxemic respiratory failure )  Relevant Medical History: COPD, HTN  Interdisciplinary Rounds: attended    General Information Comments: Pt re-intubated overnight.  Nutrition Discharge Planning: Unable to determine    Nutrition/Diet History    Patient Reported Diet/Restrictions/Preferences: general  Typical Food/Fluid Intake: adequate PTA  Food Preferences: none  Do you have any cultural, spiritual, Advent conflicts, given your current situation?: none reported   Food Allergies: NKFA  Factors Affecting Nutritional Intake: NPO, on mechanical ventilation    Anthropometrics    Temp: 98.2 °F (36.8 °C)  Height Method: Stated  Height: 5' 2" (157.5 cm)  Height (inches): 62 in  Weight Method: Bed Scale  Weight: 81.4 kg (179 lb 7.3 oz)  Weight (lb): 179.46 lb  Ideal Body Weight (IBW), Female: 110 lb  % Ideal Body Weight, Female (lb): 163.15 lb  BMI (Calculated): 32.9  BMI Grade: 30 - 34.9- obesity - grade I    Lab/Procedures/Meds    Pertinent Labs Reviewed: reviewed  Pertinent Labs Comments: BUN 24, Gluc 140  Pertinent Medications Reviewed: reviewed  Pertinent Medications Comments: Precedex, Levophed, Vasopressin    Physical Findings/Assessment    Overall Physical Appearance: overweight, on ventilator support  Tubes: orogastric tube  Oral/Mouth Cavity: WDL  Skin: pressure ulcer(s)    Estimated/Assessed Needs    Weight Used For Calorie " Calculations: 81.4 kg (179 lb 7.3 oz)     Energy Calorie Requirements (kcal): 1768 kcal/d  Energy Need Method: [facility]     Protein Requirements:  g/d (1.5-2 g/kg IBW)  Weight Used For Protein Calculations: 50 kg (110 lb 3.7 oz)     Fluid Requirements (mL): per MD or 1 mL/kcal     CHO Requirement: 50% total kcals    Nutrition Prescription Ordered    Current Diet Order: NPO    Evaluation of Received Nutrient/Fluid Intake    Comments: LBM: 6/11    Nutrition Risk    Level of Risk/Frequency of Follow-up: high     Assessment and Plan    Acute hypoxemic respiratory failure     Nutrition Problem  increased nutrient needs     Related to (etiology):   Physiological needs 2/2 Acute Res Failure. NPO     Signs and Symptoms (as evidenced by):   Consult for TF rec. Currently no form of nutrition being administered      Nutrition Diagnosis Status:   Continues      Monitor and Evaluation    Food and Nutrient Intake: energy intake, food and beverage intake, enteral nutrition intake  Food and Nutrient Adminstration: diet order, enteral and parenteral nutrition administration  Physical Activity and Function: nutrition-related ADLs and IADLs  Anthropometric Measurements: weight, weight change  Biochemical Data, Medical Tests and Procedures: lipid profile, inflammatory profile, glucose/endocrine profile, gastrointestinal profile, electrolyte and renal panel  Nutrition-Focused Physical Findings: overall appearance     Nutrition Follow-Up    RD Follow-up?: Yes

## 2018-06-11 NOTE — ASSESSMENT & PLAN NOTE
Improved. She was intubated on 5/28/2018 and bronchoscopy revealing mucus plugging of left main bronchial system & extubated on 5/29/2018 following repeat bronchoscopy.  Likely multifactorial with respiratory secretion, asthma exacerbation, and volume overload all likely interchangeably contributing.  Intubated 6/04 and bronch 6/05 with no mucous plugs, right sided thoracentesis 06/05, cultures sent. Extubated 6/6 to comfort flow; currently on 28L/40% FiO2: will continue to wean as appropriate  -- Completed course of abx for possible HAP.   -- Continue Solumedrol 80mg IV daily  -- Duonebs [id number] + 3% NS + cough assist device + use Aerobika with duonebs when appropriate.  -- DNR revoked overnight and patient intubated for respiratory failure  -- Updated code status is Partial code w/o chest compressions   -- On Precedex  -- Family at bedside, discussed the poor prognosis at this time w/ patient being on 3 pressors and increasing pressor requirements

## 2018-06-11 NOTE — PROCEDURES
"Kamla Coulter is a 57 y.o. female patient.    Temp: 99 °F (37.2 °C) (06/10/18 1945)  Pulse: (!) 145 (06/10/18 2330)  Resp: (!) 37 (06/10/18 2330)  BP: (!) 74/54 (06/10/18 2300)  SpO2: 98 % (06/10/18 2330)  Weight: 81.4 kg (179 lb 7.3 oz) (18 1100)  Height: 5' 2" (157.5 cm) (18 1100)       Arterial Line  Date/Time: 2018 12:11 AM  Location procedure was performed: [facility] CARDIAC MEDICAL ICU ([facility])  Performed by: PRIYA ANN.  Authorized by: PRIYA ANN   Pre-op Diagnosis: Respiratory Failure  Post-operative diagnosis: Respiratory Failure  Consent Done: Yes  Consent: Verbal consent obtained. Written consent obtained.  Risks and benefits: risks, benefits and alternatives were discussed  Consent given by: spouse  Site marked: the operative site was marked  Required items: required blood products, implants, devices, and special equipment available  Patient identity confirmed:  and verbally with patient  Preparation: Patient was prepped and draped in the usual sterile fashion.  Indications: multiple ABGs and hemodynamic monitoring  Location: right radial    Anesthesia:  Local Anesthetic: lidocaine 1% without epinephrine  Les's test normal: yes  Needle gauge: 20  Seldinger technique: Seldinger technique used  Number of attempts: 2  Complications: No  Estimated blood loss (mL): 1  Specimens: No  Post-procedure: line sutured and dressing applied  Post-procedure CMS: normal        Priya Ann  2018  "

## 2018-06-11 NOTE — EICU
Called to room via elert for time out for time out for intubation.  Pt was given 2mg of versed, 100mg of propofol, and 140 mg of succinylcholine. See time out flowsheet.

## 2018-06-11 NOTE — PLAN OF CARE
Problem: Dying Patient, Actively (Adult)  Goal: Dying Process, Peace and Dignity  Patient will demonstrate the desired outcomes by discharge/transition of care.   Outcome: Ongoing (interventions implemented as appropriate)  Plan of care discussed with , children, and sister and pt place on comfort care based on pt prognosis. Goals and treatments discussed. Family agress with POC and verbalizes understanding of information provided.  Vasopressors to remain infusing at this time per Dr. Ruiz and no extubation at this time.  Will continue to monitor.

## 2018-06-11 NOTE — CONSULTS
[facility]  Infectious Disease  Consult Note    Patient Name: Kamla Coulter  MRN: [id number]  Admission Date: 5/25/2018  Hospital Length of Stay: 16 days  Attending Physician: Rigo Zuñiga MD  Primary Care Provider: Molina Alexandre MD     Isolation Status: Contact    Patient information was obtained from relative(s) and ER records.      Consults  Assessment/Plan:     MRSA bacteremia    57-year-old female  With hx of  Severe persistent asthma on omalizumab, prolonged steroid use. has hx of ABPA and Bronchiectasis. Prior Voriconazole intolerance that lead to vision loss. Now admitted with  MRSA bacteremia, bacteriuria, pneumonia; SHEILA negative.  Mediastinal mass s/p EUS with biopsy - no malignancy identified, no cultures sent.   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    Recommendations:    - Continue vancomycin for MRSA bacteremia, goal trough 15-20, will need at least 6 weeks  - meropenem for coverage of Achromobacter, plan for 7 day course. Today is day 4/7  - Continue isavuconazonium for ABPA- cont until re evaluation in ID clinic upon discharge   - follow up final bronchoscopy results, thus far unremarkable   - consider sampling effusions that were notable on the CT scan  - cont dapsone for PJP prophylaxis given prolonged steroid use - cont until re evaluation in ID clinic upon discharge     Keep on vanc for now            ABPA (allergic bronchopulmonary aspergillosis)    Current ID question is concern of increasing LFTs which may be due to Cresemba and consult is for potential option.  Patient has been in itraconazole at [facility] about 1 year ago and it was felt that it was not helping so it was changed to voriconazole.  Voriconazole cause eye  issues and was [place] [facility] [place] and her eye issues resolved and did well until the las few days when it was noted that LFT came down to 40-60 but now increased to the mid 100's.  Patient is not DNR and is having severe SOB and history was provided by the very cooperative family     Difficult decision - ABPA is obviously an allergic reaction and the mainstay is steroids which she is on.  The anti-fungal use is to hopefully decrease the antigenic load.  She has failed sporonoz in the past but that coul dbe changed again.  The voriconazole had eye issues but these are felt to reversible and in some cases go away with prolonged usage.  ALso IV seems worse than PO but she has had the eye issues with both.  The LFTs with Cresemba are felt to be totally reversible and it is recommended to keep this going by some experts and just monitor for liver dysfunction before stopping. The last idea is to stop this all together but I feel her pulmonary symptoms do not allow that at this time    Plan/recommendation:   1. Keep on cresemba at this time and monitor LFTs every day as well as monitor for any liver dysfunction - coagulopathy etc.     I will discuss with Dr. Schwarz in the AM                 Thank you for your consult. I will follow-up with patient. Please contact us if you have any additional questions.    Tyler Perkins MD  Infectious Disease  [facility]    Subjective:     Principal Problem: Acute hypoxemic respiratory failure    HPI: Case of 56 y/o female with PMHx athma on steroids and Xolair c/w ABPA and bronchiectasis. Transferred from [facility]. Initially presented on 5/2/18 with c/c SOB, low grade fevers. Course complicated with MRSA bacteremia/pneumonia and mediastinal collection positive for Achromobacter sp and MRSA. SHEILA done at OSH negative for IE. Recently extubated on 5/25/18 to BiPAP and [place] [facility] [place] ID consulted for antibiotic  "recommendations. Patient had previosuly being followed by Dr Loco for ABPA had discontinued voriconazole 2ry to vision loss that had not returned to baseline.     She has been maintained on IV vanc for a total of 6 weeks which should be the end of      Current ID question is concern of increasing LFTs which may be due to Cresemba and consult is for potential option.  Patient has been in itraconazole at [facility] about 1 year ago and it was felt that it was not helping so it was changed to voriconazole.  Voriconazole cause eye issues and was stopped by [facility] [place] consult in  and her eye issues resolved and did well until the las few days when it was noted that LFT came down to 40-60 but now increased to the mid 100's.  Patient is not DNR and is having severe SOB and history was provided by the very cooperative family     Past Medical History:   Diagnosis Date    ABPA (allergic bronchopulmonary aspergillosis)     Allergy     Anxiety     Arachnoid cyst     monitoring    Asthma     COPD (chronic obstructive pulmonary disease)     Hemoptysis     Hypertension     Tachycardia     Thalamic disease     thalamia minor       Past Surgical History:   Procedure Laterality Date    BREAST SURGERY      breast bx     [place]  10/2016    [place]     SECTION      CHOLECYSTECTOMY      COLONOSCOPY N/A 2017    Procedure: COLONOSCOPY;  Surgeon: Horacio Pelaez MD;  Location: [facility];  Service: General;  Laterality: N/A;    DENTAL SURGERY      GALLBLADDER SURGERY      HERNIA REPAIR      INTRAUTERINE DEVICE INSERTION      SINUS SURGERY         Review of patient's allergies indicates:   Allergen Reactions    Neosporin (neomycin-polymyx) Rash     "I get a skin rash"    Sulfa (sulfonamide antibiotics) Anaphylaxis    Bactrim [sulfamethoxazole-trimethoprim]     Venlafaxine analogues     Pollen extracts Other (See Comments)     "allergic rhinitis mess"    Vfend [voriconazole] Other (See Comments)     " Vision loss       Medications:  Prescriptions Prior to Admission   Medication Sig    albuterol (PROVENTIL) 2.5 mg /3 mL (0.083 %) nebulizer solution Take 2.5 mg by nebulization every 4 (four) hours as needed.    alprazolam (XANAX) 0.5 MG tablet Take 0.5 tablets by mouth daily as needed.     baclofen (LIORESAL) 10 MG tablet Take 10 mg by mouth 3 (three) times daily.    ciclesonide (ALVESCO) 160 mcg/actuation HFAA Inhale 1 puff into the lungs 2 (two) times daily. Controller    dapsone 100 MG Tab Take 100 mg by mouth once daily.    dexlansoprazole (DEXILANT) 60 mg capsule Take 60 mg by mouth once daily.    ergocalciferol (VITAMIN D2) 50,000 unit Cap Take 1 capsule (50,000 Units total) by mouth every 7 days.    escitalopram oxalate (LEXAPRO) 20 MG tablet Take 20 mg by mouth once daily.    fluticasone (FLONASE) 50 mcg/actuation nasal spray USE 1 SPRAY IN EACH NOSTRIL TWICE A DAY    fluticasone-salmeterol 100-50 mcg/dose (ADVAIR) 100-50 mcg/dose diskus inhaler Inhale 1 puff into the lungs 2 (two) times daily. Controller    guaifenesin (MUCINEX) 600 mg 12 hr tablet Take 1,200 mg by mouth 2 (two) times daily.     ipratropium (ATROVENT) 0.02 % nebulizer solution     LACTOBACILLUS ACIDOPHILUS (PROBIOTIC ACIDOPHILUS ORAL) Take 1 capsule by mouth 2 (two) times daily.    MIRALAX 17 gram/dose powder Take 17 g by mouth daily as needed.    montelukast (SINGULAIR) 10 mg tablet Take 10 mg by mouth once daily.    multivit-min-FA-herbal no.245 (ALIVE WOMEN'S GUMMY VITAMINS) 200 mcg- 37.5 mg Chew Take 2 capsules by mouth once daily.    omalizumab (XOLAIR) 150 mg injection Inject 150 mg into the skin every 14 (fourteen) days.    pravastatin (PRAVACHOL) 80 MG tablet Take 80 mg by mouth once daily.    predniSONE (DELTASONE) 10 MG tablet Take 30 mg in AM and 10 mg in PM    tiotropium bromide 1.25 mcg/actuation Mist Inhale 2.5 mcg into the lungs once daily. Controller    verapamil (VERELAN) 240 MG C24P Take 1 capsule by  mouth once daily at 6am.     Antibiotics     Start     Stop Route Frequency Ordered    06/10/18 1415  vancomycin 750 mg in normal saline 250 mL IVPB (ready to mix system)      -- IV Every 24 hours (non-standard times) 06/10/18 1345    05/27/18 1015  dapsone tablet 100 mg      -- Oral Daily 05/27/18 1008        Antifungals     Start     Stop Route Frequency Ordered    06/09/18 0900  isavuconazonium sulfate Cap 372 mg      -- Oral Daily 06/08/18 1336    06/06/18 1045  miconazole nitrate 2% ointment     Question Answer Comment   Is the patient competent? No    Is the care giver competent? Yes        -- Top 2 times daily 06/06/18 1033        Antivirals     None           There is no immunization history for the selected administration types on file for this patient.    Family History     Problem Relation (Age of Onset)    Atrial fibrillation Mother    Diabetes Mother    Heart disease Father    Hyperlipidemia Mother, Father    Hypertension Mother, Father, Sister        Social History     Social History    Marital status:      Spouse name: N/A    Number of children: 3    Years of education: N/A     Social History Main Topics    Smoking status: Never Smoker    Smokeless tobacco: Never Used    Alcohol use No      Comment: social occ    Drug use: No    Sexual activity: Yes     Partners: Male     Other Topics Concern    None     Social History Narrative    None     Review of Systems   Unable to perform ROS: Severe respiratory distress     Objective:     Vital Signs (Most Recent):  Temp: 98.3 °F (36.8 °C) (06/10/18 1500)  Pulse: (!) 123 (06/10/18 1800)  Resp: (!) 23 (06/10/18 1800)  BP: 121/76 (06/10/18 1800)  SpO2: 96 % (06/10/18 1800) Vital Signs (24h Range):  Temp:  [97.9 °F (36.6 °C)-99.1 °F (37.3 °C)] 98.3 °F (36.8 °C)  Pulse:  [110-132] 123  Resp:  [12-48] 23  SpO2:  [83 %-100 %] 96 %  BP: ()/(57-99) 121/76     Weight: 81.4 kg (179 lb 7.3 oz)  Body mass index is 32.82 kg/m².    Estimated Creatinine  Clearance: 78.5 mL/min (based on SCr of 0.8 mg/dL).    Physical Exam   Constitutional: She appears well-developed and well-nourished.   HENT:   Head: Normocephalic.   Neck: Normal range of motion.   Cardiovascular: Normal rate and regular rhythm.    Pulmonary/Chest: She is in respiratory distress. She has wheezes. She has rales.   Abdominal: She exhibits distension.   Musculoskeletal: Normal range of motion.   Neurological: She is alert.   Skin: Skin is warm.   IV sites OK        Significant Labs: All pertinent labs within the past 24 hours have been reviewed.    Significant Imaging: I have reviewed all pertinent imaging results/findings within the past 24 hours.

## 2018-06-11 NOTE — PROGRESS NOTES
Called CC team regarding low UO.  Patient only put out 5cc into mays catheter this past hour.  No new orders @ this time.

## 2018-06-11 NOTE — SUBJECTIVE & OBJECTIVE
"Past Medical History:   Diagnosis Date    ABPA (allergic bronchopulmonary aspergillosis)     Allergy     Anxiety     Arachnoid cyst     monitoring    Asthma     COPD (chronic obstructive pulmonary disease)     Hemoptysis     Hypertension     Tachycardia     Thalamic disease     thalamia minor       Past Surgical History:   Procedure Laterality Date    BREAST SURGERY      breast bx     BR[place]COPY  10/2016    [place]     SECTION      CHOLECYSTECTOMY      COLONOSCOPY N/A 2017    Procedure: COLONOSCOPY;  Surgeon: Horacio Pelaez MD;  Location: [facility];  Service: General;  Laterality: N/A;    DENTAL SURGERY      GALLBLADDER SURGERY      HERNIA REPAIR      INTRAUTERINE DEVICE INSERTION      SINUS SURGERY         Review of patient's allergies indicates:   Allergen Reactions    Neosporin (neomycin-polymyx) Rash     "I get a skin rash"    Sulfa (sulfonamide antibiotics) Anaphylaxis    Bactrim [sulfamethoxazole-trimethoprim]     Venlafaxine analogues     Pollen extracts Other (See Comments)     "allergic rhinitis mess"    Vfend [voriconazole] Other (See Comments)     Vision loss       Medications:  Prescriptions Prior to Admission   Medication Sig    albuterol (PROVENTIL) 2.5 mg /3 mL (0.083 %) nebulizer solution Take 2.5 mg by nebulization every 4 (four) hours as needed.    alprazolam (XANAX) 0.5 MG tablet Take 0.5 tablets by mouth daily as needed.     baclofen (LIORESAL) 10 MG tablet Take 10 mg by mouth 3 (three) times daily.    ciclesonide (ALVESCO) 160 mcg/actuation HFAA Inhale 1 puff into the lungs 2 (two) times daily. Controller    dapsone 100 MG Tab Take 100 mg by mouth once daily.    dexlansoprazole (DEXILANT) 60 mg capsule Take 60 mg by mouth once daily.    ergocalciferol (VITAMIN D2) 50,000 unit Cap Take 1 capsule (50,000 Units total) by mouth every 7 days.    escitalopram oxalate (LEXAPRO) 20 MG tablet Take 20 mg by mouth once daily.    fluticasone (FLONASE) 50 " mcg/actuation nasal spray USE 1 SPRAY IN EACH NOSTRIL TWICE A DAY    fluticasone-salmeterol 100-50 mcg/dose (ADVAIR) 100-50 mcg/dose diskus inhaler Inhale 1 puff into the lungs 2 (two) times daily. Controller    guaifenesin (MUCINEX) 600 mg 12 hr tablet Take 1,200 mg by mouth 2 (two) times daily.     ipratropium (ATROVENT) 0.02 % nebulizer solution     LACTOBACILLUS ACIDOPHILUS (PROBIOTIC ACIDOPHILUS ORAL) Take 1 capsule by mouth 2 (two) times daily.    MIRALAX 17 gram/dose powder Take 17 g by mouth daily as needed.    montelukast (SINGULAIR) 10 mg tablet Take 10 mg by mouth once daily.    multivit-min-FA-herbal no.245 (ALIVE WOMEN'S GUMMY VITAMINS) 200 mcg- 37.5 mg Chew Take 2 capsules by mouth once daily.    omalizumab (XOLAIR) 150 mg injection Inject 150 mg into the skin every 14 (fourteen) days.    pravastatin (PRAVACHOL) 80 MG tablet Take 80 mg by mouth once daily.    predniSONE (DELTASONE) 10 MG tablet Take 30 mg in AM and 10 mg in PM    tiotropium bromide 1.25 mcg/actuation Mist Inhale 2.5 mcg into the lungs once daily. Controller    verapamil (VERELAN) 240 MG C24P Take 1 capsule by mouth once daily at 6am.     Antibiotics     Start     Stop Route Frequency Ordered    06/10/18 1415  vancomycin 750 mg in normal saline 250 mL IVPB (ready to mix system)      -- IV Every 24 hours (non-standard times) 06/10/18 1345    05/27/18 1015  dapsone tablet 100 mg      -- Oral Daily 05/27/18 1008        Antifungals     Start     Stop Route Frequency Ordered    06/09/18 0900  isavuconazonium sulfate Cap 372 mg      -- Oral Daily 06/08/18 1336    06/06/18 1045  miconazole nitrate 2% ointment     Question Answer Comment   Is the patient competent? No    Is the care giver competent? Yes        -- Top 2 times daily 06/06/18 1033        Antivirals     None           There is no immunization history for the selected administration types on file for this patient.    Family History     Problem Relation (Age of Onset)     Atrial fibrillation Mother    Diabetes Mother    Heart disease Father    Hyperlipidemia Mother, Father    Hypertension Mother, Father, Sister        Social History     Social History    Marital status:      Spouse name: N/A    Number of children: 3    Years of education: N/A     Social History Main Topics    Smoking status: Never Smoker    Smokeless tobacco: Never Used    Alcohol use No      Comment: social occ    Drug use: No    Sexual activity: Yes     Partners: Male     Other Topics Concern    None     Social History Narrative    None     Review of Systems   Unable to perform ROS: Severe respiratory distress     Objective:     Vital Signs (Most Recent):  Temp: 98.3 °F (36.8 °C) (06/10/18 1500)  Pulse: (!) 123 (06/10/18 1800)  Resp: (!) 23 (06/10/18 1800)  BP: 121/76 (06/10/18 1800)  SpO2: 96 % (06/10/18 1800) Vital Signs (24h Range):  Temp:  [97.9 °F (36.6 °C)-99.1 °F (37.3 °C)] 98.3 °F (36.8 °C)  Pulse:  [110-132] 123  Resp:  [12-48] 23  SpO2:  [83 %-100 %] 96 %  BP: ()/(57-99) 121/76     Weight: 81.4 kg (179 lb 7.3 oz)  Body mass index is 32.82 kg/m².    Estimated Creatinine Clearance: 78.5 mL/min (based on SCr of 0.8 mg/dL).    Physical Exam   Constitutional: She appears well-developed and well-nourished.   HENT:   Head: Normocephalic.   Neck: Normal range of motion.   Cardiovascular: Normal rate and regular rhythm.    Pulmonary/Chest: She is in respiratory distress. She has wheezes. She has rales.   Abdominal: She exhibits distension.   Musculoskeletal: Normal range of motion.   Neurological: She is alert.   Skin: Skin is warm.   IV sites OK        Significant Labs: All pertinent labs within the past 24 hours have been reviewed.    Significant Imaging: I have reviewed all pertinent imaging results/findings within the past 24 hours.

## 2018-06-11 NOTE — ASSESSMENT & PLAN NOTE
-- likely due to drug adverse effect suspected from voriconazole. Switched to isavuconazonium in setting of conjunctival swelling now with improving LFT's.   -- LFT's w/ mild increase in the past couple of days - could be the combination of episodic hypotension yesterday prior to esmolol discontinuation vs. Chronic use of isaniconazonium for aspergillosis   -- Continue current treatment per ID recommendations

## 2018-06-11 NOTE — PLAN OF CARE
Problem: Patient Care Overview  Goal: Plan of Care Review  Outcome: Ongoing (interventions implemented as appropriate)  Patient elected to be intubated shortly after start of shift.  Intubated by Dr. Moses with Anesthesia.  She remains neuro intact.  Follows commands/nods appropriately.  Sleeping in between care.  Olivares drained 222cc of urine overnight - MDs aware.  FlexiSeal in place - put out 300cc of pasty brown stool.  Multiple skin issues - skin very fragile.  Incontinence associated dermatitis to groin/buttocks/sacrum causes Mrs. Coulter pain.  RUE PICC line intact.  Right radial A line intact.  MAP goal 65 - difficult to achieve this AM - 3rd pressor added just before change of shift.  Generalized edema.      SCDs for DVT prophylaxis.      Precedex/Levo/Christain/Vaso/ABX/electrolye replacement/IVF boluses per eMAR.       @ bedside all shift.      Problem: Pressure Ulcer Risk (Jasvir Scale) (Adult,Obstetrics,Pediatric)  Intervention: Prevent/Manage Excess Moisture  Triad cream applied to buttocks twice this shift.  Gentle marry care as marry care very painful for patient.  Asked Ms. Burnett about pre-medicating with morphine but patient declined.  Heel boots in place.  InterDry to abdominal fold.      Minimal movement last night due to fragile state.        Problem: Nutrition, Imbalanced: Inadequate Oral Intake (Adult)  Intervention: Promote/Optimize Nutrition  Patient currently NPO due to mechanical ventilation.

## 2018-06-11 NOTE — PROGRESS NOTES
Patient's HR sustaining in the 140/150s.  Called CC team for orders.  Received order for precedex and fluid bolus.  Will continue to monitor patient very closely.

## 2018-06-12 NOTE — NURSING
Dr Rodriguez @ BS - spoke with spouse and family - pressures turned off and Morphine titrated for discomfort.

## 2018-06-12 NOTE — NURSING
Asystole. Pulseless. No heart tones. CCS called @ 09804 to pronounce. Family @ BS - aware pt has passed away.

## 2018-06-12 NOTE — PROGRESS NOTES
Withdrew care on patient. MD at bedside, time of death pronounced. Vent turned off and pt extubated.

## 2018-06-12 NOTE — DISCHARGE SUMMARY
[facility]  Critical Care Medicine  Discharge Summary      Patient Name: Kamla Coulter  MRN: [id number]  Admission Date: 5/25/2018  Hospital Length of Stay: 17 days  Discharge Date and Time:  06/11/2018 10:16 PM  Attending Physician: Harshil Fuchs*   Discharging Provider: Ana Villalobos MD  Primary Care Provider: Molina Alexandre MD  Reason for Admission: ABPA    HPI:     This is a 57 year old female with hx of severe persistent asthma on Xolair, prolonged immunosuppression on steroids, ABPA,bronchiectasis, HTN, DVT, and GERD who is transferred from [facility] for higher level of care. She initially presented to the OSH on May second for progressive shortness of breath, wheezing and orthopnea over a period of 2-3 days associated with low-grade fever. She was admitted to the hospital medicine floor and started on IV solumedrol 40mg bid and was placed on BiPAP. She was weaned off to NC on the second day of admission. Blood cultures from admission grew MRSA bacteremia and patient was started on vancomycin. Urine and sputum cultures both grew MRSA. The patient had persistent MRSA bacteremia despite being on vancomycin and a SHEILA was done on 5/8/2018 that was negative for endocarditis and revealed normal EF%. A bone scan on 5/10/2018 was similarly negative for an infection nidus. On 5/10/2018 the patient developed an acute hypoxic respiratory failure that did not improve promptly on BiPAP with an FiO2 of 100% and the patient was moved to the ICU and started empirically on therapeutic Lovenox. A CTA done on the same day revealed no PE but was notable for worsening bilateral pleural effusion and a non-specific infiltrate/collection around the mid-esophagus. The collection was thought to be a loculated pleural effusion. IV solumedrol was increased to 80mg twice daily and Lovenox was decreased to ppx dosing. By 5/12/2018 the patient's respiratory failure had  improved and patient was weaned off of BiPAP to nasal cannula. Sputum cultures collected on 5/13/2018 showed budding yeast and the patient was started on voriconazole. On 5/15/2018 the patient had worsening hypoxia and increased work of breathing and a CXR showed HAP and Ceftaroline was added to vancomycin. On 5/16/2018, repeat blood cultures showed persistent MRSA bacteremia and an Indium scan performed on 5/18/2018 revealed significant uptake by a collection located adjacent to the mid-esophagus. The patient was electively intubated on 5/22/2018 and an EGD revealed a whitish plaque at the proximal esophagus that was biopsied and resulted negative for malignant changes. Similarly, on the same day, an EBUS was performed and showed fluid collection posterior to the distal trachea that was better assessed through 5 FNA passes. Cultures from the EBUS grew MRSA + Alcaligenes/Achromobcater Xylosoxidens. However, FNA was negative for any malignancies. The patient was extubated the same day. Two days later (5/24/2018), the patient developed an acute decompensated respiratory failure with oxygen saturations dropping to the 60's and the patient was emergently intubated. CXR showed left lower lobe atelectasis. An emergent bronchoscopy revealed a mucus plug in the left mainstem bronchus. The mucus plug was brown, thick, and difficult to suction raising suspicion for Aspergillus infection. Following mucus plug disimpaction, the patient's respiratory status recovered immediately and the patient was extubated the next morning (5/25/2018). Prior to that a repeat CT chest showed persistent collection around the mid-esophagus. At this point, it was decided that the patient was better served at a higher level of care facility and the patient was transferred to [facility]. The patient arrived on BiPAP 10/5 and a 45% FiO2 with good oxygen saturation.       * No surgery found *    Indwelling Lines/Drains at Time of Discharge:    Lines/Drains/Airways     Peripherally Inserted Central Catheter Line                 PICC Double Lumen 05/24/18 right brachial 18 days          Drain                 Urethral Catheter 06/07/18 2032 4 days         Rectal Tube 06/09/18 1200 2 days         NG/OG Tube 06/10/18 2049 orogastric 8 Fr. Center mouth 1 day          Airway                 Airway - Non-Surgical 06/10/18 2120 Endotracheal Tube 1 day          Arterial Line                 Arterial Line 06/10/18 2300 Right Radial less than 1 day              Hospital Course:   Admitted as a transfer from [facility] on 5/25/2018 for higher level of care. Patient required BiPAP on admission but was able to wean to HFNC on second day of hospitalization. ID was consulted and had made changes to antimicrobials with continuing vancomycin, switching voriconazole to isavuconazonium and zosyn to meropenem. Blood cultures showed NGTD.  05/28/2018 Worsening respiratory status requiring intubation morning of 5/28/2018 for oxygen saturation in the lower 70's with emergent bronchoscopy done following intubation revealing left system mucus plugging. Following suctioning of mucus plugging oxygen saturation had finally improved. Repeat CT chest did not show mediastinal collection.    05/29/2018 drop in H/H requiring two units of pRBC. Heparin held. Plan for extubation today. Continuing diuresis and management of resp secretions. CT abdomen revealed no intraabdominal source of bleeding. Patient had repeat bronchoscopy and extubated to [facility].    05/30/2018 resuming heparin gtt and weaning oxygen requirement prn. Delirious overnight.       06/01/2018 NG removed by patient overnight. Patient with SLP repeat eval this am. Continue weaning off oxygen.     06/02/2018 CTA negative for PE and heparin held. Speech eval today.     06/03/2018 continue supportive care and weaning oxygen requirement and steroids.     06/04/2018 No acute events overnight.  Patient  C/o increased  anxiety overnight and still on CPAP w/ PEEP 5 & FiO2 40%, intubated for airway protection     Bronch negative for mucous plusg     Extubated    Lasix gtt for diuresis. Esmolol d/c'ed due to hypotension; Christian drip started transiently and off at 20:26   SBP improved. LFTs trending up and HR 120s   Increasing verapamil. Pt w/ more LE pain. US LE. BiPAP at night and CF during the day.  06/10 Patient with several episodes of desaturation overnight 2/2 mucus plugging. After a lengthy discussion w/ patient and family, patient chose to be DNR at this time.    Patient w/ worsening resp status overnight; DNR revoked and patient intubated. On three pressors w/ tachycardia. Family elected to make patient partial code w/o chest compressions. In the evening, the patient's family decided to discontinue pressor support, and patient  soon afterwards.     Consults         Status Ordering Provider     Inpatient consult to Gastroenterology  Once     Provider:  (Not yet assigned)    Completed KARRI VILLARREAL     Inpatient consult to Infectious Diseases  Once     Provider:  (Not yet assigned)    Completed EDDIE GANDARA     Inpatient consult to Infectious Diseases  Once     Provider:  (Not yet assigned)    Completed BERRY MARIE     Inpatient consult to PICC team ([facility])  Once     Provider:  (Not yet assigned)    Completed ALFIE BRISCOE     Inpatient consult to Psychiatry  Once     Provider:  (Not yet assigned)    Completed WEST ROTH     Inpatient consult to Registered Dietitian/Nutritionist  Once     Provider:  (Not yet assigned)    Completed EDDIE GANDARA     Inpatient consult to Registered Dietitian/Nutritionist  Once     Provider:  (Not yet assigned)    Completed JOSE DANIEL RILEY            Pending Diagnostic Studies:     Procedure Component Value Units Date/Time    Potassium [[id number]] Collected:  18 1139    Order Status:  Sent Lab Status:  In process Updated:   05/27/18 1139    Specimen:  Blood from Blood     VANCOMYCIN, TROUGH before 3rd dose [[id number]] Collected:  05/30/18 1110    Order Status:  Sent Lab Status:  In process Updated:  05/30/18 1111    Specimen:  Blood from Blood         Final Active Diagnoses:    Diagnosis Date Noted POA    PRINCIPAL PROBLEM:  Acute hypoxemic respiratory failure [J96.01] 05/26/2018 Yes    Goals of care, counseling/discussion [Z71.89] 06/10/2018 Not Applicable    Alteration in skin integrity [R23.9] 06/06/2018 Yes    Acute blood loss anemia [D62] 06/05/2018 No    Metabolic alkalosis [E87.3] 05/30/2018 No    Abdominal wall fluid collections [R18.8] 05/30/2018 No    Generalized anxiety disorder [F41.1] 05/30/2018 Yes    Delirium [R41.0] 05/30/2018 No    Atelectasis [J98.11] 05/29/2018 Yes    Abnormal LFTs [R94.5] 05/28/2018 Yes    Conjunctival edema of both eyes [H11.423] 05/28/2018 No    MRSA bacteremia [R78.81] 05/26/2018 Yes    Hereditary spherocytosis [D58.0] 04/18/2018 Yes    Essential hypertension [I10] 12/19/2017 Yes    Impaired glucose tolerance [R73.02] 12/19/2017 Yes    Mixed hyperlipidemia [E78.2] 12/19/2017 Yes    Obesity [E66.9] 09/13/2017 Yes    GERD (gastroesophageal reflux disease) [K21.9] 09/12/2017 Yes    ABPA (allergic bronchopulmonary aspergillosis) [B44.81] 08/23/2017 Yes    Severe persistent asthma [J45.50] 09/30/2016 Yes      Problems Resolved During this Admission:    Diagnosis Date Noted Date Resolved POA    Thrombocytopenia [D69.6] 05/30/2018 06/04/2018 No    Hypotension [I95.9] 05/29/2018 05/31/2018 No    Achromobacter pneumonia [J15.6] 05/29/2018 06/04/2018 Yes    Mucus plugging of bronchi [J98.09] 05/29/2018 05/31/2018 Yes    Mediastinal collection [J85.3] 05/26/2018 06/04/2018 Yes    Fungemia [B49] 05/26/2018 05/27/2018 Yes    Respiratory failure [J96.90] 05/25/2018 06/07/2018 Yes    History of DVT of lower extremity [Z86.718] 04/18/2018 06/04/2018 Not Applicable     No new  Assessment & Plan notes have been filed under this hospital service since the last note was generated.  Service: Critical Care Medicine    Discharged Condition:     Disposition:       Patient Instructions:   No discharge procedures on file.  Medications:  None (patient  at medical facility)     Ana Villalobos MD  Critical Care Medicine  [facility]

## 2018-06-12 NOTE — PT/OT/SLP DISCHARGE
Physical Therapy Discharge Summary    Name: Kamla Coulter  MRN: [id number]   Principal Problem: Acute hypoxemic respiratory failure     Patient Discharged from acute Physical Therapy on 2018.     Assessment:     Patient is no longer making progress.    Objective:     GOALS:    Physical Therapy Goals        Problem: Physical Therapy Goal    Goal Priority Disciplines Outcome Goal Variances Interventions   Physical Therapy Goal     PT/OT, PT Ongoing (interventions implemented as appropriate)     Description:  Goals to be met by: 2018    Patient will increase functional independence with mobility by performin. Supine to sit with Moderate Assistance - not met  2. Sit to supine with Moderate Assistance - not met  3. Sit to stand transfer with Moderate Assistance using Rolling Walker - not met  4. Bed to chair transfer with Moderate Assistance using Rolling Walker - not met  5. Gait  x 50 feet with Moderate Assistance using Rolling Walker. - not met  6. Sitting at edge of bed x5 minutes with Moderate Assistance - not met  7. Lower extremity exercise program x10 reps per handout, with independence - not emt                      Reasons for Discontinuation of Therapy Services  Pt     Plan:     Altagracia Colby PT, DPT  2018  [phone]

## 2018-06-12 NOTE — SIGNIFICANT EVENT
Death Note     Called to bedside by patient's nurse. Nursing supervisor notified. Family at bedside.  has been called and is also at bedside.  Patient is not responding to verbal or tactile stimuli. Patient does not have a pupillary or corneal reflex. Her pupils are fixed and dilated. No heart or breath sounds on auscultation. No respirations. No palpable pulses.     Time of death: 2139      Cause of Death: Respiratory Failure due to ABPA      Ana Villalobos MD.   Internal Medicine PGY-1

## 2018-06-12 NOTE — PLAN OF CARE
18 1001   Final Note   Assessment Type Final Discharge Note   Discharge Disposition    Hospital Follow Up  Appt(s) scheduled? No   Discharge plans and expectations educations in teach back method with documentation complete? No   Right Care Referral Info   Post Acute Recommendation Other  ( in medical facility - woc)   Xuan Waldrop RN, BSN, CCM  Case Management  [facility]  Ext. [phone]

## 2018-06-13 LAB
BACTERIA BLD CULT: NORMAL

## 2018-06-13 NOTE — PT/OT/SLP DISCHARGE
Occupational Therapy Discharge Summary    Kamla Coulter  MRN: [id number]   Principal Problem: Acute hypoxemic respiratory failure      Patient Discharged from acute Occupational Therapy on 2018  .  Please refer to prior OT note dated 18 for functional status.    Assessment:      Pt has     Objective:     GOALS:    Occupational Therapy Goals        Problem: Occupational Therapy Goal    Goal Priority Disciplines Outcome Interventions   Occupational Therapy Goal     OT, PT/OT Ongoing (interventions implemented as appropriate)    Description:  Goals to be met by: 2018    Pt will complete supine>sit with mod A in prep for seated grooming task.  Pt will sit at EOB x10 minutes with min A for balance to wipe face with washcloth.  Pt will complete scooting along EOB with min A in prep for scoot pivot t/f to C.  Pt will complete sit>stand with mod A in prep for toilet transfer.  Pt will complete UB Dressing with min A                     Reasons for Discontinuation of Therapy Services  goals not achieved; pt has .       Plan:     Patient Discharged to: pt has     ANNABELLE Burns  2018

## 2018-06-18 NOTE — PHYSICIAN QUERY
PT Name: Kamla Coulter  MR #: [id number]     Physician Query Form - Documentation Clarification      Rudolph Boland RN CDS    Contact Information: [phone]    This form is a permanent document in the medical record.     Query Date: June 18, 2018    By submitting this query, we are merely seeking further clarification of documentation. Please utilize your independent clinical judgment when addressing the question(s) below.    The Medical record reflects the following:    Supporting Clinical Findings Location in Medical Record   0801 patient became hypotensive 57/36 (42); 2mg Christian bump given per MD orders    Blood cultures from admission grew MRSA bacteremia and   Mediastinal abscess - MRSA     Hypotension -- hold verapamil 240mg od for current hypotension following intubation and being on sedation and high PEEP.    Hypotension -- likely due to intubation and being on sedation + hypovolemia due to blood loss.   -- was on levophed for 12 hours and weaned off 5/29/2018.      Esmolol d/c'ed due to hypotension; Christian drip started transiently and off at 20:26     Acute hypoxic respiratory failure- worsening status overnight and intubated. Titrate Ventilator as tolerated. Morphine for symptom control   2)ABPA- Continue Cresemba. Steroids.   3)Volume overload- diuresis as tolerated. -- unable to at this time due to hypotension   4)MRSA bacteremia- Vancomycin   5)Anxiety/delirium- precedex and ativan prn   6)Sinus Tachycardia     Family has opted to move towards and comfort measures and withdrawal of life support       Lactic Acid   1.2,   4.1,     WBC 10.06, 10.64, 12.57, 13.12, 13.12, 11.33  WBC 3.35,2.94, 2.48, 3.63, 3.13, 3.73, 2.78, 2.26, 2.30, 1.86, 1.40, 1.29, 1.28, 1.46, 1.58, 0.99    BP 88/50, 57/32, 58/32, 57/36, 88/50, 89/52, 68/50, 70/42, 67/42   , 107, 114, 109, 111, 108     RR 33, 21, 24, 34, 27, 28      5/28 ED Procedure       5/26 H&P       5/28 Critical Care Medicine PN      5/30 Critical Care  Medicine PN            6/9 Critical Care Medicine PN      6/11 Critical Care Medicine PN                    5/25, 6/11 Lab    5/25-5/29 Lab  6/3-6/11 Lab      5/25-6/11 VS Flowsheet      norepinephrine 4 mg in dextrose 5% 250 mL infusion (premix) (titrating) :     Vancomycin 1250 mg IVPB     Phenylephrine HCl 100 mcg/ml syringe     Meropenem 2 gm IVPB     isavuconazonium sulfate 372 IVPB     Heparin 1064.2 units/hr IV continuous          MAR 5/29       MAR 5/28-5/29     MAR 5/28     MAR 5/28-5/29                                                                               Doctor, Please specify diagnosis or diagnoses associated with above clinical findings.    Provider Use Only      [ X ] Septic Shock    [  ] Hypovolemic Shock    [  ] Other Shock conditions (Specify)________________________                                                                                                               [  ] Clinically undetermined

## 2018-06-18 NOTE — PHYSICIAN QUERY
PT Name: Kamla Coulter  MR #: [id number]    Physician Query Form - Cause and Effect Relationship Clarification        Rudolph Boland RN CDS    Contact Information: [phone]      This form is a permanent document in the medical record.     Query Date: June 18, 2018    By submitting this query, we are merely seeking further clarification of documentation. Please utilize your independent clinical judgment when addressing the question(s) below.    The Medical record contains the following:  Supporting Clinical Findings   Location in record    [ X ] Sepsis              Blood cultures from admission grew MRSA bacteremia and   Mediastinal abscess - MRSA         Course complicated with MRSA bacteremia/pneumonia and mediastinal collection positive for Achromobacter sp and MRSA.                                                                       5/29 Other Documentation       5/26 H&P        5/26 Infectious Diseases Consult          Klebsiella pneumoniae                                                                                                                                                                                          6/11 Blood Culture         Provider, please clarify if there is any correlation between ___Sepsis___ and __Klebsiella pneumoniae ______.           Are the conditions:     [X  ] Due to or associated with each other     [  ] Unrelated to each other     [  ] Other (Please Specify): _________________________     [  ] Clinically Undetermined

## 2018-06-26 LAB — FUNGUS SPEC CULT: NORMAL

## 2018-07-05 LAB
FUNGUS SPEC CULT: NORMAL
FUNGUS SPEC CULT: NORMAL

## 2018-07-30 LAB
ACID FAST MOD KINY STN SPEC: NORMAL
MYCOBACTERIUM SPEC QL CULT: NORMAL

## 2018-08-07 LAB
ACID FAST MOD KINY STN SPEC: NORMAL
MYCOBACTERIUM SPEC QL CULT: NORMAL

## 2020-02-07 NOTE — ASSESSMENT & PLAN NOTE
-- Normotensive here.   Letter sent per Dr. Sánchez regarding normal/stable lab results. Normal letter sent per protocol/verified with supervisor. Encouraged the patient to call the office with any questions or concerns regarding these test result and to follow up as planned.    URMILA Pitt RN
